# Patient Record
Sex: FEMALE | Race: BLACK OR AFRICAN AMERICAN | Employment: UNEMPLOYED | URBAN - METROPOLITAN AREA
[De-identification: names, ages, dates, MRNs, and addresses within clinical notes are randomized per-mention and may not be internally consistent; named-entity substitution may affect disease eponyms.]

---

## 2017-01-03 ENCOUNTER — TELEPHONE (OUTPATIENT)
Dept: SURGERY | Age: 37
End: 2017-01-03

## 2017-01-03 NOTE — TELEPHONE ENCOUNTER
I called the patient and i left her a voice mail to call me back in regards to her post op phone call.

## 2017-01-03 NOTE — TELEPHONE ENCOUNTER
----- Message from Nacho Robledo LPN sent at [id number]  8:18 AM EST -----  Regardin day post op call  Lap sleeve gastrectomy discharged on 16

## 2017-01-04 ENCOUNTER — OFFICE VISIT (OUTPATIENT)
Dept: SURGERY | Age: 37
End: 2017-01-04

## 2017-01-04 ENCOUNTER — HOSPITAL ENCOUNTER (OUTPATIENT)
Age: 37
Discharge: HOME | End: 2017-01-04
Attending: SURGERY | Admitting: SURGERY
Payer: MEDICAID

## 2017-01-04 VITALS
HEART RATE: 93 BPM | BODY MASS INDEX: 48.82 KG/M2 | DIASTOLIC BLOOD PRESSURE: 90 MMHG | WEIGHT: 293 LBS | SYSTOLIC BLOOD PRESSURE: 142 MMHG | HEIGHT: 65 IN | TEMPERATURE: 98.4 F | OXYGEN SATURATION: 96 % | RESPIRATION RATE: 20 BRPM

## 2017-01-04 VITALS
OXYGEN SATURATION: 95 % | HEART RATE: 76 BPM | RESPIRATION RATE: 17 BRPM | DIASTOLIC BLOOD PRESSURE: 94 MMHG | SYSTOLIC BLOOD PRESSURE: 177 MMHG | TEMPERATURE: 98.7 F

## 2017-01-04 DIAGNOSIS — E86.0 DEHYDRATION: ICD-10-CM

## 2017-01-04 DIAGNOSIS — Z09 SURGICAL FOLLOW-UP CARE: Primary | ICD-10-CM

## 2017-01-04 DIAGNOSIS — Z98.84 S/P LAPAROSCOPIC SLEEVE GASTRECTOMY: ICD-10-CM

## 2017-01-04 PROCEDURE — 96360 HYDRATION IV INFUSION INIT: CPT

## 2017-01-04 PROCEDURE — 74011250637 HC RX REV CODE- 250/637: Performed by: NURSE PRACTITIONER

## 2017-01-04 PROCEDURE — 96361 HYDRATE IV INFUSION ADD-ON: CPT

## 2017-01-04 PROCEDURE — 74011000250 HC RX REV CODE- 250: Performed by: NURSE PRACTITIONER

## 2017-01-04 PROCEDURE — C9113 INJ PANTOPRAZOLE SODIUM, VIA: HCPCS | Performed by: NURSE PRACTITIONER

## 2017-01-04 PROCEDURE — 99211 OFF/OP EST MAY X REQ PHY/QHP: CPT

## 2017-01-04 PROCEDURE — 96374 THER/PROPH/DIAG INJ IV PUSH: CPT

## 2017-01-04 PROCEDURE — 74011250636 HC RX REV CODE- 250/636: Performed by: NURSE PRACTITIONER

## 2017-01-04 PROCEDURE — 96375 TX/PRO/DX INJ NEW DRUG ADDON: CPT

## 2017-01-04 RX ORDER — METOCLOPRAMIDE HYDROCHLORIDE 5 MG/ML
10 INJECTION INTRAMUSCULAR; INTRAVENOUS EVERY 6 HOURS
Status: DISCONTINUED | OUTPATIENT
Start: 2017-01-04 | End: 2017-01-04 | Stop reason: HOSPADM

## 2017-01-04 RX ORDER — SODIUM CHLORIDE, SODIUM LACTATE, POTASSIUM CHLORIDE, CALCIUM CHLORIDE 600; 310; 30; 20 MG/100ML; MG/100ML; MG/100ML; MG/100ML
200 INJECTION, SOLUTION INTRAVENOUS CONTINUOUS
Status: DISCONTINUED | OUTPATIENT
Start: 2017-01-04 | End: 2017-01-04 | Stop reason: HOSPADM

## 2017-01-04 RX ORDER — HYOSCYAMINE SULFATE 0.12 MG/1
0.12 TABLET SUBLINGUAL
Status: DISCONTINUED | OUTPATIENT
Start: 2017-01-04 | End: 2017-01-04 | Stop reason: HOSPADM

## 2017-01-04 RX ADMIN — SODIUM CHLORIDE 40 MG: 9 INJECTION INTRAMUSCULAR; INTRAVENOUS; SUBCUTANEOUS at 15:04

## 2017-01-04 RX ADMIN — METOCLOPRAMIDE 10 MG: 5 INJECTION, SOLUTION INTRAMUSCULAR; INTRAVENOUS at 15:04

## 2017-01-04 RX ADMIN — SODIUM CHLORIDE, SODIUM LACTATE, POTASSIUM CHLORIDE, AND CALCIUM CHLORIDE 200 ML/HR: 600; 310; 30; 20 INJECTION, SOLUTION INTRAVENOUS at 12:30

## 2017-01-04 RX ADMIN — HYOSCYAMINE SULFATE 0.12 MG: 0.12 TABLET ORAL at 15:04

## 2017-01-04 NOTE — PROGRESS NOTES
1225 Arrived to room 433 A in a wheelchair. Very anxious about getting another IV. Stated that she was unable to tolerate diet at home. Sleepy. Tearful.

## 2017-01-04 NOTE — PATIENT INSTRUCTIONS
Start taking multivitamin with iron and Vitamin B12. Take Prilosec daily     Continue Lovenox injection     Dizziness: Care Instructions  Your Care Instructions  Dizziness is the feeling of unsteadiness or fuzziness in your head. It is different than having vertigo, which is a feeling that the room is spinning or that you are moving or falling. It is also different from lightheadedness, which is the feeling that you are about to faint. It can be hard to know what causes dizziness. Some people feel dizzy when they have migraine headaches. Sometimes bouts of flu can make you feel dizzy. Some medical conditions, such as heart problems or high blood pressure, can make you feel dizzy. Many medicines can cause dizziness, including medicines for high blood pressure, pain, or anxiety. If a medicine causes your symptoms, your doctor may recommend that you stop or change the medicine. If it is a problem with your heart, you may need medicine to help your heart work better. If there is no clear reason for your symptoms, your doctor may suggest watching and waiting for a while to see if the dizziness goes away on its own. Follow-up care is a key part of your treatment and safety. Be sure to make and go to all appointments, and call your doctor if you are having problems. It's also a good idea to know your test results and keep a list of the medicines you take. How can you care for yourself at home? · If your doctor recommends or prescribes medicine, take it exactly as directed. Call your doctor if you think you are having a problem with your medicine. · Do not drive while you feel dizzy. · Try to prevent falls. Steps you can take include:  ¨ Using nonskid mats, adding grab bars near the tub, and using night-lights. ¨ Clearing your home so that walkways are free of anything you might trip on. ¨ Letting family and friends know that you have been feeling dizzy. This will help them know how to help you.   When should you call for help? Call 911 anytime you think you may need emergency care. For example, call if:  · You passed out (lost consciousness). · You have dizziness along with symptoms of a heart attack. These may include:  ¨ Chest pain or pressure, or a strange feeling in the chest.  ¨ Sweating. ¨ Shortness of breath. ¨ Nausea or vomiting. ¨ Pain, pressure, or a strange feeling in the back, neck, jaw, or upper belly or in one or both shoulders or arms. ¨ Lightheadedness or sudden weakness. ¨ A fast or irregular heartbeat. · You have symptoms of a stroke. These may include:  ¨ Sudden numbness, tingling, weakness, or loss of movement in your face, arm, or leg, especially on only one side of your body. ¨ Sudden vision changes. ¨ Sudden trouble speaking. ¨ Sudden confusion or trouble understanding simple statements. ¨ Sudden problems with walking or balance. ¨ A sudden, severe headache that is different from past headaches. Call your doctor now or seek immediate medical care if:  · You feel dizzy and have a fever, headache, or ringing in your ears. · You have new or increased nausea and vomiting. · Your dizziness does not go away or comes back. Watch closely for changes in your health, and be sure to contact your doctor if:  · You do not get better as expected. Where can you learn more? Go to http://juan diego-jean pierre.info/. Enter M099 in the search box to learn more about \"Dizziness: Care Instructions. \"  Current as of: May 27, 2016  Content Version: 11.1  © 5205-1383 Healthwise, Incorporated. Care instructions adapted under license by [employer] (which disclaims liability or warranty for this information). If you have questions about a medical condition or this instruction, always ask your healthcare professional. [address] any warranty or liability for your use of this information.

## 2017-01-04 NOTE — H&P
Jane Mendoza is a 31 y.o. female 1 week status post lap sleeve gastrectomy, down 9.5 pounds. Weight today is 370.5 pounds. Patient comes in office with complaint of dizziness and weakness since discharge from surgery. Patient stated that she has felt really bad. Also complaint of abdominal spasms and pain, mostly spasms. Admits to not taking any other medications that she was given pre-op to help with above symptoms. Stated has been taking Dilaudid for pain, yet cause drowsiness. Stated only been able to taking in a few sips of water due to having abdominal spasms. Stated tries to drink [employer], yet stated feels like its getting stuck. Denies nausea, no vomiting, no heartburn/reflux. Denies dysphagia. No fever/no chills, no shortness of breath, no chest pain. Stated walking some. No bowel movement since surgery. + flatus. No issues with urination. Stated only has had one dose of Lovenox injections.         HPI     Review of Systems   Constitutional: Positive for malaise/fatigue. Negative for chills and fever. Respiratory: Negative for cough, sputum production and shortness of breath. Cardiovascular: Negative for chest pain, palpitations and leg swelling. Gastrointestinal: Positive for abdominal pain, constipation and heartburn. Negative for blood in stool, diarrhea, nausea and vomiting. Genitourinary: Negative for dysuria. Neurological: Positive for dizziness and weakness.         Physical Exam   Constitutional: She is oriented to person, place, and time. She appears well-developed. No distress. Cardiovascular: Normal rate, regular rhythm and normal heart sounds. Pulmonary/Chest: Effort normal and breath sounds normal. No respiratory distress. She has no wheezes. She has no rales. Abdominal: Soft. Bowel sounds are normal. She exhibits no distension. There is tenderness. There is no rebound and no guarding. Lap sites dry and intact.  Complaint of tenderness with deep palpation to right abdominal incision. Musculoskeletal: Normal range of motion. Neurological: She is alert and oriented to person, place, and time. Skin: Skin is warm and dry. There is pallor. Psychiatric: She has a normal mood and affect. Her speech is normal. Thought content normal. She is slowed. Blood pressure 142/90, pulse 93, temperature 98.4 °F (36.9 °C), temperature source Oral, resp. rate 20, height 5' 5\" (1.651 m), weight (!) 370 lb 8 oz (168.1 kg), last menstrual period 12/08/2016, SpO2 96 %.          ASSESSMENT and PLAN  Morbid Obesity 1 week status post lap sleeve gastrectomy, down 9.5 pounds. Weight today is 370.5 pounds. Dizziness and dehydration     Patient sent for IV hydration with ADC. Also with given IV Protonix, IV Reglan, and sublingual Levsin. Patient to continue with full liquid diet with emphasis on protein supplementation. Patient to also start bariatric vitamin regiment and Omeprazole. Patient advised may use over the counter Milk of Magnesia for bowels. Resume Lovenox Patient will be evaluated after IV infusion for discharge. Will discuss with Dr. Milady Polanco. Follow-up in office next week. Patient verbalized understanding and questions were answered to the best of my knowledge and ability. Dizziness educational materials were provided. Advised if any questions or concerns to call the office.

## 2017-01-04 NOTE — PROGRESS NOTES
Judy Rudd is a 31 y.o. female 1 week status post lap sleeve gastrectomy, down 9.5 pounds. Weight today is 370.5 pounds. Patient comes in office with complaint of dizziness and weakness since discharge from surgery. Patient stated that she has felt really bad. Also complaint of abdominal spasms and pain, mostly spasms. Admits to not taking any other medications that she was given pre-op to help with above symptoms. Stated has been taking Dilaudid for pain, yet cause drowsiness. Stated only been able to taking in a few sips of water due to having abdominal spasms. Stated tries to drink [employer], yet stated feels like its getting stuck. Denies nausea, no vomiting, no heartburn/reflux. Denies dysphagia. No fever/no chills, no shortness of breath, no chest pain. Stated walking some. No bowel movement since surgery. + flatus. No issues with urination. Stated only has had one dose of Lovenox injections. HPI    Review of Systems   Constitutional: Positive for malaise/fatigue. Negative for chills and fever. Respiratory: Negative for cough, sputum production and shortness of breath. Cardiovascular: Negative for chest pain, palpitations and leg swelling. Gastrointestinal: Positive for abdominal pain, constipation and heartburn. Negative for blood in stool, diarrhea, nausea and vomiting. Genitourinary: Negative for dysuria. Neurological: Positive for dizziness and weakness. Physical Exam   Constitutional: She is oriented to person, place, and time. She appears well-developed. No distress. Cardiovascular: Normal rate, regular rhythm and normal heart sounds. Pulmonary/Chest: Effort normal and breath sounds normal. No respiratory distress. She has no wheezes. She has no rales. Abdominal: Soft. Bowel sounds are normal. She exhibits no distension. There is tenderness. There is no rebound and no guarding. Lap sites dry and intact.  Complaint of tenderness with deep palpation to right abdominal incision. Musculoskeletal: Normal range of motion. Neurological: She is alert and oriented to person, place, and time. Skin: Skin is warm and dry. There is pallor. Psychiatric: She has a normal mood and affect. Her speech is normal. Thought content normal. She is slowed. Blood pressure 142/90, pulse 93, temperature 98.4 °F (36.9 °C), temperature source Oral, resp. rate 20, height 5' 5\" (1.651 m), weight (!) 370 lb 8 oz (168.1 kg), last menstrual period 12/08/2016, SpO2 96 %. ASSESSMENT and PLAN  Morbid Obesity 1 week status post lap sleeve gastrectomy, down 9.5 pounds. Weight today is 370.5 pounds. Dizziness and dehydration    Patient sent for IV hydration with ADC. Also with given IV Protonix, IV Reglan, and sublingual Levsin. Patient to continue with full liquid diet with emphasis on protein supplementation. Patient to also start bariatric vitamin regiment and Omeprazole. Patient advised may use over the counter Milk of Magnesia for bowels. Resume Lovenox Patient will be evaluated after IV infusion for discharge. Will discuss with Dr. Angel Luis Raya. Follow-up in office next week. Patient verbalized understanding and questions were answered to the best of my knowledge and ability. Dizziness educational materials were provided. Advised if any questions or concerns to call the office.

## 2017-01-04 NOTE — PROGRESS NOTES
1. Have you been to the ER, urgent care clinic since your last visit? Hospitalized since your last visit? No    2. Have you seen or consulted any other health care providers outside of the [address] [place] [address] since your last visit? Include any pap smears or colon screening.  No

## 2017-01-04 NOTE — PROGRESS NOTES
Pt sitting comfortably in chair, no c/o except \"can I have something better to drink\"  She has taken 3 ounces of CIB and drinking tea out of 1 ounce cups. No n/v. She has been a bit overwhelmed since surgery and says she forgets to drink or falls asleep, thought she could only drink water or protein shakes at home but we reviewed other options, she has crystal light at home but \"I didn't think to drink it\",  Denies any other c/o.   Receiving IVF, VSS, NAD   She lives with her fiance and her 2 children ages 15 and 6 that have been helpful for her post op  Discussed at length strategies to stay hydrated at home  Questions addressed, pt reassured  OK for DC to home after IVF completed  F/u appointment in place with [facility] for 1/18, sooner if needed  No rx needed  LIUDMILA Mcgee

## 2017-01-04 NOTE — DISCHARGE INSTRUCTIONS
[facility] at [address] [place] [address]  Procedure:  Laparoscopic Sleeve Gastrectomy  You have a  a 2 week follow up appointment with Eloy Storey NP on January 18th        . Please remember that you are on ONLY LIQUIDS for the first 2 weeks after surgery until you are seen in our office. You should be drinking plenty of no calorie, non carbonated liquids through out the day and your meals are going to be a shake, the same type you did for your 2 week pre-op diet. Do not advance your diet until you are seen in our office. [address]  [phone]      Laparoscopic Sleeve Gastrectomy: What to Expect at [address] [place] [address]  A sleeve gastrectomy is a  surgery to make the stomach smaller. It is done to help people lose weight. The surgery limits the amount of food the stomach can hold. This helps you eat less and feel full sooner. The cuts (incisions) the doctor made in your belly will probably be sore for several days after the surgery. You probably will lose weight very quickly in the first few months after surgery. As time goes on, your weight loss will slow down. You can expect most of your weight loss to happen in the first 12 months after your surgery. You will have regular doctor's appointments during this time to check how you are doing. It is important to think of this surgery as a tool to help you lose weight. It is not an instant fix. You will still need to eat a healthy diet and get regular exercise. This will help you reach your weight goal and avoid regaining the weight you lose. It is common to have many different emotions after this surgery. You may feel happy or excited as you begin to lose weight. But you may also feel overwhelmed or frustrated by the changes that you have to make in your diet, activity, and lifestyle. Talk with your doctor if you have concerns or questions.   This care sheet gives you a general idea about how long it will take for you to recover. But each person recovers at a different pace. Follow the steps below to get better as quickly as possible. How can you care for yourself at home? Activity  · Rest when you feel tired. Getting enough sleep will help you recover. · Try to walk each day. Start by walking a little more than you did the day before. Bit by bit, increase the amount you walk. Walking boosts blood flow and helps prevent pneumonia and constipation. · Avoid strenuous activities, such as bicycle riding, jogging, weight lifting, or aerobic exercise, until your doctor says it is okay. · Until your doctor says it is okay, avoid lifting anything that would make you strain. This may include heavy grocery bags and milk containers, a heavy briefcase or backpack, cat litter or dog food bags, a vacuum , or a child. · Hold a pillow over your incisions when you cough or take deep breaths. This will support your belly and decrease your pain. · Do breathing exercises at home as instructed by your doctor. This will help prevent pneumonia. · You can drive when you are no longer taking narcotic pain medications and feel comfortable sitting in a car. · You will probably need to take 2 to 4 weeks off from work. It depends on the type of work you do and how you feel. You will probably return to normal activities within 3 to 5 weeks. · You may shower, if your doctor okays it. Pat the incisions dry. Do not take a bath for the first 2 weeks, or until your doctor tells you it is okay. Diet  · Your doctor will give you specific instructions about what to eat after the surgery. For the first 2 weeks, you will need to follow a liquid diet only. DO NOT ADVANCE TO SOFT FOOD UNTIL CLEARED BY YOUR DOCTOR. · Your doctor may recommend that you work with a dietitian to plan healthy meals that give you enough protein, vitamins, and minerals while you are losing weight.  Even with a healthy diet, you probably will need to take vitamin and mineral supplements for the rest of your life. · At first you may feel full after just a few sips of water or other liquid. It is important to try to sip water throughout the day to avoid becoming dehydrated. · You may notice that your bowel movements are not regular right after your surgery. This is common. Try to avoid constipation and straining with bowel movements. · Sometimes the stomach empties food into the small intestine too quickly. This is called dumping syndrome. It can cause diarrhea and make you feel faint, shaky, and nauseated. It also can make it hard for your body to get enough nutrition. ¨  Avoid high-sugar foods, or use products that have artificial sweeteners. ¨ Do not drink liquids within a half hour before eating and up to an hour after eating. ¨   ¨ Eat slowly. Try to chew each bite about 20 times. Allow 20 to 30 minutes for each meal.   Eat 3-4 4 ounce small meals or snacks a day. Medicines  · Take pain medicines exactly as directed. ¨ If the doctor gave you a prescription medicine for pain, take it as prescribed. ¨ If you are not taking a prescription pain medicine, ask your doctor if you can take an over-the-counter medicine. ¨ Do not take two or more pain medicines at the same time unless the doctor told you to. Many pain medicines have acetaminophen, which is Tylenol. Too much acetaminophen (Tylenol) can be harmful. · If you think your pain medicine is making you sick to your stomach:  ¨ Take your medicine after meals (unless your doctor has told you not to). Incision care  · Your incisions have a waterproof clue dressing on them that will peel off in 2 weeks. · Wash the area daily with warm, soapy water, and pat it dry. Other cleaning products, such as hydrogen peroxide, can make the wound heal more slowly. You may cover the area with a gauze bandage if it weeps or rubs against clothing.  Change the bandage every day. · Keep the area clean and dry. Follow-up care is a key part of your treatment and safety. Be sure to make and go to all appointments, and call your doctor if you are having problems. Itâs also a good idea to know your test results and keep a list of the medicines you take. When should you call for help? Call 911 anytime you think you may need emergency care. For example, call if:  · You pass out (lose consciousness). · You have severe trouble breathing. · You have sudden chest pain and shortness of breath, or you cough up blood. · You have severe belly pain. Call your doctor now or seek immediate medical care if:  · You are sick to your stomach or cannot keep fluids down. · You have pain that does not get better after you take pain medicine. · You have a fever over 101°F.  · You have loose stitches, or any of your incisions come open. · Bright red blood has soaked through the bandages over your incisions. · You have signs of infection, such as:  ¨ Increased pain, swelling, warmth, or redness. ¨ Red streaks leading from the incisions. ¨ Pus draining from the incision. ¨ Swollen lymph nodes in your neck, armpits, or groin. ¨ A fever. · You have signs of needing more fluids. You have sunken eyes and a dry mouth, and you pass only a little dark urine. Watch closely for changes in your health, and be sure to contact your doctor if you have any problems. Where can you learn more? Go to Tymphany.be. Enter Y354 in the search box to learn more about \"Laparoscopic Trent-en-Y Gastric Bypass: What to Expect at Home. \"   © 9082-4146 Healthwise, Incorporated. Care instructions adapted under license by [employer] (which disclaims liability or warranty for this information).  This care instruction is for use with your licensed healthcare professional. If you have questions about a medical condition or this instruction, always ask your healthcare professional. [facility] disclaims any warranty or liability for your use of this information.

## 2017-01-04 NOTE — MR AVS SNAPSHOT
Visit Information Date & Time Provider Department Dept. Phone Encounter #  
 1/4/2017 10:20 AM Vipin Campbell NP [place] [address] [id number] Your Appointments 1/18/2017 11:00 AM  
POST OP 10 MIN with Vipin Campbell NP  
[facility] [address] 0[address] ([address]) Appt Note: EP 2nd P/o 4 weeks sleeve $0CP $0PB  
 [address] [facility] [id number]  
[address] [phone]  
  
    
 2/1/2017 10:20 AM  
POST OP 10 MIN with Vipin Campbell NP  
[facility] [phone] ([address]) Appt Note: EP 3rd P/o 6 weeks sleeve $0CP $0PB  
 [address] [facility] [id number] [phone] Upcoming Health Maintenance Date Due DTaP/Tdap/Td series (1 - Tdap) 7/21/2003 PAP AKA CERVICAL CYTOLOGY 6/1/2014 INFLUENZA AGE 9 TO ADULT 8/1/2016 Allergies as of 1/4/2017  Review Complete On: 1/4/2017 By: Vipin Campbell NP No Known Allergies Current Immunizations  Reviewed on 11/6/2013 Name Date Pneumococcal Vaccine (Unspecified Type) 2/1/2011 Not reviewed this visit You Were Diagnosed With   
  
 Codes Comments Surgical follow-up care    -  Primary ICD-10-CM: U84 ICD-9-CM: V67.00 S/P laparoscopic sleeve gastrectomy     ICD-10-CM: S42.08 ICD-9-CM: V45.86 Dehydration     ICD-10-CM: E86.0 ICD-9-CM: 276.51   
 BMI 60.0-69.9, adult [facility])     ICD-10-CM: Z68.44 
ICD-9-CM: V85.44 Vitals BP Pulse Temp Resp Height(growth percentile) Weight(growth percentile) 142/90 (BP 1 Location: Left arm, BP Patient Position: Sitting) 93 98.4 °F (36.9 °C) (Oral) 20 5' 5\" (1.651 m) (!) 370 lb 8 oz (168.1 kg) LMP SpO2 BMI OB Status Smoking Status 12/08/2016 (Exact Date) 96% 61.65 kg/m2 Having regular periods Never Smoker BMI and BSA Data  Body Mass Index Body Surface Area  
 61.65 kg/m 2 2.78 m 2  
 Preferred Pharmacy Pharmacy Name Phone [address], [address]  [phone] Your Updated Medication List  
  
   
This list is accurate as of: 1/4/17 12:04 PM.  Always use your most recent med list.  
  
  
  
  
 albuterol 90 mcg/actuation inhaler Commonly known as:  PROVENTIL HFA, VENTOLIN HFA, PROAIR HFA Take 1 Puff by inhalation every four (4) hours as needed for Wheezing. enoxaparin 40 mg/0.4 mL Commonly known as:  LOVENOX  
0.4 mL by SubCUTAneous route daily. ergocalciferol 50,000 unit capsule Commonly known as:  ERGOCALCIFEROL Take 1 Cap by mouth every Monday and Friday. Indications: VITAMIN D DEFICIENCY (HIGH DOSE THERAPY)  
  
 fluticasone-salmeterol 500-50 mcg/dose diskus inhaler Commonly known as:  ADVAIR Take 1 Puff by inhalation every twelve (12) hours. HYDROmorphone 2 mg tablet Commonly known as:  DILAUDID Take 1-2 Tabs by mouth every four (4) hours as needed for Pain. Max Daily Amount: 24 mg.  
  
 hyoscyamine SL 0.125 mg SL tablet Commonly known as:  LEVSIN/SL  
1 Tab by SubLINGual route every four (4) hours as needed for Cramping.  
  
 lidocaine 5 % Commonly known as:  LIDODERM  
1 Patch by TransDERmal route as needed. Apply patch to the affected area for 12 hours a day and remove for 12 hours a day. lorcaserin 10 mg Tab Commonly known as:  Molly Kil Take 10 mg by mouth two (2) times a day. Max Daily Amount: 20 mg. Indications: WEIGHT LOSS MANAGEMENT FOR OBESE PATIENT (BMI >= 30) montelukast 10 mg tablet Commonly known as:  SINGULAIR Take 1 Tab by mouth daily. omeprazole 40 mg capsule Commonly known as:  PRILOSEC Take 1 Cap by mouth daily. ondansetron 4 mg disintegrating tablet Commonly known as:  ZOFRAN ODT Take 1 Tab by mouth every six (6) hours as needed for Nausea. SPIRIVA WITH HANDIHALER 18 mcg inhalation capsule Generic drug:  tiotropium Take 1 Cap by inhalation daily. Patient Instructions Start taking multivitamin with iron and Vitamin B12. Take Prilosec daily Continue Lovenox injection Dizziness: Care Instructions Your Care Instructions Dizziness is the feeling of unsteadiness or fuzziness in your head. It is different than having vertigo, which is a feeling that the room is spinning or that you are moving or falling. It is also different from lightheadedness, which is the feeling that you are about to faint. It can be hard to know what causes dizziness. Some people feel dizzy when they have migraine headaches. Sometimes bouts of flu can make you feel dizzy. Some medical conditions, such as heart problems or high blood pressure, can make you feel dizzy. Many medicines can cause dizziness, including medicines for high blood pressure, pain, or anxiety. If a medicine causes your symptoms, your doctor may recommend that you stop or change the medicine. If it is a problem with your heart, you may need medicine to help your heart work better. If there is no clear reason for your symptoms, your doctor may suggest watching and waiting for a while to see if the dizziness goes away on its own. Follow-up care is a key part of your treatment and safety. Be sure to make and go to all appointments, and call your doctor if you are having problems. It's also a good idea to know your test results and keep a list of the medicines you take. How can you care for yourself at home? · If your doctor recommends or prescribes medicine, take it exactly as directed. Call your doctor if you think you are having a problem with your medicine. · Do not drive while you feel dizzy. · Try to prevent falls. Steps you can take include: ¨ Using nonskid mats, adding grab bars near the tub, and using night-lights. ¨ Clearing your home so that walkways are free of anything you might trip on. ¨ Letting family and friends know that you have been feeling dizzy. This will help them know how to help you. When should you call for help? Call 911 anytime you think you may need emergency care. For example, call if: 
· You passed out (lost consciousness). · You have dizziness along with symptoms of a heart attack. These may include: ¨ Chest pain or pressure, or a strange feeling in the chest. 
¨ Sweating. ¨ Shortness of breath. ¨ Nausea or vomiting. ¨ Pain, pressure, or a strange feeling in the back, neck, jaw, or upper belly or in one or both shoulders or arms. ¨ Lightheadedness or sudden weakness. ¨ A fast or irregular heartbeat. · You have symptoms of a stroke. These may include: 
¨ Sudden numbness, tingling, weakness, or loss of movement in your face, arm, or leg, especially on only one side of your body. ¨ Sudden vision changes. ¨ Sudden trouble speaking. ¨ Sudden confusion or trouble understanding simple statements. ¨ Sudden problems with walking or balance. ¨ A sudden, severe headache that is different from past headaches. Call your doctor now or seek immediate medical care if: 
· You feel dizzy and have a fever, headache, or ringing in your ears. · You have new or increased nausea and vomiting. · Your dizziness does not go away or comes back. Watch closely for changes in your health, and be sure to contact your doctor if: 
· You do not get better as expected. Where can you learn more? Go to http://juan diego-jean pierre.info/. Enter T919 in the search box to learn more about \"Dizziness: Care Instructions. \" Current as of: May 27, 2016 Content Version: 11.1 © 3638-3817 [employer]. Care instructions adapted under license by [employer] (which disclaims liability or warranty for this information).  If you have questions about a medical condition or this instruction, always ask your healthcare professional. [place] [address] any warranty or liability for your use of this information. Introducing [facility]! Tigre Longo introduces Tapjoy patient portal. Now you can access parts of your medical record, email your doctor's office, and request medication refills online. 1. In your internet browser, go to [email or web address] 2. Click on the First Time User? Click Here link in the Sign In box. You will see the New Member Sign Up page. 3. Enter your Tapjoy Access Code exactly as it appears below. You will not need to use this code after youve completed the sign-up process. If you do not sign up before the expiration date, you must request a new code. · Tapjoy Access Code: [email or web address] Expires: 3/8/2017 11:10 AM 
 
4. Enter the last four digits of your Social Security Number (xxxx) and Date of Birth (mm/dd/yyyy) as indicated and click Submit. You will be taken to the next sign-up page. 5. Create a Tapjoy ID. This will be your Tapjoy login ID and cannot be changed, so think of one that is secure and easy to remember. 6. Create a Tapjoy password. You can change your password at any time. 7. Enter your Password Reset Question and Answer. This can be used at a later time if you forget your password. 8. Enter your e-mail address. You will receive e-mail notification when new information is available in [address]. 9. Click Sign Up. You can now view and download portions of your medical record. 10. Click the Download Summary menu link to download a portable copy of your medical information. If you have questions, please visit the Frequently Asked Questions section of the Tapjoy website. Remember, Tapjoy is NOT to be used for urgent needs. For medical emergencies, dial 911. Now available from your iPhone and Android! Please provide this summary of care documentation to your next provider. Your primary care clinician is listed as [address]. If you have any questions after today's visit, please call [phone].

## 2017-01-10 ENCOUNTER — OFFICE VISIT (OUTPATIENT)
Dept: SURGERY | Age: 37
End: 2017-01-10

## 2017-01-10 VITALS
BODY MASS INDEX: 48.82 KG/M2 | OXYGEN SATURATION: 98 % | TEMPERATURE: 98.3 F | HEIGHT: 65 IN | RESPIRATION RATE: 20 BRPM | HEART RATE: 108 BPM | WEIGHT: 293 LBS

## 2017-01-10 DIAGNOSIS — Z09 POSTOPERATIVE EXAMINATION: Primary | ICD-10-CM

## 2017-01-10 NOTE — PROGRESS NOTES
1. Have you been to the ER, urgent care clinic since your last visit? Hospitalized since your last visit? No    2. Have you seen or consulted any other health care providers outside of the [address] [place] [address] since your last visit? Include any pap smears or colon screening.  No

## 2017-01-11 NOTE — PROGRESS NOTES
Subjective:      Micheline Dawson is a 31 y.o. female presents for postop care 12 days following LSG. Appetite is fair. Tolerating a liquid diet without difficulty, although she gets nauseated by the smell/taste of protein shakes. Bowel movements are regular. The patient is voiding without difficulty. She notes spasm-like pain at right-sided 15 mm trocar site; no fever, chills, wound drainage, dizziness, or dark urine; taking in 1.2L fluids daily. Objective:     Visit Vitals    Pulse (!) 108    Temp 98.3 °F (36.8 °C)    Resp 20    Ht 5' 5\" (1.651 m)    Wt (!) 351 lb (159.2 kg)    SpO2 98%    BMI 58.41 kg/m2       General:  alert, cooperative, no distress, appears stated age, morbidly obese   Abdomen: soft, no hernias, appropriate incisional pain with palpation   Incision:   healing well, no drainage, no erythema, no hernia, no seroma, no swelling, no dehiscence, incision well approximated     Assessment:     Doing well postoperatively. No signs of recurrent dehydration. Excellent weight loss thus far. Plan:     1. Continue current medications. 2. Advance to pureed diet. 3. Return to full duty in 2 weeks. 4. Re-evaluation in 1 week with Ms. Minh Lopez.

## 2017-01-11 NOTE — PATIENT INSTRUCTIONS
Learning About How to Prepare for Weight-Loss Surgery  How can you prepare for weight-loss surgery? Having weight-loss surgery (also called bariatric surgery) is a big step. You can prepare for surgery by having a plan. Your plan may include your goals for losing weight and how to makes changes in your diet, activity, and lifestyle to help raise your chances of success. One way to prepare for surgery is to think about your goal or reason why you want to reach a healthy weight. Do you want to lower your blood pressure, cholesterol, or blood sugar? Do you want to be able to sleep better, play with your kids, or walk around the block? Having a reason can help you stay with your plan and meet your goals. Your weight-loss surgery team can help you meet your goals and get ready for surgery. Malorie Smith work with a team that's trained to help you lose weight and make healthy changes in your life. This team may include:  · A medical doctor or nurse to help manage your care and schedule tests before surgery. · A surgeon who specializes in weight-loss surgery. · A registered dietitian to help you plan meals and make changes in the way you eat. · An exercise specialist to help you be more active and get stronger. · A therapist or counselor to help you learn why you eat and teach you ways to deal with stress and your emotions. Your team will also be there to help you prepare for life after surgery. They will help you adjust to new ways of eating and changes to your body. How will weight-loss surgery affect your life? You have likely thought a lot about how surgery may affect your life--how you will eat, how your body will look, or how you will feel. Some people feel overwhelmed with these changes. But planning can help you prepare for the changes and meet your weight-loss goals. One important step in your plan is to learn about the ways surgery will affect your life. These may include:  · A slimmer you.  You probably will lose weight very quickly in the first few months after surgery. As time goes on, your weight loss will slow down. How much weight you lose depends on what type of surgery you had and how well your new eating and activity plans are working for you. · A new way of eating. Success in reaching and keeping a healthy weight depends on making lifelong changes in how you eat. After surgery, you raise your chances of success if you:  ¨ Eat just a few ounces of food at a time. ¨ Eat very slowly and chew your food to mush. ¨ Don't drink for 30 minutes before you eat, during your meal, and for 30 minutes after you eat. ¨ Are careful about drinking alcohol. ¨ Avoid foods that are high in fat or sugar. ¨ Take vitamin and mineral supplements. · A healthier you. Weight-loss surgery can have some real health benefits. Problems like diabetes, high blood pressure, and sleep apnea may go away--or at least become easier to manage. · A more active you. After surgery, being active on most days of the week will help you reach your weight goal and avoid gaining back the weight you lose. · A lot of extra skin. When you lose weight quickly, you may have a lot of extra skin. That's normal. You can have surgery to remove the extra skin if it bothers you. There are going to be some ups and downs while you get used to these changes. So another way to adjust is to identify who can help support you. Getting support from friends and family can help. And joining a support group for people who have had the surgery can be a big help too, because they know what you're going through. As you know, it's a big decision to have weight-loss surgery. But when you have a plan, you can focus on losing weight and living a healthier life. So what steps can you take to prepare for weight-loss surgery? Will you set some goals? Will you learn about how surgery can affect your life? How about asking family or friends for help? Write out your plan.  Then get ready. Where can you learn more? Go to http://juan diego-jean pierre.info/. Enter J344 in the search box to learn more about \"Learning About How to Prepare for Weight-Loss Surgery. \"  Current as of: February 16, 2016  Content Version: 11.1  © 9411-5210 [employer]. Care instructions adapted under license by [employer] (which disclaims liability or warranty for this information). If you have questions about a medical condition or this instruction, always ask your healthcare professional. [address] any warranty or liability for your use of this information.

## 2017-01-18 ENCOUNTER — TELEPHONE (OUTPATIENT)
Dept: SURGERY | Age: 37
End: 2017-01-18

## 2017-01-18 ENCOUNTER — OFFICE VISIT (OUTPATIENT)
Dept: SURGERY | Age: 37
End: 2017-01-18

## 2017-01-18 VITALS — WEIGHT: 293 LBS | BODY MASS INDEX: 48.82 KG/M2 | HEIGHT: 65 IN | RESPIRATION RATE: 20 BRPM

## 2017-01-18 DIAGNOSIS — Z98.84 S/P LAPAROSCOPIC SLEEVE GASTRECTOMY: ICD-10-CM

## 2017-01-18 DIAGNOSIS — Z09 SURGICAL FOLLOW-UP CARE: Primary | ICD-10-CM

## 2017-01-18 NOTE — PATIENT INSTRUCTIONS
Continue taking Prilosec (Omeprazole)  Finish Lovenox injection  May try: chicken noodle soup, cream soups, vegetable soups, cooked vegetables, canned tuna/canned chicken, eggs. May have diluted juices and flavored water     Constipation: Care Instructions  Your Care Instructions  Constipation means that you have a hard time passing stools (bowel movements). People pass stools from 3 times a day to once every 3 days. What is normal for you may be different. Constipation may occur with pain in the rectum and cramping. The pain may get worse when you try to pass stools. Sometimes there are small amounts of bright red blood on toilet paper or the surface of stools. This is because of enlarged veins near the rectum (hemorrhoids). A few changes in your diet and lifestyle may help you avoid ongoing constipation. Your doctor may also prescribe medicine to help loosen your stool. Some medicines can cause constipation. These include pain medicines and antidepressants. Tell your doctor about all the medicines you take. Your doctor may want to make a medicine change to ease your symptoms. Follow-up care is a key part of your treatment and safety. Be sure to make and go to all appointments, and call your doctor if you are having problems. It's also a good idea to know your test results and keep a list of the medicines you take. How can you care for yourself at home? · Drink plenty of fluids, enough so that your urine is light yellow or clear like water. If you have kidney, heart, or liver disease and have to limit fluids, talk with your doctor before you increase the amount of fluids you drink. · Include high-fiber foods in your diet each day. These include fruits, vegetables, beans, and whole grains. · Get at least 30 minutes of exercise on most days of the week. Walking is a good choice. You also may want to do other activities, such as running, swimming, cycling, or playing tennis or team sports.   · Take a fiber supplement, such as Citrucel or Metamucil, every day. Read and follow all instructions on the label. · Schedule time each day for a bowel movement. A daily routine may help. Take your time having your bowel movement. · Support your feet with a small step stool when you sit on the toilet. This helps flex your hips and places your pelvis in a squatting position. · Your doctor may recommend an over-the-counter laxative to relieve your constipation. Examples are Milk of Magnesia (over the counter)and MiraLax. Read and follow all instructions on the label. Do not use laxatives on a long-term basis. · May also use suppository or Fleets enema( over the counter)  When should you call for help? Call your doctor now or seek immediate medical care if:  · You have new or worse belly pain. · You have new or worse nausea or vomiting. · You have blood in your stools. Watch closely for changes in your health, and be sure to contact your doctor if:  · Your constipation is getting worse. · You do not get better as expected. Where can you learn more? Go to http://juan diego-jean pierre.info/. Enter 21  in the search box to learn more about \"Constipation: Care Instructions. \"  Current as of: May 27, 2016  Content Version: 11.1  © 8883-0755 [employer]. Care instructions adapted under license by [employer] (which disclaims liability or warranty for this information). If you have questions about a medical condition or this instruction, always ask your healthcare professional. [place] [address] any warranty or liability for your use of this information.

## 2017-01-18 NOTE — PROGRESS NOTES
1. Have you been to the ER, urgent care clinic since your last visit? Hospitalized since your last visit? No    2. Have you seen or consulted any other health care providers outside of the [address] [place] [address] since your last visit? Include any pap smears or colon screening.  No

## 2017-01-18 NOTE — PROGRESS NOTES
Mandeep Ga is a 31 y.o. female 3 week s/p lap sleeve gastrectomy down 37.5 pounds. Weight today is 342.5 pounds. Patient stated she is still struggling with intake, especially with protein supplementation with shakes. Struggling to do anything, hadn't follow any recommendations that were given at previous appointment. Stated she is doing a little better water drinking. Minimal nausea, no vomiting, no heartburn/reflux. Stated taking Omeprazole daily. Denies dysphagia. No fever/no chills, no shortness of breath, no chest pain, and no abdominal pain. Still with some incisional pain to right abdomen. Stated she is tolerating warm liquids/foods. Stated has been eating soups. Stated sipping water all day. Tolerating all vitamins and medications. No exercise. Still taking Lovenox injections. Stated has 3 injections left. No issues with urination. Stated she hasn't had bowel movement in 2 weeks. HPI    Review of Systems   Constitutional: Positive for malaise/fatigue. Negative for chills and fever. Respiratory: Negative for cough, sputum production and shortness of breath. Cardiovascular: Negative for chest pain, palpitations and leg swelling. Gastrointestinal: Positive for constipation and nausea. Negative for abdominal pain, blood in stool, diarrhea, heartburn and vomiting. Genitourinary: Negative for dysuria. Neurological: Negative for dizziness. Physical Exam   Constitutional: She is oriented to person, place, and time. She appears well-developed and well-nourished. Cardiovascular: Normal rate, regular rhythm and normal heart sounds. Pulmonary/Chest: Effort normal and breath sounds normal. No respiratory distress. She has no wheezes. She has no rales. Abdominal: Soft. Bowel sounds are normal. She exhibits no distension. There is tenderness. There is no rebound and no guarding. Lap sites dry and intact. Mild tenderness with deep palpation   Musculoskeletal: Normal range of motion.  She exhibits no edema. Neurological: She is alert and oriented to person, place, and time. Skin: Skin is warm and dry. No rash noted. No erythema. Psychiatric: She has a normal mood and affect. Her behavior is normal. Thought content normal.   Resp. rate 20, height 5' 5\" (1.651 m), weight 342 lb 8 oz (155.4 kg). ASSESSMENT and PLAN  Morbid Obesity 3 week s/p lap sleeve gastrectomy down 37.5 pounds. Weight today is 342.5 pounds. Discussed the need for patient to adhere to recommendations for recovery. Advised patient to stick to soft/mushy diet. Patient to continue solid/mushy foods diet in educational booklet. Avoid high fat foods and foods with added sugar. Continue to strive for 60 grams protein daily. Discussed use of protein powder in use in foods. This may include still using protein supplementation with shakes daily. May have protein shake as a meal or snack. Continue vitamin regiment with multivitamin twice daily, B 12 at least 500 mcg daily. Continue Omeprazole daily. Continue hydration with at least 40 ounces of non-calorie, non- carbonated beverages. Discussed the importance of exercise. Patient to complete Lovenox injections. For constipation, advised patient to use over the counter Milk of Magnesia, use as directed on packaging. Continue walking for physical activity at least 20 minutes a daily. For restrictions, no lifting, pushing, pulling more than 40 pounds. You may drive if not using narcotic pain medication, and you may get into bath tub or swimming pool at this time. Patient to follow up by phone next week. Next follow up appointment is in 2 weeks. Patient verbalized understanding and questions were answered to the best of my knowledge and ability. Nutrition and constipation educational materials were provided. Advised to call office if any questions or concerns.

## 2017-01-18 NOTE — MR AVS SNAPSHOT
Visit Information Date & Time Provider Department Dept. Phone Encounter #  
 1/18/2017 11:00 AM Donna Aguilar NP [facility] [phone] [id number] Follow-up Instructions Return in about 2 weeks (around 2/1/2017). Your Appointments 2/1/2017 10:20 AM  
POST OP 10 MIN with Donna Aguilar NP  
[facility] [address] [phone] ([facility]) Appt Note: EP 3rd P/o 6 weeks sleeve $0CP $0PB  
 [address] [facility] [address]  
[address] [place] [address] Upcoming Health Maintenance Date Due DTaP/Tdap/Td series (1 - Tdap) 7/21/2003 PAP AKA CERVICAL CYTOLOGY 6/1/2014 INFLUENZA AGE 9 TO ADULT 8/1/2016 Allergies as of 1/18/2017  Review Complete On: 1/18/2017 By: Donna Aguilar NP No Known Allergies Current Immunizations  Reviewed on 11/6/2013 Name Date Pneumococcal Vaccine (Unspecified Type) 2/1/2011 Not reviewed this visit You Were Diagnosed With   
  
 Codes Comments Surgical follow-up care    -  Primary ICD-10-CM: Z10 ICD-9-CM: V67.00 S/P laparoscopic sleeve gastrectomy     ICD-10-CM: L56.92 ICD-9-CM: V45.86 BMI 50.0-59.9, adult [facility])     ICD-10-CM: W91.96 
ICD-9-CM: V85.43 Vitals Resp Height(growth percentile) Weight(growth percentile) BMI OB Status Smoking Status 20 5' 5\" (1.651 m) 342 lb 8 oz (155.4 kg) 56.99 kg/m2 Having regular periods Never Smoker Vitals History BMI and BSA Data Body Mass Index Body Surface Area 56.99 kg/m 2 2.67 m 2 Preferred Pharmacy Pharmacy Name Phone [address], [address]  [phone] Your Updated Medication List  
  
   
This list is accurate as of: 1/18/17 12:01 PM.  Always use your most recent med list.  
  
  
  
  
 albuterol 90 mcg/actuation inhaler Commonly known as:  PROVENTIL HFA, VENTOLIN HFA, PROAIR HFA Take 1 Puff by inhalation every four (4) hours as needed for Wheezing. enoxaparin 40 mg/0.4 mL Commonly known as:  LOVENOX  
0.4 mL by SubCUTAneous route daily. ergocalciferol 50,000 unit capsule Commonly known as:  ERGOCALCIFEROL Take 1 Cap by mouth every Monday and Friday. Indications: VITAMIN D DEFICIENCY (HIGH DOSE THERAPY)  
  
 fluticasone-salmeterol 500-50 mcg/dose diskus inhaler Commonly known as:  ADVAIR Take 1 Puff by inhalation every twelve (12) hours. HYDROmorphone 2 mg tablet Commonly known as:  DILAUDID Take 1-2 Tabs by mouth every four (4) hours as needed for Pain. Max Daily Amount: 24 mg.  
  
 hyoscyamine SL 0.125 mg SL tablet Commonly known as:  LEVSIN/SL  
1 Tab by SubLINGual route every four (4) hours as needed for Cramping.  
  
 lidocaine 5 % Commonly known as:  LIDODERM  
1 Patch by TransDERmal route as needed. Apply patch to the affected area for 12 hours a day and remove for 12 hours a day. lorcaserin 10 mg Tab Commonly known as:  Ly Starring Take 10 mg by mouth two (2) times a day. Max Daily Amount: 20 mg. Indications: WEIGHT LOSS MANAGEMENT FOR OBESE PATIENT (BMI >= 30) montelukast 10 mg tablet Commonly known as:  SINGULAIR Take 1 Tab by mouth daily. omeprazole 40 mg capsule Commonly known as:  PRILOSEC Take 1 Cap by mouth daily. ondansetron 4 mg disintegrating tablet Commonly known as:  ZOFRAN ODT Take 1 Tab by mouth every six (6) hours as needed for Nausea. SPIRIVA WITH HANDIHALER 18 mcg inhalation capsule Generic drug:  tiotropium Take 1 Cap by inhalation daily. Follow-up Instructions Return in about 2 weeks (around 2/1/2017). Patient Instructions Continue taking Prilosec (Omeprazole) Finish Lovenox injection May try: chicken noodle soup, cream soups, vegetable soups, cooked vegetables, canned tuna/canned chicken, eggs. May have diluted juices and flavored water Constipation: Care Instructions Your Care Instructions Constipation means that you have a hard time passing stools (bowel movements). People pass stools from 3 times a day to once every 3 days. What is normal for you may be different. Constipation may occur with pain in the rectum and cramping. The pain may get worse when you try to pass stools. Sometimes there are small amounts of bright red blood on toilet paper or the surface of stools. This is because of enlarged veins near the rectum (hemorrhoids). A few changes in your diet and lifestyle may help you avoid ongoing constipation. Your doctor may also prescribe medicine to help loosen your stool. Some medicines can cause constipation. These include pain medicines and antidepressants. Tell your doctor about all the medicines you take. Your doctor may want to make a medicine change to ease your symptoms. Follow-up care is a key part of your treatment and safety. Be sure to make and go to all appointments, and call your doctor if you are having problems. It's also a good idea to know your test results and keep a list of the medicines you take. How can you care for yourself at home? · Drink plenty of fluids, enough so that your urine is light yellow or clear like water. If you have kidney, heart, or liver disease and have to limit fluids, talk with your doctor before you increase the amount of fluids you drink. · Include high-fiber foods in your diet each day. These include fruits, vegetables, beans, and whole grains. · Get at least 30 minutes of exercise on most days of the week. Walking is a good choice. You also may want to do other activities, such as running, swimming, cycling, or playing tennis or team sports. · Take a fiber supplement, such as Citrucel or Metamucil, every day. Read and follow all instructions on the label. · Schedule time each day for a bowel movement. A daily routine may help. Take your time having your bowel movement. · Support your feet with a small step stool when you sit on the toilet. This helps flex your hips and places your pelvis in a squatting position. · Your doctor may recommend an over-the-counter laxative to relieve your constipation. Examples are Milk of Magnesia (over the counter)and MiraLax. Read and follow all instructions on the label. Do not use laxatives on a long-term basis. · May also use suppository or Fleets enema( over the counter) When should you call for help? Call your doctor now or seek immediate medical care if: 
· You have new or worse belly pain. · You have new or worse nausea or vomiting. · You have blood in your stools. Watch closely for changes in your health, and be sure to contact your doctor if: 
· Your constipation is getting worse. · You do not get better as expected. Where can you learn more? Go to http://juan diego-jean pierre.info/. Enter 21 [phone] in the search box to learn more about \"Constipation: Care Instructions. \" Current as of: May 27, 2016 Content Version: 11.1 © 0077-5119 [employer]. Care instructions adapted under license by [employer] (which disclaims liability or warranty for this information). If you have questions about a medical condition or this instruction, always ask your healthcare professional. [place] [address] any warranty or liability for your use of this information. Introducing [facility] & HEALTH SERVICES! Clemencia Reyes introduces [employer] patient portal. Now you can access parts of your medical record, email your doctor's office, and request medication refills online. 1. In your internet browser, go to [email or web address] 2. Click on the First Time User? Click Here link in the Sign In box. You will see the New Member Sign Up page. 3. Enter your [employer] Access Code exactly as it appears below. You will not need to use this code after youve completed the sign-up process. If you do not sign up before the expiration date, you must request a new code. · [employer] Access Code: [email or web address] Expires: 3/8/2017 11:10 AM 
 
4. Enter the last four digits of your Social Security Number (xxxx) and Date of Birth (mm/dd/yyyy) as indicated and click Submit. You will be taken to the next sign-up page. 5. Create a [employer] ID. This will be your [employer] login ID and cannot be changed, so think of one that is secure and easy to remember. 6. Create a [employer] password. You can change your password at any time. 7. Enter your Password Reset Question and Answer. This can be used at a later time if you forget your password. 8. Enter your e-mail address. You will receive e-mail notification when new information is available in [address]. 9. Click Sign Up. You can now view and download portions of your medical record. 10. Click the Download Summary menu link to download a portable copy of your medical information. If you have questions, please visit the Frequently Asked Questions section of the [employer] website. Remember, [employer] is NOT to be used for urgent needs. For medical emergencies, dial 911. Now available from your iPhone and Android! Please provide this summary of care documentation to your next provider. Your primary care clinician is listed as [phone] If you have any questions after today's visit, please call [phone].

## 2017-02-01 ENCOUNTER — OFFICE VISIT (OUTPATIENT)
Dept: SURGERY | Age: 37
End: 2017-02-01

## 2017-02-01 VITALS
BODY MASS INDEX: 48.82 KG/M2 | HEART RATE: 81 BPM | WEIGHT: 293 LBS | TEMPERATURE: 98.5 F | OXYGEN SATURATION: 98 % | DIASTOLIC BLOOD PRESSURE: 96 MMHG | HEIGHT: 65 IN | SYSTOLIC BLOOD PRESSURE: 130 MMHG | RESPIRATION RATE: 20 BRPM

## 2017-02-01 DIAGNOSIS — Z98.84 S/P LAPAROSCOPIC SLEEVE GASTRECTOMY: ICD-10-CM

## 2017-02-01 DIAGNOSIS — E66.01 MORBID OBESITY DUE TO EXCESS CALORIES (HCC): Primary | ICD-10-CM

## 2017-02-01 RX ORDER — MULTIVITAMIN WITH IRON
1 TABLET ORAL DAILY
COMMUNITY
End: 2022-02-25

## 2017-02-01 RX ORDER — LANOLIN ALCOHOL/MO/W.PET/CERES
500 CREAM (GRAM) TOPICAL DAILY
COMMUNITY
End: 2021-10-19

## 2017-02-01 NOTE — PATIENT INSTRUCTIONS
What you need to know:  1 . Advance your diet to solid textured foods. 2. Take the recommended vitamins daily  3. You may return to your normal lifting   4. You can jog, walk, run, use and elliptical.   5 Follow up in 6 weeks. 6. You may go into a pool. 7. If you are not able to tolerate liquids, soft foods, moist meats or solid foods   Please call our office. 8. If you have vomiting and persistent epigastric pain or chest pain. You should call our office  [phone]) , the doctor on-call or go to the emergency room. What to do if you are constipated: You may  take Milk of Magnesia. Take 2 Tablespoons followed by 16 oz of water then 2 hours later take another 2 tablespoons. If  milk of magnesia does not work then take [place] or Miralax over the counter. Keep in mind that the Benefiber or Miralax may take a day or two to work. If all of the above do not work try a Fleets enema and follow the directions on the box. Below is a list of moist meats that you can now introduce into your diet. Moist Meats: Prepare food to the appropriate texture using low-fat cooking   methods       ? Tuna packed in water (strain before eating)  ? White flaky fish (leonel, cod, flounder, tilapia, salmon)   ? White chicken breast packed in water (strain before eating)  ? 96-99% fat free thinly sliced deli meat (ham, turkey, roast beef)  ? Fat free non-stick spray  ? Silken Tofu  ? Low-fat or vegetarian refried beans  ? Well-cooked beans and lentils  ? Skinless turkey or chicken (prepare to a soft texture)  ? 93% lean pureed beef (round or sirloin only)  ? Lean pork (cooked until very tender, cut into small pieces)  ? Eggs (preferable egg whites)  ? Egg substitutes  ? Herbs and spices  ?  Lite butter, margarine, canola oil, olive oil, reduced-fat or fat-free mclaughlin, reduced-fat or fat-free salad dressing, reduced-fat or fat-free cream cheese, reduced-fat or fat-free sour cream, lemon juice, salt, pepper, mustard, abner ray. See patient handbook for more low-fat cooking ideas. ? Be sure to use moist methods of cooking. Avoid microwaving meats because it can dry out the food making it harder to tolerate.

## 2017-02-01 NOTE — PROGRESS NOTES
1. Have you been to the ER, urgent care clinic since your last visit? Hospitalized since your last visit? No    2. Have you seen or consulted any other health care providers outside of the [facility] since your last visit? Include any pap smears or colon screening.  No

## 2017-02-01 NOTE — PROGRESS NOTES
Sekou Ac is a 31 y.o. female 6 weeks s/p lap sleeve gastrectomy down 27.5 pounds. Weight today is 352 pounds. Patient stated doing a lot better. Denies nausea, no vomiting, no heartburn/reflux. Denies dysphagia. No fever/no chills, no shortness of breath, no chest pain, and no abdominal pain. Tolerating soft foods much better, getting 40-50 grams of protein daily. Protein shake in use once daily. Tried soups, cooked vegetables, canned tuna. Drinking at least 40 ounces of water daily. Also drinking diluted juices. Tolerating all vitamins and medications. Exercising with walking. No issues with urination or bowel habits. HPI    Review of Systems   Constitutional: Negative for chills, fever and malaise/fatigue. Respiratory: Negative for cough, sputum production and shortness of breath. Cardiovascular: Negative for chest pain, palpitations and leg swelling. Gastrointestinal: Negative for abdominal pain, blood in stool, constipation, diarrhea, heartburn, nausea and vomiting. Genitourinary: Negative for dysuria. Neurological: Negative for dizziness. Physical Exam   Constitutional: She is oriented to person, place, and time. She appears well-developed and well-nourished. No distress. Cardiovascular: Normal rate, regular rhythm and normal heart sounds. Pulmonary/Chest: Effort normal and breath sounds normal. No respiratory distress. She has no wheezes. She has no rales. Abdominal: Soft. Bowel sounds are normal. She exhibits no distension. There is no tenderness. There is no rebound and no guarding. Lap sites dry and intact   Musculoskeletal: Normal range of motion. She exhibits no edema. Neurological: She is alert and oriented to person, place, and time. Skin: Skin is warm and dry. No rash noted. No erythema. Psychiatric: She has a normal mood and affect. Her behavior is normal. Thought content normal.   Blood pressure (!) 130/96, pulse 81, temperature 98.5 °F (36.9 °C), resp.  rate 20, height 5' 5\" (1.651 m), weight (!) 352 lb 8 oz (159.9 kg), SpO2 98 %. ASSESSMENT and PLAN  Morbid Obesity 6 weeks s/p lap sleeve gastrectomy down 27.5 pounds. Weight today is 352 pounds. Encouraged patient and commended patient for her consistency continue with behaviors. Patient may advance to \"regular diet\", that is outlined in educational booklet. Emphasis on 60 grams of protein daily. Pay attention to eating behaviors of no eating/drinking at the same time, chewing food wells, and portion control. Beware of foods that are too dry, may cause dysphagia. Continue hydration with at least 40 ounces of non-calorie, non-carbonated beverages daily. Continue vitamin regiment daily. For physical activity, patient is now free of restrictions and may incorporate more to exercise, yet increase as tolerated. Recommend attending support group. Follow up in 6 weeks. Patient verbalized understanding and questions were answered to the best of my knowledge and ability. Diet advancement educational materials were provided. Advised to call office with any questions or concerns.

## 2017-02-01 NOTE — MR AVS SNAPSHOT
Visit Information Date & Time Provider Department Dept. Phone Encounter #  
 2/1/2017 10:20 AM Elza Brower NP [place] [phone] [id number] Follow-up Instructions Return in about 6 weeks (around 3/15/2017). Upcoming Health Maintenance Date Due DTaP/Tdap/Td series (1 - Tdap) 7/21/2003 PAP AKA CERVICAL CYTOLOGY 6/1/2014 INFLUENZA AGE 9 TO ADULT 8/1/2016 Allergies as of 2/1/2017  Review Complete On: 2/1/2017 By: Elza Brower NP No Known Allergies Current Immunizations  Reviewed on 11/6/2013 Name Date Pneumococcal Vaccine (Unspecified Type) 2/1/2011 Not reviewed this visit You Were Diagnosed With   
  
 Codes Comments Morbid obesity due to excess calories ([facility] [address]    -  Primary ICD-10-CM: E66.01 
ICD-9-CM: 278.01 S/P laparoscopic sleeve gastrectomy     ICD-10-CM: L15.18 ICD-9-CM: V45.86 BMI 50.0-59.9, adult [facility])     ICD-10-CM: W72.63 
ICD-9-CM: V85.43 Vitals BP Pulse Temp Resp Height(growth percentile) Weight(growth percentile) (!) 130/96 (BP 1 Location: Right arm, BP Patient Position: Sitting) 81 98.5 °F (36.9 °C) 20 5' 5\" (1.651 m) (!) 352 lb 8 oz (159.9 kg) SpO2 BMI OB Status Smoking Status 98% 58.66 kg/m2 Having regular periods Never Smoker Vitals History BMI and BSA Data Body Mass Index Body Surface Area  
 58.66 kg/m 2 2.71 m 2 Preferred Pharmacy Pharmacy Name Phone [address] [phone] Your Updated Medication List  
  
   
This list is accurate as of: 2/1/17 11:25 AM.  Always use your most recent med list.  
  
  
  
  
 albuterol 90 mcg/actuation inhaler Commonly known as:  PROVENTIL HFA, VENTOLIN HFA, PROAIR HFA Take 1 Puff by inhalation every four (4) hours as needed for Wheezing. cyanocobalamin 500 mcg tablet Commonly known as:  VITAMIN B12 Take 500 mcg by mouth daily. enoxaparin 40 mg/0.4 mL Commonly known as:  LOVENOX  
0.4 mL by SubCUTAneous route daily. ergocalciferol 50,000 unit capsule Commonly known as:  ERGOCALCIFEROL Take 1 Cap by mouth every Monday and Friday. Indications: VITAMIN D DEFICIENCY (HIGH DOSE THERAPY)  
  
 fluticasone-salmeterol 500-50 mcg/dose diskus inhaler Commonly known as:  ADVAIR Take 1 Puff by inhalation every twelve (12) hours. hyoscyamine SL 0.125 mg SL tablet Commonly known as:  LEVSIN/SL  
1 Tab by SubLINGual route every four (4) hours as needed for Cramping.  
  
 lidocaine 5 % Commonly known as:  LIDODERM  
1 Patch by TransDERmal route as needed. Apply patch to the affected area for 12 hours a day and remove for 12 hours a day. montelukast 10 mg tablet Commonly known as:  SINGULAIR Take 1 Tab by mouth daily. multivitamin with iron tablet Take 1 Tab by mouth daily. omeprazole 40 mg capsule Commonly known as:  PRILOSEC Take 1 Cap by mouth daily. SPIRIVA WITH HANDIHALER 18 mcg inhalation capsule Generic drug:  tiotropium Take 1 Cap by inhalation daily. Follow-up Instructions Return in about 6 weeks (around 3/15/2017). Patient Instructions What you need to know: 
1 . Advance your diet to solid textured foods. 2. Take the recommended vitamins daily 3. You may return to your normal lifting 4. You can jog, walk, run, use and elliptical.  
5 Follow up in 6 weeks. 6. You may go into a pool. 7. If you are not able to tolerate liquids, soft foods, moist meats or solid foods   Please call our office. 8. If you have vomiting and persistent epigastric pain or chest pain. You should call our office  [phone]) , the doctor on-call or go to the emergency room. What to do if you are constipated: You may  take Milk of Magnesia.  Take 2 Tablespoons followed by 16 oz of water then 2 hours later take another 2 tablespoons. If  milk of magnesia does not work then take [place] [employer] or Miralax over the counter. Keep in mind that the Benefiber or Miralax may take a day or two to work. If all of the above do not work try a Fleets enema and follow the directions on the box. Below is a list of moist meats that you can now introduce into your diet. Moist Meats: Prepare food to the appropriate texture using low-fat cooking  
methods ? Tuna packed in water (strain before eating) ? White flaky fish (leonel, cod, flounder, tilapia, salmon) ? White chicken breast packed in water (strain before eating) ? 96-99% fat free thinly sliced deli meat (ham, turkey, roast beef) ? Fat free non-stick spray ? Silken Tofu ? Low-fat or vegetarian refried beans ? Well-cooked beans and lentils ? Skinless turkey or chicken (prepare to a soft texture) ? 93% lean pureed beef (round or sirloin only) ? Lean pork (cooked until very tender, cut into small pieces) ? Eggs (preferable egg whites) ? Egg substitutes ? Herbs and spices ? Lite butter, margarine, canola oil, olive oil, reduced-fat or fat-free mclaughlin, reduced-fat or fat-free salad dressing, reduced-fat or fat-free cream cheese, reduced-fat or fat-free sour cream, lemon juice, salt, pepper, mustard, ketchup, salsa. See patient handbook for more low-fat cooking ideas. ? Be sure to use moist methods of cooking. Avoid microwaving meats because it can dry out the food making it harder to tolerate. Introducing [facility] & HEALTH SERVICES! [employer] introduces [employer] patient portal. Now you can access parts of your medical record, email your doctor's office, and request medication refills online. 1. In your internet browser, go to [email or web address] 2. Click on the First Time User? Click Here link in the Sign In box. You will see the New Member Sign Up page. 3. Enter your GetBack Access Code exactly as it appears below. You will not need to use this code after youve completed the sign-up process. If you do not sign up before the expiration date, you must request a new code. · [employer] Access Code: [email or web address] Expires: 3/8/2017 11:10 AM 
 
4. Enter the last four digits of your Social Security Number (xxxx) and Date of Birth (mm/dd/yyyy) as indicated and click Submit. You will be taken to the next sign-up page. 5. Create a GetBack ID. This will be your [employer]Back login ID and cannot be changed, so think of one that is secure and easy to remember. 6. Create a GetBack password. You can change your password at any time. 7. Enter your Password Reset Question and Answer. This can be used at a later time if you forget your password. 8. Enter your e-mail address. You will receive e-mail notification when new information is available in [address] 9. Click Sign Up. You can now view and download portions of your medical record. 10. Click the Download Summary menu link to download a portable copy of your medical information. If you have questions, please visit the Frequently Asked Questions section of the [employer] website. Remember, [employer] is NOT to be used for urgent needs. For medical emergencies, dial 911. Now available from your iPhone and Android! Please provide this summary of care documentation to your next provider. Your primary care clinician is listed as [phone] If you have any questions after today's visit, please call [phone].

## 2017-03-01 ENCOUNTER — OFFICE VISIT (OUTPATIENT)
Dept: SURGERY | Age: 37
End: 2017-03-01

## 2017-03-01 VITALS
WEIGHT: 293 LBS | BODY MASS INDEX: 48.82 KG/M2 | HEART RATE: 76 BPM | TEMPERATURE: 98.4 F | RESPIRATION RATE: 20 BRPM | SYSTOLIC BLOOD PRESSURE: 126 MMHG | DIASTOLIC BLOOD PRESSURE: 86 MMHG | HEIGHT: 65 IN | OXYGEN SATURATION: 96 %

## 2017-03-01 DIAGNOSIS — E55.9 VITAMIN D DEFICIENCY: ICD-10-CM

## 2017-03-01 DIAGNOSIS — D50.9 IRON DEFICIENCY ANEMIA, UNSPECIFIED IRON DEFICIENCY ANEMIA TYPE: ICD-10-CM

## 2017-03-01 DIAGNOSIS — E53.8 VITAMIN B12 DEFICIENCY: ICD-10-CM

## 2017-03-01 DIAGNOSIS — Z09 SURGICAL FOLLOW-UP CARE: Primary | ICD-10-CM

## 2017-03-01 DIAGNOSIS — Z98.84 S/P LAPAROSCOPIC SLEEVE GASTRECTOMY: ICD-10-CM

## 2017-03-01 NOTE — PROGRESS NOTES
Marylene Shaker is a 31 y.o. female 2 months s/p lap sleeve gastrectomy down 37 pounds, lost 9.5 pounds. Weight today is 342.5 pounds. Patient stated doing well and a lot better with nutrition. Denies nausea, no vomiting, no heartburn/reflux. Denies dysphagia. No fever/no chills, no shortness of breath, no chest pain, and no abdominal pain. Tolerating all foods, getting 40-50 grams of protein daily. Protein shake in use every 2 days. Occasional snacking with fruit. Drinking at least 40 ounces of water daily. Tolerating all vitamins and medications. Exercising with walking daily, plans to start gym workout. Bowel movements once daily that are formed. HPI    Review of Systems   Constitutional: Negative for chills, fever and malaise/fatigue. Respiratory: Negative for cough, sputum production and shortness of breath. Cardiovascular: Negative for chest pain, palpitations and leg swelling. Gastrointestinal: Negative for abdominal pain, blood in stool, constipation, diarrhea, heartburn, nausea and vomiting. Genitourinary: Negative for dysuria. Neurological: Negative for dizziness. Physical Exam   Constitutional: She is oriented to person, place, and time. She appears well-developed and well-nourished. No distress. Cardiovascular: Normal rate, regular rhythm and normal heart sounds. Pulmonary/Chest: Effort normal and breath sounds normal. No respiratory distress. She has no wheezes. She has no rales. Abdominal: Soft. Bowel sounds are normal. She exhibits no distension. There is no tenderness. There is no rebound and no guarding. Lap sites healed. No hernia/masses palpated   Musculoskeletal: Normal range of motion. Neurological: She is alert and oriented to person, place, and time. Skin: Skin is warm and dry. No rash noted. No erythema. Psychiatric: She has a normal mood and affect.  Her behavior is normal. Thought content normal.   Blood pressure 126/86, pulse 76, temperature 98.4 °F (36.9 °C), resp. rate 20, height 5' 5\" (1.651 m), weight 342 lb 8 oz (155.4 kg), SpO2 96 %. ASSESSMENT and PLAN  Morbid Obesity  2 months s/p lap sleeve gastrectomy down 37 pounds, lost 9.5 pounds. Weight today is 342.5 pounds. Advised patient regard to diet that is high-protein, low-fat, low-sugar, limited carbohydrates. Strive for 60 grams of protein daily. If having a snack, foods that are protein or fiber rich. Still pay attention to behavioral factor and habits. No eating/drinking together, chew foods well, and portion control. Drink at least 40 ounces of non-carbonated, non-calorie beverages daily. Continue vitamin regiment daily. Exercise at least 3 days a week with cardiovascular and strength training. Provided patient with routine lab work slip. Advised anything concerning with labs, will contact patient prior to next visit. Patient to follow up in 3 months. Patient verbalized understanding and questions were answered to the best of my knowledge and ability. Exercise educational materials were provided. Advised to call office if any questions/concerns.

## 2017-03-01 NOTE — MR AVS SNAPSHOT
Visit Information Date & Time Provider Department Dept. Phone Encounter #  
 3/1/2017 11:40 AM Donna Aguilar NP [place] [address] [phone] [id number] Follow-up Instructions Return in about 3 months (around 6/1/2017). Upcoming Health Maintenance Date Due DTaP/Tdap/Td series (1 - Tdap) 7/21/2003 PAP AKA CERVICAL CYTOLOGY 6/1/2014 INFLUENZA AGE 9 TO ADULT 8/1/2016 Allergies as of 3/1/2017  Review Complete On: 3/1/2017 By: Donna Aguilar NP No Known Allergies Current Immunizations  Reviewed on 11/6/2013 Name Date Pneumococcal Vaccine (Unspecified Type) 2/1/2011 Not reviewed this visit You Were Diagnosed With   
  
 Codes Comments Surgical follow-up care    -  Primary ICD-10-CM: Q77 ICD-9-CM: V67.00 S/P laparoscopic sleeve gastrectomy     ICD-10-CM: O57.03 ICD-9-CM: V45.86 Vitamin B12 deficiency     ICD-10-CM: E53.8 ICD-9-CM: 266.2 Vitamin D deficiency     ICD-10-CM: E55.9 ICD-9-CM: 268.9 Iron deficiency anemia, unspecified iron deficiency anemia type     ICD-10-CM: D50.9 ICD-9-CM: 280.9 BMI 50.0-59.9, adult [facility])     ICD-10-CM: G56.75 
ICD-9-CM: V85.43 Vitals BP  
  
  
  
  
  
 126/86 (BP 1 Location: Left arm, BP Patient Position: Sitting) Vitals History BMI and BSA Data Body Mass Index Body Surface Area 56.99 kg/m 2 2.67 m 2 Preferred Pharmacy Pharmacy Name Phone [phone], [phone] [phone] Your Updated Medication List  
  
   
This list is accurate as of: 3/1/17 12:13 PM.  Always use your most recent med list.  
  
  
  
  
 albuterol 90 mcg/actuation inhaler Commonly known as:  PROVENTIL HFA, VENTOLIN HFA, PROAIR HFA Take 1 Puff by inhalation every four (4) hours as needed for Wheezing. cyanocobalamin 500 mcg tablet Commonly known as:  VITAMIN B12 Take 500 mcg by mouth daily. ergocalciferol 50,000 unit capsule Commonly known as:  ERGOCALCIFEROL Take 1 Cap by mouth every Monday and Friday. Indications: VITAMIN D DEFICIENCY (HIGH DOSE THERAPY)  
  
 fluticasone-salmeterol 500-50 mcg/dose diskus inhaler Commonly known as:  ADVAIR Take 1 Puff by inhalation every twelve (12) hours. hyoscyamine SL 0.125 mg SL tablet Commonly known as:  LEVSIN/SL  
1 Tab by SubLINGual route every four (4) hours as needed for Cramping.  
  
 lidocaine 5 % Commonly known as:  LIDODERM  
1 Patch by TransDERmal route as needed. Apply patch to the affected area for 12 hours a day and remove for 12 hours a day. montelukast 10 mg tablet Commonly known as:  SINGULAIR Take 1 Tab by mouth daily. multivitamin with iron tablet Take 1 Tab by mouth daily. omeprazole 40 mg capsule Commonly known as:  PRILOSEC Take 1 Cap by mouth daily. SPIRIVA WITH HANDIHALER 18 mcg inhalation capsule Generic drug:  tiotropium Take 1 Cap by inhalation daily. We Performed the Following CBC W/O DIFF [10069 CPT(R)] IRON PROFILE [id number] CPT(R)] METABOLIC PANEL, COMPREHENSIVE [12247 CPT(R)] VITAMIN B12 & FOLATE [57665 CPT(R)] VITAMIN D, 25 HYDROXY [id number] CPT(R)] Follow-up Instructions Return in about 3 months (around 6/1/2017). Patient Instructions Learning About Physical Activity What is physical activity? Physical activity is any kind of activity that gets your body moving. The types of physical activity that can help you get fit and stay healthy include: · Aerobic or \"cardio\" activities that make your heart beat faster and make you breathe harder, such as brisk walking, riding a bike, or running. Aerobic activities strengthen your heart and lungs and build up your endurance.  
· Strength training activities that make your muscles work against, or \"resist,\" something, such as lifting weights or doing push-ups. These activities help tone and strengthen your muscles. · Stretches that allow you to move your joints and muscles through their full range of motion. Stretching helps you be more flexible and avoid injury. What are the benefits of physical activity? Being active is one of the best things you can do to get fit and stay healthy. It helps you to: · Feel stronger and have more energy to do all the things you like to do. · Focus better at school or work and perform better in sports. · Feel, think, and sleep better. · Reach and stay at a healthy weight. · Lose fat and build lean muscle. · Lower your risk for serious health problems. · Keep your bones, muscles, and joints strong. Being fit lets you do more physical activity. And it lets you work out harder without as much effort. How can you make physical activity part of your life? Get at least 30 minutes of exercise on most days of the week. Walking is a good choice. You also may want to do other activities, such as running, swimming, cycling, or playing tennis or team sports. Pick activities that you likeones that make your heart beat faster, your muscles stronger, and your muscles and joints more flexible. If you find more than one thing you like doing, do them all. You don't have to do the same thing every day. Get your heart pumping every day. Any activity that makes your heart beat faster and keeps it at that rate for a while counts. Here are some great ways to get your heart beating faster: · Go for a brisk walk, run, or bike ride. · Go for a hike or swim. · Go in-line skating. · Play a game of touch football, basketball, or soccer. · Ride a bike. · Play tennis or racquetball. · Climb stairs. Even some household chores can be aerobicjust do them at a faster pace.  Vacuuming, raking or mowing the lawn, sweeping the garage, and washing and waxing the car all can help get your heart rate up. Strengthen your muscles during the week. You don't have to lift heavy weights or grow big, bulky muscles to get stronger. Doing a few simple activities that make your muscles work against, or \"resist,\" something can help you get stronger. For example, you can: · Do push-ups or sit-ups, which use your own body weight as resistance. · Lift weights or dumbbells or use stretch bands at home or in a gym or community center. Stretch your muscles often. Stretching will help you as you become more active. It can help you stay flexible, loosen tight muscles, and avoid injury. It can also help improve your balance and posture and can be a great way to relax. Be sure to stretch the muscles you'll be using when you work out. It's best to warm your muscles slightly before you stretch them. Walk or do some other light aerobic activity for a few minutes, and then start stretching. When you stretch your muscles: · Do it slowly. Stretching is not about going fast or making sudden movements. · Don't push or bounce during a stretch. · Hold each stretch for at least 15 to 30 seconds, if you can. You should feel a stretch in the muscle, but not pain. · Breathe out as you do the stretch. Then breathe in as you hold the stretch. Don't hold your breath. If you're worried about how more activity might affect your health, have a checkup before you start. Follow any special advice your doctor gives you for getting a smart start. Where can you learn more? Go to http://juan diego-jean pierre.info/. Enter C459 in the search box to learn more about \"Learning About Physical Activity. \" Current as of: May 27, 2016 Content Version: 11.1 © 1653-8205 [employer]. Care instructions adapted under license by [employer] (which disclaims liability or warranty for this information).  If you have questions about a medical condition or this instruction, always ask your healthcare professional. [address]  any warranty or liability for your use of this information. Introducing [facility]! [facility] introduces DebtFolio patient portal. Now you can access parts of your medical record, email your doctor's office, and request medication refills online. 1. In your internet browser, go to [email or web address] 2. Click on the First Time User? Click Here link in the Sign In box. You will see the New Member Sign Up page. 3. Enter your DebtFolio Access Code exactly as it appears below. You will not need to use this code after youve completed the sign-up process. If you do not sign up before the expiration date, you must request a new code. · DebtFolio Access Code: [email or web address] Expires: 3/8/2017 11:10 AM 
 
4. Enter the last four digits of your Social Security Number (xxxx) and Date of Birth (mm/dd/yyyy) as indicated and click Submit. You will be taken to the next sign-up page. 5. Create a DebtFolio ID. This will be your DebtFolio login ID and cannot be changed, so think of one that is secure and easy to remember. 6. Create a DebtFolio password. You can change your password at any time. 7. Enter your Password Reset Question and Answer. This can be used at a later time if you forget your password. 8. Enter your e-mail address. You will receive e-mail notification when new information is available in [address]. 9. Click Sign Up. You can now view and download portions of your medical record. 10. Click the Download Summary menu link to download a portable copy of your medical information. If you have questions, please visit the Frequently Asked Questions section of the DebtFolio website. Remember, DebtFolio is NOT to be used for urgent needs. For medical emergencies, dial 911. Now available from your iPhone and Android! Please provide this summary of care documentation to your next provider. Your primary care clinician is listed as [phone] If you have any questions after today's visit, please call [phone].

## 2017-03-01 NOTE — PATIENT INSTRUCTIONS

## 2017-03-01 NOTE — PROGRESS NOTES
1. Have you been to the ER, urgent care clinic since your last visit? Hospitalized since your last visit? No    2. Have you seen or consulted any other health care providers outside of the [address] [place] [address] since your last visit? Include any pap smears or colon screening.  No

## 2017-03-03 LAB
25(OH)D3+25(OH)D2 SERPL-MCNC: 18.4 NG/ML (ref 30–100)
ALBUMIN SERPL-MCNC: 4.4 G/DL (ref 3.5–5.5)
ALBUMIN/GLOB SERPL: 1.3 {RATIO} (ref 1.1–2.5)
ALP SERPL-CCNC: 90 IU/L (ref 39–117)
ALT SERPL-CCNC: 14 IU/L (ref 0–32)
AST SERPL-CCNC: 13 IU/L (ref 0–40)
BILIRUB SERPL-MCNC: 0.3 MG/DL (ref 0–1.2)
BUN SERPL-MCNC: 8 MG/DL (ref 6–20)
BUN/CREAT SERPL: 12 (ref 8–20)
CALCIUM SERPL-MCNC: 9.6 MG/DL (ref 8.7–10.2)
CHLORIDE SERPL-SCNC: 103 MMOL/L (ref 96–106)
CO2 SERPL-SCNC: 21 MMOL/L (ref 18–29)
CREAT SERPL-MCNC: 0.69 MG/DL (ref 0.57–1)
ERYTHROCYTE [DISTWIDTH] IN BLOOD BY AUTOMATED COUNT: 16.1 % (ref 12.3–15.4)
FOLATE SERPL-MCNC: 8.7 NG/ML
GLOBULIN SER CALC-MCNC: 3.4 G/DL (ref 1.5–4.5)
GLUCOSE SERPL-MCNC: 75 MG/DL (ref 65–99)
HCT VFR BLD AUTO: 38.4 % (ref 34–46.6)
HGB BLD-MCNC: 11.6 G/DL (ref 11.1–15.9)
IRON SATN MFR SERPL: 22 % (ref 15–55)
IRON SERPL-MCNC: 81 UG/DL (ref 27–159)
MCH RBC QN AUTO: 26.7 PG (ref 26.6–33)
MCHC RBC AUTO-ENTMCNC: 30.2 G/DL (ref 31.5–35.7)
MCV RBC AUTO: 88 FL (ref 79–97)
PLATELET # BLD AUTO: 269 X10E3/UL (ref 150–379)
POTASSIUM SERPL-SCNC: 4.3 MMOL/L (ref 3.5–5.2)
PROT SERPL-MCNC: 7.8 G/DL (ref 6–8.5)
RBC # BLD AUTO: 4.35 X10E6/UL (ref 3.77–5.28)
SODIUM SERPL-SCNC: 144 MMOL/L (ref 134–144)
TIBC SERPL-MCNC: 363 UG/DL (ref 250–450)
UIBC SERPL-MCNC: 282 UG/DL (ref 131–425)
VIT B12 SERPL-MCNC: 914 PG/ML (ref 211–946)
WBC # BLD AUTO: 5.1 X10E3/UL (ref 3.4–10.8)

## 2017-03-06 ENCOUNTER — TELEPHONE (OUTPATIENT)
Dept: SURGERY | Age: 37
End: 2017-03-06

## 2017-03-06 ENCOUNTER — HOSPITAL ENCOUNTER (EMERGENCY)
Age: 37
Discharge: HOME | End: 2017-03-06
Attending: EMERGENCY MEDICINE
Payer: MEDICAID

## 2017-03-06 ENCOUNTER — APPOINTMENT (OUTPATIENT)
Dept: GENERAL RADIOLOGY | Age: 37
End: 2017-03-06
Attending: NURSE PRACTITIONER
Payer: MEDICAID

## 2017-03-06 VITALS
WEIGHT: 293 LBS | TEMPERATURE: 101 F | BODY MASS INDEX: 50.02 KG/M2 | RESPIRATION RATE: 18 BRPM | DIASTOLIC BLOOD PRESSURE: 78 MMHG | HEIGHT: 64 IN | SYSTOLIC BLOOD PRESSURE: 143 MMHG | HEART RATE: 97 BPM | OXYGEN SATURATION: 96 %

## 2017-03-06 DIAGNOSIS — J06.9 ACUTE URI: Primary | ICD-10-CM

## 2017-03-06 LAB
APPEARANCE UR: ABNORMAL
BACTERIA URNS QL MICRO: ABNORMAL /HPF
BILIRUB UR QL: NEGATIVE
COLOR UR: ABNORMAL
EPITH CASTS URNS QL MICRO: ABNORMAL /LPF
FLUAV AG NPH QL IA: NEGATIVE
FLUBV AG NOSE QL IA: NEGATIVE
GLUCOSE UR STRIP.AUTO-MCNC: NEGATIVE MG/DL
HCG UR QL: NEGATIVE
HGB UR QL STRIP: ABNORMAL
KETONES UR QL STRIP.AUTO: 15 MG/DL
LEUKOCYTE ESTERASE UR QL STRIP.AUTO: ABNORMAL
NITRITE UR QL STRIP.AUTO: NEGATIVE
PH UR STRIP: 6.5 [PH] (ref 5–8)
PROT UR STRIP-MCNC: NEGATIVE MG/DL
RBC #/AREA URNS HPF: ABNORMAL /HPF (ref 0–5)
S PYO AG THROAT QL: NEGATIVE
SP GR UR REFRACTOMETRY: 1.02 (ref 1–1.03)
UROBILINOGEN UR QL STRIP.AUTO: 2 EU/DL (ref 0.2–1)
WBC URNS QL MICRO: ABNORMAL /HPF (ref 0–4)

## 2017-03-06 PROCEDURE — 81025 URINE PREGNANCY TEST: CPT

## 2017-03-06 PROCEDURE — 87086 URINE CULTURE/COLONY COUNT: CPT | Performed by: NURSE PRACTITIONER

## 2017-03-06 PROCEDURE — 71020 XR CHEST PA LAT: CPT

## 2017-03-06 PROCEDURE — 87880 STREP A ASSAY W/OPTIC: CPT

## 2017-03-06 PROCEDURE — 74011250637 HC RX REV CODE- 250/637: Performed by: NURSE PRACTITIONER

## 2017-03-06 PROCEDURE — 87070 CULTURE OTHR SPECIMN AEROBIC: CPT | Performed by: NURSE PRACTITIONER

## 2017-03-06 PROCEDURE — 81001 URINALYSIS AUTO W/SCOPE: CPT | Performed by: NURSE PRACTITIONER

## 2017-03-06 PROCEDURE — 99284 EMERGENCY DEPT VISIT MOD MDM: CPT

## 2017-03-06 PROCEDURE — 87804 INFLUENZA ASSAY W/OPTIC: CPT | Performed by: EMERGENCY MEDICINE

## 2017-03-06 RX ORDER — BENZONATATE 200 MG/1
200 CAPSULE ORAL
Qty: 15 CAP | Refills: 0 | Status: SHIPPED | OUTPATIENT
Start: 2017-03-06 | End: 2017-03-13

## 2017-03-06 RX ORDER — ALBUTEROL SULFATE 90 UG/1
2 AEROSOL, METERED RESPIRATORY (INHALATION)
Qty: 1 INHALER | Refills: 0 | Status: SHIPPED | OUTPATIENT
Start: 2017-03-06 | End: 2017-03-06

## 2017-03-06 RX ORDER — BENZONATATE 100 MG/1
100 CAPSULE ORAL
Qty: 15 CAP | Refills: 0 | Status: SHIPPED | OUTPATIENT
Start: 2017-03-06 | End: 2017-03-06 | Stop reason: ALTCHOICE

## 2017-03-06 RX ORDER — ACETAMINOPHEN 325 MG/1
650 TABLET ORAL
Status: COMPLETED | OUTPATIENT
Start: 2017-03-06 | End: 2017-03-06

## 2017-03-06 RX ORDER — ALBUTEROL SULFATE 90 UG/1
2 AEROSOL, METERED RESPIRATORY (INHALATION)
Qty: 1 INHALER | Refills: 0 | Status: SHIPPED | OUTPATIENT
Start: 2017-03-06

## 2017-03-06 RX ADMIN — ACETAMINOPHEN 650 MG: 325 TABLET, FILM COATED ORAL at 15:52

## 2017-03-06 NOTE — ED PROVIDER NOTES
HPI Comments: 31 y.o. female with past medical history significant for asthma, COPD, morbid obesity, HTN, ankle fx, anxiety, and sleep apnea who presents with chief complaint of generalized body aches. Pt complains of generalized body aches, fever (101), HA, sore throat, wheezing, cough, congestion, and diarrhea (x3) which began last night. Pt mentions that she was recently exposed to her friend who was dx with pneumonia. Pt has h/o COPD and asthma; she has not been using an inhaler at home. Pt reports that she had a Gastric sleeve procedure performed ~3 months ago. She denies vomiting or dysuria. She denies abdominal pain. There are no other acute medical concerns at this time. PCP: Luli Aguero MD    Note written by Dmitriy Munoz, as dictated by Jeffrey Carballo NP 3:26 PM      The history is provided by the patient. No  was used. Past Medical History:   Diagnosis Date    Advance directive discussed with patient 04/07/2016    Arthritis     Asthma     [facility]     Chronic obstructive pulmonary disease ([address]     3/16/15 notes from [facility]    Headache(784.0)     Hypertension     NO MEDS    Knee pain, acute 11/2013    [facility]    Morbid obesity (HCC)     Nausea & vomiting     Other ill-defined conditions(799.89)     recent ankle fx 11/21/12(not wearing air cast)    Psychiatric disorder     ANXIETY    Sleep apnea        Past Surgical History:   Procedure Laterality Date    HX GASTRECTOMY  12/29/2016    lap sleeve gastrectomy by Dr Francisca Godwin  2008    c section    HX GYN  t-14    d and c post miscarriage    HX GYN  12/5/12    HYSTEROSCOPIC REMOVAL OF IUD    HX KNEE ARTHROSCOPY      L KNEE    HX ORTHOPAEDIC      right hand    HX ORTHOPAEDIC      right tendon repair at age 11-16         Family History:   Problem Relation Age of Onset    Diabetes Mother     Hypertension Mother    Marcial Pedro Luis Elevated Lipids Mother     Asthma Sister    Marcial Pedro Luis Lung Disease Maternal Grandmother     Anesth Problems Neg Hx        Social History     Social History    Marital status: SINGLE     Spouse name: N/A    Number of children: N/A    Years of education: N/A     Occupational History    Not on file. Social History Main Topics    Smoking status: Never Smoker    Smokeless tobacco: Never Used    Alcohol use No    Drug use: No    Sexual activity: Not on file     Other Topics Concern    Not on file     Social History Narrative    ** Merged History Encounter **              ALLERGIES: Review of patient's allergies indicates no known allergies. Review of Systems   Constitutional: Positive for fever. HENT: Positive for congestion and sore throat. Respiratory: Positive for cough and wheezing. Gastrointestinal: Positive for diarrhea. Negative for vomiting. Genitourinary: Negative for dysuria. Musculoskeletal: Positive for myalgias (generalized aches). All other systems reviewed and are negative. Vitals:    03/06/17 1443   BP: 160/75   Pulse: (!) 103   Resp: 20   Temp: 100.4 °F (38 °C)   SpO2: 99%   Weight: (!) 158.8 kg (350 lb 1 oz)   Height: 5' 4\" (1.626 m)            Physical Exam   Constitutional: She is oriented to person, place, and time. She appears well-developed and well-nourished. No distress. Obese, non toxic     HENT:   Head: Normocephalic and atraumatic. Right Ear: Tympanic membrane, external ear and ear canal normal.   Left Ear: Tympanic membrane, external ear and ear canal normal.   Nose: Nose normal.   Mouth/Throat: Oropharynx is clear and moist. No oral lesions. No trismus in the jaw. Normal dentition. No uvula swelling. No oropharyngeal exudate or posterior oropharyngeal edema. Eyes: Conjunctivae are normal. Right eye exhibits no discharge. Left eye exhibits no discharge. Neck: Normal range of motion. Neck supple.    FROM without difficulty  Supple  No meningismus     Cardiovascular: Normal rate, regular rhythm and normal heart sounds. Exam reveals no gallop and no friction rub. No murmur heard. Pulmonary/Chest: Effort normal and breath sounds normal. No respiratory distress. She has no wheezes. She has no rales. Abdominal: Soft. She exhibits no distension. There is no tenderness. There is no rebound and no guarding. abd soft and completely non tender   Musculoskeletal: Normal range of motion. Neurological: She is alert and oriented to person, place, and time. Skin: Skin is warm and dry. She is not diaphoretic. Psychiatric: She has a normal mood and affect. Her behavior is normal. Judgment and thought content normal.   Nursing note and vitals reviewed. MDM  Number of Diagnoses or Management Options  Acute URI:      Amount and/or Complexity of Data Reviewed  Clinical lab tests: reviewed and ordered  Tests in the radiology section of CPT®: ordered and reviewed  Discuss the patient with other providers: yes (Dr. Jesus Knight )      ED Course     30 y/o female with congestion, cough, myalgia. Lerry Juliette Her neck is supple, no meningismus. Lungs cta-b. Urine with epithelial cells. She denies urinary sx. Will cx and defer treatment at this time given lack of urinary sx. Pt resting comfortably in NAD on initial and reevaluation. she is nontoxic. cxr no acute process. Suspect viral illness. Supportive care measures discussed. Close f/u with PCP discussed. Pt d/c'ed home in stable condition. XR Results (most recent):    Results from Hospital Encounter encounter on 03/06/17   XR CHEST PA LAT   Narrative Exam:  2 view chest    Indication: Cough, fever    Comparison to 12/29/2016. PA and lateral views demonstrate normal heart size. There is no acute process in  the lung fields. The osseous structures are unremarkable. Impression Impression: No acute process.             Procedures

## 2017-03-06 NOTE — ED NOTES
PA aware of temp 101 and ok with discharge. Discharge instructions given to patient by MD and nurse. Pt has been given counseling on medication use and verbalizes understanding. Pt ambulated off of unit in no signs of distress.

## 2017-03-06 NOTE — ED TRIAGE NOTES
Pt stated she has a fever of 101 since last night, +body aches, +headache, denies n/v, denies urinary symptoms, +diarrhea, +sore throat, +cough- yellow sputum

## 2017-03-06 NOTE — DISCHARGE INSTRUCTIONS

## 2017-03-08 LAB
BACTERIA SPEC CULT: NORMAL
BACTERIA SPEC CULT: NORMAL
CC UR VC: NORMAL
SERVICE CMNT-IMP: NORMAL
SERVICE CMNT-IMP: NORMAL

## 2017-03-14 DIAGNOSIS — E55.9 VITAMIN D DEFICIENCY: Primary | ICD-10-CM

## 2017-03-14 RX ORDER — ERGOCALCIFEROL 1.25 MG/1
50000 CAPSULE ORAL
Qty: 12 CAP | Refills: 2 | Status: SHIPPED | OUTPATIENT
Start: 2017-03-14 | End: 2018-04-06 | Stop reason: SDUPTHER

## 2017-04-26 ENCOUNTER — OFFICE VISIT (OUTPATIENT)
Dept: SURGERY | Age: 37
End: 2017-04-26

## 2017-04-26 VITALS
DIASTOLIC BLOOD PRESSURE: 70 MMHG | TEMPERATURE: 97.9 F | OXYGEN SATURATION: 98 % | BODY MASS INDEX: 50.02 KG/M2 | HEIGHT: 64 IN | SYSTOLIC BLOOD PRESSURE: 120 MMHG | WEIGHT: 293 LBS | HEART RATE: 66 BPM | RESPIRATION RATE: 20 BRPM

## 2017-04-26 DIAGNOSIS — Z98.84 S/P LAPAROSCOPIC SLEEVE GASTRECTOMY: ICD-10-CM

## 2017-04-26 DIAGNOSIS — E66.01 MORBID OBESITY DUE TO EXCESS CALORIES (HCC): Primary | ICD-10-CM

## 2017-04-26 NOTE — PATIENT INSTRUCTIONS
Learning About Getting More Protein  How does your body use protein? Protein is an essential part of our diets. It is made up of chemicals called amino acids. Your body needs protein to help build and repair muscle, skin, and other body tissues. Protein also helps fight infection, balance body fluids, and carry oxygen through the body. How much protein do you need? How much protein you need each day depends on your age, sex, and how active you are. It's recommended that most adults eat 5 to 7 ounces of protein foods a day. Sometimes you may need to eat more protein. Your doctor may advise you on the right amount of protein you need. What foods contain protein? Protein is found in a variety of foods. High-protein foods include lean meat, poultry, and fish. A serving of these foods is 2 to 3 ounces. (3 ounces is about the size and thickness of a deck of cards or the palm of your hand.)  Protein isn't just found in meat. If you are looking for other types of protein, the following foods are equal to about 1 ounce of meat:  · ¼ cup of cooked beans, peas, or lentils  · ¼ cup of tofu (about 2 ounces)  · 2 Tbsp of hummus  · ½ ounce of nuts or seeds (for example, 12 almonds or 7 walnut halves)  · 1 egg  · 1 Tbsp of peanut butter or other nut or seed butter  Other sources of protein include cheese, milk, and other milk products. You can also buy protein bars, drinks, and powders. Check the nutrition label for the amount of protein in each serving. What are some tips for getting more protein? You can get more protein in your food by adding high-protein ingredients. For example, you can:  · Add powdered milk to other foods, such as pudding or soups. · Add powdered protein to fruit smoothies and cooked cereal.  · Add beans to soup and chili. · Add nuts, seeds, or wheat germ to yogurt. You can also:  · Spread peanut butter onto a banana. · Mix cottage cheese into noodle dishes or casseroles.   · Sprinkle hard-boiled eggs on a salad. · Grate cheese over vegetables and soups. Where can you learn more? Go to http://juan diego-jean pierre.info/. Ruth Kong in the search box to learn more about \"Learning About Getting More Protein. \"  Current as of: July 26, 2016  Content Version: 11.2  © 2364-3239 [employer]. Care instructions adapted under license by [employer] (which disclaims liability or warranty for this information). If you have questions about a medical condition or this instruction, always ask your healthcare professional. [place] [address] any warranty or liability for your use of this information.

## 2017-04-26 NOTE — PROGRESS NOTES
1. Have you been to the ER, urgent care clinic since your last visit? Hospitalized since your last visit? yes ED for fever ? virus    2. Have you seen or consulted any other health care providers outside of the [address] [place] [address] since your last visit? Include any pap smears or colon screening.  No

## 2017-04-26 NOTE — PROGRESS NOTES
Alexx Yu is a 31 y.o. female 4 months s/p lap sleeve gastrectomy down down 56.5 pounds. Weight today is 324 pounds. Patient stated doing well. Denies nausea, no vomiting, no heartburn/reflux. Denies dysphagia. No fever/no chills, no shortness of breath, no chest pain, and no abdominal pain. Tolerating all foods except bread, getting 50-60 grams of protein daily. Protein shake in use daily. Stated focusing on plant based proteins. Snacking with fruit. Drinking at least 48 ounces of water daily. Tolerating all vitamins and medications. Exercising with weight training and walking at least 3 times a week. Bowel movements once daily that are formed. HPI    Review of Systems   Constitutional: Negative for chills, fever and malaise/fatigue. Respiratory: Negative for cough, sputum production and shortness of breath. Cardiovascular: Negative for chest pain, palpitations and leg swelling. Gastrointestinal: Negative for abdominal pain, constipation, diarrhea, heartburn, nausea and vomiting. Genitourinary: Negative for dysuria. Neurological: Negative for dizziness. Physical Exam   Constitutional: She is oriented to person, place, and time. She appears well-developed and well-nourished. No distress. Cardiovascular: Normal rate, regular rhythm and normal heart sounds. Pulmonary/Chest: Effort normal and breath sounds normal. No respiratory distress. She has no wheezes. She has no rales. Abdominal: Soft. Bowel sounds are normal. She exhibits no distension. There is no tenderness. There is no rebound and no guarding. Lap sites healed. No hernia/masses palpated   Musculoskeletal: Normal range of motion. She exhibits no edema. Neurological: She is alert and oriented to person, place, and time. Skin: Skin is warm and dry. No rash noted. No erythema. Psychiatric: She has a normal mood and affect.  Her behavior is normal. Thought content normal.   Blood pressure 120/70, pulse 66, temperature 97.9 °F (36.6 °C), resp. rate 20, height 5' 4\" (1.626 m), weight 324 lb (147 kg), SpO2 98 %. ASSESSMENT and PLAN  Morbid Obesity 4 months s/p lap sleeve gastrectomy down down 56.5 pounds. Weight today is 324 pounds. Advised patient regard to diet that is high-protein, low-fat, low-sugar, limited carbohydrates. Strive for 60 grams of protein daily. If having a snack, foods that are protein or fiber rich. Still pay attention to behavioral factor and habits. No eating/drinking together, chew foods well, and portion control. Drink at least 40 ounces of non-carbonated, non-calorie beverages daily. Continue vitamin regiment daily. Exercise at least 3 days a week with cardiovascular and strength training. Will forego labs at this time. Patient to follow up in 4 months. Patient verbalized understanding and questions were answered to the best of my knowledge and ability. Protein intake. educational materials were provided. Advised to call office if any questions/concerns.

## 2017-04-26 NOTE — MR AVS SNAPSHOT
Visit Information Date & Time Provider Department Dept. Phone Encounter #  
 4/26/2017 10:40 AM Ayala Soni NP [place] [id number] [phone] [id number] Follow-up Instructions Return in about 4 months (around 8/26/2017). Follow-up and Disposition History Upcoming Health Maintenance Date Due DTaP/Tdap/Td series (1 - Tdap) 7/21/2003 PAP AKA CERVICAL CYTOLOGY 6/1/2014 INFLUENZA AGE 9 TO ADULT 8/1/2016 Allergies as of 4/26/2017  Review Complete On: 4/26/2017 By: Ayala Soni NP No Known Allergies Current Immunizations  Reviewed on 11/6/2013 Name Date Pneumococcal Vaccine (Unspecified Type) 2/1/2011 Not reviewed this visit You Were Diagnosed With   
  
 Codes Comments Morbid obesity due to excess calories ([facility] [address]    -  Primary ICD-10-CM: E66.01 
ICD-9-CM: 278.01 S/P laparoscopic sleeve gastrectomy     ICD-10-CM: N71.61 ICD-9-CM: V45.86 Vitals BP Pulse Temp Resp Height(growth percentile) Weight(growth percentile) 120/70 (BP 1 Location: Left arm, BP Patient Position: Sitting) 66 97.9 °F (36.6 °C) 20 5' 4\" (1.626 m) 324 lb (147 kg) SpO2 BMI OB Status Smoking Status 98% 55.61 kg/m2 Having regular periods Never Smoker Vitals History BMI and BSA Data Body Mass Index Body Surface Area  
 55.61 kg/m 2 2.58 m 2 Preferred Pharmacy Pharmacy Name Phone [address]  [phone] Your Updated Medication List  
  
   
This list is accurate as of: 4/26/17 11:06 AM.  Always use your most recent med list.  
  
  
  
  
 albuterol 90 mcg/actuation inhaler Commonly known as:  VENTOLIN HFA Take 2 Puffs by inhalation every four (4) hours as needed for Wheezing. cyanocobalamin 500 mcg tablet Commonly known as:  VITAMIN B12 Take 500 mcg by mouth daily. ergocalciferol 50,000 unit capsule Commonly known as:  ERGOCALCIFEROL Take 1 Cap by mouth every seven (7) days. Indications: VITAMIN D DEFICIENCY (HIGH DOSE THERAPY)  
  
 fluticasone-salmeterol 500-50 mcg/dose diskus inhaler Commonly known as:  ADVAIR Take 1 Puff by inhalation every twelve (12) hours. hyoscyamine SL 0.125 mg SL tablet Commonly known as:  LEVSIN/SL  
1 Tab by SubLINGual route every four (4) hours as needed for Cramping.  
  
 lidocaine 5 % Commonly known as:  LIDODERM  
1 Patch by TransDERmal route as needed. Apply patch to the affected area for 12 hours a day and remove for 12 hours a day. montelukast 10 mg tablet Commonly known as:  SINGULAIR Take 1 Tab by mouth daily. multivitamin with iron tablet Take 1 Tab by mouth daily. omeprazole 40 mg capsule Commonly known as:  PRILOSEC Take 1 Cap by mouth daily. SPIRIVA WITH HANDIHALER 18 mcg inhalation capsule Generic drug:  tiotropium Take 1 Cap by inhalation daily. Follow-up Instructions Return in about 4 months (around 8/26/2017). Patient Instructions Learning About Getting More Protein How does your body use protein? Protein is an essential part of our diets. It is made up of chemicals called amino acids. Your body needs protein to help build and repair muscle, skin, and other body tissues. Protein also helps fight infection, balance body fluids, and carry oxygen through the body. How much protein do you need? How much protein you need each day depends on your age, sex, and how active you are. It's recommended that most adults eat 5 to 7 ounces of protein foods a day. Sometimes you may need to eat more protein. Your doctor may advise you on the right amount of protein you need. What foods contain protein? Protein is found in a variety of foods. High-protein foods include lean meat, poultry, and fish. A serving of these foods is 2 to 3 ounces.  (3 ounces is about the size and thickness of a deck of cards or the palm of your hand.) Protein isn't just found in meat. If you are looking for other types of protein, the following foods are equal to about 1 ounce of meat: · ¼ cup of cooked beans, peas, or lentils · ¼ cup of tofu (about 2 ounces) · 2 Tbsp of hummus · ½ ounce of nuts or seeds (for example, 12 almonds or 7 walnut halves) · 1 egg · 1 Tbsp of peanut butter or other nut or seed butter Other sources of protein include cheese, milk, and other milk products. You can also buy protein bars, drinks, and powders. Check the nutrition label for the amount of protein in each serving. What are some tips for getting more protein? You can get more protein in your food by adding high-protein ingredients. For example, you can: · Add powdered milk to other foods, such as pudding or soups. · Add powdered protein to fruit smoothies and cooked cereal. 
· Add beans to soup and chili. · Add nuts, seeds, or wheat germ to yogurt. You can also: 
· Spread peanut butter onto a banana. · Mix cottage cheese into noodle dishes or casseroles. · Sprinkle hard-boiled eggs on a salad. · Grate cheese over vegetables and soups. Where can you learn more? Go to http://juan diego-jean pierre.info/. Lea Alexis in the search box to learn more about \"Learning About Getting More Protein. \" Current as of: July 26, 2016 Content Version: 11.2 © 3140-4996 Healthwise, Incorporated. Care instructions adapted under license by Ambassador (which disclaims liability or warranty for this information). If you have questions about a medical condition or this instruction, always ask your healthcare professional. [address] any warranty or liability for your use of this information. Introducing [facility] & HEALTH SERVICES!    
 Leatha Villalta introduces [employer] patient portal. Now you can access parts of your medical record, email your doctor's office, and request medication refills online. 1. In your internet browser, go to [email or web address] 2. Click on the First Time User? Click Here link in the Sign In box. You will see the New Member Sign Up page. 3. Enter your Econotherm Access Code exactly as it appears below. You will not need to use this code after youve completed the sign-up process. If you do not sign up before the expiration date, you must request a new code. · Econotherm Access Code: [email or web address] Expires: 7/25/2017 11:06 AM 
 
4. Enter the last four digits of your Social Security Number (xxxx) and Date of Birth (mm/dd/yyyy) as indicated and click Submit. You will be taken to the next sign-up page. 5. Create a Econotherm ID. This will be your Econotherm login ID and cannot be changed, so think of one that is secure and easy to remember. 6. Create a Econotherm password. You can change your password at any time. 7. Enter your Password Reset Question and Answer. This can be used at a later time if you forget your password. 8. Enter your e-mail address. You will receive e-mail notification when new information is available in [address] 9. Click Sign Up. You can now view and download portions of your medical record. 10. Click the Download Summary menu link to download a portable copy of your medical information. If you have questions, please visit the Frequently Asked Questions section of the Econotherm website. Remember, Econotherm is NOT to be used for urgent needs. For medical emergencies, dial 911. Now available from your iPhone and Android! Please provide this summary of care documentation to your next provider. Your primary care clinician is listed as [phone] If you have any questions after today's visit, please call [phone].

## 2017-05-08 ENCOUNTER — TELEPHONE (OUTPATIENT)
Dept: SURGERY | Age: 37
End: 2017-05-08

## 2017-07-26 ENCOUNTER — DOCUMENTATION ONLY (OUTPATIENT)
Dept: FAMILY MEDICINE CLINIC | Age: 37
End: 2017-07-26

## 2017-07-26 NOTE — PROGRESS NOTES
Note sent to patient that we rec'd info from insurance company that she is due for annual Pap-this is also due in our HM

## 2017-08-23 ENCOUNTER — OFFICE VISIT (OUTPATIENT)
Dept: SURGERY | Age: 37
End: 2017-08-23

## 2017-08-23 VITALS
TEMPERATURE: 98.4 F | RESPIRATION RATE: 20 BRPM | DIASTOLIC BLOOD PRESSURE: 76 MMHG | SYSTOLIC BLOOD PRESSURE: 120 MMHG | OXYGEN SATURATION: 98 % | HEART RATE: 66 BPM | WEIGHT: 291.5 LBS | BODY MASS INDEX: 49.76 KG/M2 | HEIGHT: 64 IN

## 2017-08-23 DIAGNOSIS — E66.01 MORBID OBESITY DUE TO EXCESS CALORIES (HCC): Primary | ICD-10-CM

## 2017-08-23 DIAGNOSIS — Z98.84 S/P LAPAROSCOPIC SLEEVE GASTRECTOMY: ICD-10-CM

## 2017-08-23 NOTE — PROGRESS NOTES
1. Have you been to the ER, urgent care clinic since your last visit? Hospitalized since your last visit? No    2. Have you seen or consulted any other health care providers outside of the [address] [place] [address] since your last visit? Include any pap smears or colon screening.  No

## 2017-08-23 NOTE — PATIENT INSTRUCTIONS
Learning About Getting More Protein  How does your body use protein? Protein is an essential part of our diets. It is made up of chemicals called amino acids. Your body needs protein to help build and repair muscle, skin, and other body tissues. Protein also helps fight infection, balance body fluids, and carry oxygen through the body. How much protein do you need? How much protein you need each day depends on your age, sex, and how active you are. It's recommended that most adults eat 5 to 7 ounces of protein foods a day. Sometimes you may need to eat more protein. Your doctor may advise you on the right amount of protein you need. What foods contain protein? Protein is found in a variety of foods. High-protein foods include lean meat, poultry, and fish. A serving of these foods is 2 to 3 ounces. (3 ounces is about the size and thickness of a deck of cards.)  Protein isn't just found in meat. If you are looking for other types of protein, the following foods are equal to about 1 ounce of meat:  · ¼ cup of cooked beans, peas, or lentils  · ¼ cup of tofu (about 2 ounces)  · 2 Tbsp of hummus  · ½ ounce of nuts or seeds (for example, 12 almonds or 7 walnut halves)  · 1 egg  · 1 Tbsp of peanut butter or other nut or seed butter  Other sources of protein include cheese, milk, and other milk products. You can also buy protein bars, drinks, and powders. Check the nutrition label for the amount of protein in each serving. What are some tips for getting more protein? You can get more protein in your food by adding high-protein ingredients. For example, you can:  · Add powdered milk to other foods, such as pudding or soups. · Add powdered protein to fruit smoothies and cooked cereal.  · Add beans to soup and chili. · Add nuts, seeds, or wheat germ to yogurt. You can also:  · Spread peanut butter onto a banana. · Mix cottage cheese into noodle dishes or casseroles.   · Sprinkle hard-boiled eggs on a salad.  · Grate cheese over vegetables and soups. Where can you learn more? Go to http://juan diego-jean pierre.info/. Chicho Dobson in the search box to learn more about \"Learning About Getting More Protein. \"  Current as of: May 4, 2017  Content Version: 11.3  © 0382-4490 SafetyCertified, [employer]. Care instructions adapted under license by SchoolFeed (which disclaims liability or warranty for this information). If you have questions about a medical condition or this instruction, always ask your healthcare professional. [address] any warranty or liability for your use of this information.

## 2017-08-23 NOTE — PROGRESS NOTES
Sawyer Sosa is a 30 y.o. female 8 months status post lap sleeve gastrectomy, down 89 pounds. Weight today is 291.5 pounds. Denies nausea, no vomiting, no heartburn/reflux. Denies dysphagia. No fever/no chills, no shortness of breath, no chest pain, and no abdominal pain. Tolerating all foods, getting 40-50 grams of  protein daily. Occasional use of protein supplementation. Snacking with nuts Drinking at least 50 ounces of water or flavored water daily. Tolerating all vitamins and medications. Exercising with walking and weight training at least 3 times a week. Bowel movements once michael 2 days that are formed. HPI    Review of Systems   Constitutional: Negative for chills, fever and malaise/fatigue. Respiratory: Negative for cough, sputum production and shortness of breath. Cardiovascular: Negative for chest pain, palpitations and leg swelling. Gastrointestinal: Negative for abdominal pain, blood in stool, constipation, diarrhea, heartburn, nausea and vomiting. Neurological: Negative for dizziness. Physical Exam   Constitutional: She is oriented to person, place, and time. She appears well-developed and well-nourished. No distress. Cardiovascular: Normal rate, regular rhythm and normal heart sounds. Pulmonary/Chest: Effort normal and breath sounds normal. No respiratory distress. She has no wheezes. She has no rales. Abdominal: Soft. Bowel sounds are normal. She exhibits no distension. There is no tenderness. There is no rebound and no guarding. Lap sites healed. No hernia/masses palpated   Musculoskeletal: Normal range of motion. Neurological: She is alert and oriented to person, place, and time. Skin: Skin is warm and dry. No rash noted. No erythema. Psychiatric: She has a normal mood and affect. Her behavior is normal. Thought content normal.   Blood pressure 120/76, pulse 66, temperature 98.4 °F (36.9 °C), resp.  rate 20, height 5' 4\" (1.626 m), weight 291 lb 8 oz (132.2 kg), SpO2 98 %. ASSESSMENT and PLAN  Morbid Obesity 8 months status post lap sleeve gastrectomy, down 89 pounds. Weight today is 291.5 pounds. Advised patient regard to diet that is high-protein, low-fat, low-sugar, limited carbohydrates. Strive for 60 grams of protein daily. Discussed with patient different option for protein intake. If having a snack, foods that are protein or fiber rich. Still pay attention to behavioral factor and habits. No eating/drinking together, chew foods well, and portion control. Drink at least 40 ounces of non-carbonated, non-calorie beverages daily. Continue vitamin regiment daily. Exercise at least 3 days a week with cardiovascular and strength training. Will check bariatric labs at next appointment. Patient to follow up in 4 months at one year anniversary. Patient verbalized understanding and questions were answered to the best of my knowledge and ability. Protein intake educational materials were provided. Advised to call office if any questions/concerns. 18 minutes was spent with patient, greater than 505 of time spent counseling.

## 2017-08-23 NOTE — MR AVS SNAPSHOT
Visit Information Date & Time Provider Department Dept. Phone Encounter #  
 8/23/2017 10:20 AM Pieter Kocher, NP [place] [phone] [id number] Follow-up Instructions Return in about 4 months (around 12/23/2017). Upcoming Health Maintenance Date Due DTaP/Tdap/Td series (1 - Tdap) 7/21/2003 PAP AKA CERVICAL CYTOLOGY 6/1/2014 INFLUENZA AGE 9 TO ADULT 8/1/2017 Allergies as of 8/23/2017  Review Complete On: 8/23/2017 By: Miranda Epstein LPN No Known Allergies Current Immunizations  Reviewed on 11/6/2013 Name Date Pneumococcal Vaccine (Unspecified Type) 2/1/2011 Not reviewed this visit You Were Diagnosed With   
  
 Codes Comments Morbid obesity due to excess calories ([facility] [address]    -  Primary ICD-10-CM: E66.01 
ICD-9-CM: 278.01 S/P laparoscopic sleeve gastrectomy     ICD-10-CM: U58.28 ICD-9-CM: V45.86 BMI 50.0-59.9, adult [facility])     ICD-10-CM: I60.74 
ICD-9-CM: V85.43 Vitals BP Pulse Temp Resp Height(growth percentile) Weight(growth percentile) 120/76 (BP 1 Location: Left arm, BP Patient Position: At rest) 66 98.4 °F (36.9 °C) 20 5' 4\" (1.626 m) 291 lb 8 oz (132.2 kg) SpO2 BMI OB Status Smoking Status 98% 50.04 kg/m2 Having regular periods Never Smoker Vitals History BMI and BSA Data Body Mass Index Body Surface Area 50.04 kg/m 2 2.44 m 2 Preferred Pharmacy Pharmacy Name Phone [address], [address] [phone] Your Updated Medication List  
  
   
This list is accurate as of: 8/23/17 11:20 AM.  Always use your most recent med list.  
  
  
  
  
 albuterol 90 mcg/actuation inhaler Commonly known as:  VENTOLIN HFA Take 2 Puffs by inhalation every four (4) hours as needed for Wheezing. BIOTIN PO Take  by mouth.  
  
 cyanocobalamin 500 mcg tablet Commonly known as:  VITAMIN B12 Take 500 mcg by mouth daily. ergocalciferol 50,000 unit capsule Commonly known as:  ERGOCALCIFEROL Take 1 Cap by mouth every seven (7) days. Indications: VITAMIN D DEFICIENCY (HIGH DOSE THERAPY)  
  
 fluticasone-salmeterol 500-50 mcg/dose diskus inhaler Commonly known as:  ADVAIR Take 1 Puff by inhalation every twelve (12) hours. hyoscyamine SL 0.125 mg SL tablet Commonly known as:  LEVSIN/SL  
1 Tab by SubLINGual route every four (4) hours as needed for Cramping.  
  
 lidocaine 5 % Commonly known as:  LIDODERM  
1 Patch by TransDERmal route as needed. Apply patch to the affected area for 12 hours a day and remove for 12 hours a day. montelukast 10 mg tablet Commonly known as:  SINGULAIR Take 1 Tab by mouth daily. multivitamin with iron tablet Take 1 Tab by mouth daily. omeprazole 40 mg capsule Commonly known as:  PRILOSEC Take 1 Cap by mouth daily. SPIRIVA WITH HANDIHALER 18 mcg inhalation capsule Generic drug:  tiotropium Take 1 Cap by inhalation daily. Follow-up Instructions Return in about 4 months (around 12/23/2017). Patient Instructions Learning About Getting More Protein How does your body use protein? Protein is an essential part of our diets. It is made up of chemicals called amino acids. Your body needs protein to help build and repair muscle, skin, and other body tissues. Protein also helps fight infection, balance body fluids, and carry oxygen through the body. How much protein do you need? How much protein you need each day depends on your age, sex, and how active you are. It's recommended that most adults eat 5 to 7 ounces of protein foods a day. Sometimes you may need to eat more protein. Your doctor may advise you on the right amount of protein you need. What foods contain protein? Protein is found in a variety of foods.  High-protein foods include lean meat, poultry, and fish. A serving of these foods is 2 to 3 ounces. (3 ounces is about the size and thickness of a deck of cards.) Protein isn't just found in meat. If you are looking for other types of protein, the following foods are equal to about 1 ounce of meat: · ¼ cup of cooked beans, peas, or lentils · ¼ cup of tofu (about 2 ounces) · 2 Tbsp of hummus · ½ ounce of nuts or seeds (for example, 12 almonds or 7 walnut halves) · 1 egg · 1 Tbsp of peanut butter or other nut or seed butter Other sources of protein include cheese, milk, and other milk products. You can also buy protein bars, drinks, and powders. Check the nutrition label for the amount of protein in each serving. What are some tips for getting more protein? You can get more protein in your food by adding high-protein ingredients. For example, you can: · Add powdered milk to other foods, such as pudding or soups. · Add powdered protein to fruit smoothies and cooked cereal. 
· Add beans to soup and chili. · Add nuts, seeds, or wheat germ to yogurt. You can also: 
· Spread peanut butter onto a banana. · Mix cottage cheese into noodle dishes or casseroles. · Sprinkle hard-boiled eggs on a salad. · Grate cheese over vegetables and soups. Where can you learn more? Go to http://juan diego-jean pierre.info/. Deja Pugh in the search box to learn more about \"Learning About Getting More Protein. \" Current as of: May 4, 2017 Content Version: 11.3 © 2753-8135 [employer]. Care instructions adapted under license by Tuneenergy (which disclaims liability or warranty for this information). If you have questions about a medical condition or this instruction, always ask your healthcare professional. [address] any warranty or liability for your use of this information. Introducing [facility] & HEALTH SERVICES!    
 Teresita Hamilton introduces [employer] patient portal. Now you can access parts of your medical record, email your doctor's office, and request medication refills online. 1. In your internet browser, go to [email or web address] 2. Click on the First Time User? Click Here link in the Sign In box. You will see the New Member Sign Up page. 3. Enter your Molecule Synth Access Code exactly as it appears below. You will not need to use this code after youve completed the sign-up process. If you do not sign up before the expiration date, you must request a new code. · Molecule Synth Access Code: [email or web address] Expires: 11/21/2017 11:20 AM 
 
4. Enter the last four digits of your Social Security Number (xxxx) and Date of Birth (mm/dd/yyyy) as indicated and click Submit. You will be taken to the next sign-up page. 5. Create a Molecule Synth ID. This will be your Molecule Synth login ID and cannot be changed, so think of one that is secure and easy to remember. 6. Create a Molecule Synth password. You can change your password at any time. 7. Enter your Password Reset Question and Answer. This can be used at a later time if you forget your password. 8. Enter your e-mail address. You will receive e-mail notification when new information is available in [address] 9. Click Sign Up. You can now view and download portions of your medical record. 10. Click the Download Summary menu link to download a portable copy of your medical information. If you have questions, please visit the Frequently Asked Questions section of the Molecule Synth website. Remember, Molecule Synth is NOT to be used for urgent needs. For medical emergencies, dial 911. Now available from your iPhone and Android! Please provide this summary of care documentation to your next provider. Your primary care clinician is listed as [id number] If you have any questions after today's visit, please call [phone].

## 2017-10-30 ENCOUNTER — TELEPHONE (OUTPATIENT)
Dept: SURGERY | Age: 37
End: 2017-10-30

## 2017-12-26 ENCOUNTER — APPOINTMENT (OUTPATIENT)
Dept: GENERAL RADIOLOGY | Age: 37
End: 2017-12-26
Attending: EMERGENCY MEDICINE
Payer: MEDICAID

## 2017-12-26 ENCOUNTER — APPOINTMENT (OUTPATIENT)
Dept: CT IMAGING | Age: 37
End: 2017-12-26
Attending: EMERGENCY MEDICINE
Payer: MEDICAID

## 2017-12-26 ENCOUNTER — HOSPITAL ENCOUNTER (OUTPATIENT)
Age: 37
Setting detail: OBSERVATION
Discharge: HOME | End: 2017-12-27
Attending: EMERGENCY MEDICINE | Admitting: INTERNAL MEDICINE
Payer: MEDICAID

## 2017-12-26 DIAGNOSIS — I63.9 CEREBROVASCULAR ACCIDENT (CVA), UNSPECIFIED MECHANISM (HCC): Primary | ICD-10-CM

## 2017-12-26 LAB
ANION GAP SERPL CALC-SCNC: 7 MMOL/L (ref 5–15)
BASOPHILS # BLD: 0 K/UL (ref 0–0.1)
BASOPHILS NFR BLD: 1 % (ref 0–1)
BUN SERPL-MCNC: 12 MG/DL (ref 6–20)
BUN/CREAT SERPL: 17 (ref 12–20)
CALCIUM SERPL-MCNC: 8.3 MG/DL (ref 8.5–10.1)
CHLORIDE SERPL-SCNC: 109 MMOL/L (ref 97–108)
CO2 SERPL-SCNC: 24 MMOL/L (ref 21–32)
CREAT SERPL-MCNC: 0.7 MG/DL (ref 0.55–1.02)
EOSINOPHIL # BLD: 0.1 K/UL (ref 0–0.4)
EOSINOPHIL NFR BLD: 1 % (ref 0–7)
ERYTHROCYTE [DISTWIDTH] IN BLOOD BY AUTOMATED COUNT: 14.9 % (ref 11.5–14.5)
GLUCOSE BLD STRIP.AUTO-MCNC: 81 MG/DL (ref 65–100)
GLUCOSE SERPL-MCNC: 89 MG/DL (ref 65–100)
HCT VFR BLD AUTO: 33.3 % (ref 35–47)
HGB BLD-MCNC: 10.1 G/DL (ref 11.5–16)
INR BLD: 1.2 (ref 0.9–1.2)
LYMPHOCYTES # BLD: 3.2 K/UL (ref 0.8–3.5)
LYMPHOCYTES NFR BLD: 39 % (ref 12–49)
MCH RBC QN AUTO: 23.8 PG (ref 26–34)
MCHC RBC AUTO-ENTMCNC: 30.3 G/DL (ref 30–36.5)
MCV RBC AUTO: 78.5 FL (ref 80–99)
MONOCYTES # BLD: 0.6 K/UL (ref 0–1)
MONOCYTES NFR BLD: 7 % (ref 5–13)
NEUTS SEG # BLD: 4.3 K/UL (ref 1.8–8)
NEUTS SEG NFR BLD: 52 % (ref 32–75)
PLATELET # BLD AUTO: 328 K/UL (ref 150–400)
POTASSIUM SERPL-SCNC: 4.3 MMOL/L (ref 3.5–5.1)
RBC # BLD AUTO: 4.24 M/UL (ref 3.8–5.2)
SERVICE CMNT-IMP: NORMAL
SODIUM SERPL-SCNC: 140 MMOL/L (ref 136–145)
WBC # BLD AUTO: 8.2 K/UL (ref 3.6–11)

## 2017-12-26 PROCEDURE — 70450 CT HEAD/BRAIN W/O DYE: CPT

## 2017-12-26 PROCEDURE — 80048 BASIC METABOLIC PNL TOTAL CA: CPT | Performed by: EMERGENCY MEDICINE

## 2017-12-26 PROCEDURE — 80076 HEPATIC FUNCTION PANEL: CPT | Performed by: EMERGENCY MEDICINE

## 2017-12-26 PROCEDURE — 36415 COLL VENOUS BLD VENIPUNCTURE: CPT | Performed by: EMERGENCY MEDICINE

## 2017-12-26 PROCEDURE — 71010 XR CHEST PORT: CPT

## 2017-12-26 PROCEDURE — 83880 ASSAY OF NATRIURETIC PEPTIDE: CPT | Performed by: EMERGENCY MEDICINE

## 2017-12-26 PROCEDURE — 82962 GLUCOSE BLOOD TEST: CPT

## 2017-12-26 PROCEDURE — 93005 ELECTROCARDIOGRAM TRACING: CPT

## 2017-12-26 PROCEDURE — 84484 ASSAY OF TROPONIN QUANT: CPT | Performed by: EMERGENCY MEDICINE

## 2017-12-26 PROCEDURE — 99285 EMERGENCY DEPT VISIT HI MDM: CPT

## 2017-12-26 PROCEDURE — 85610 PROTHROMBIN TIME: CPT

## 2017-12-26 PROCEDURE — 85025 COMPLETE CBC W/AUTO DIFF WBC: CPT | Performed by: EMERGENCY MEDICINE

## 2017-12-26 RX ORDER — GUAIFENESIN 100 MG/5ML
324 LIQUID (ML) ORAL
Status: COMPLETED | OUTPATIENT
Start: 2017-12-26 | End: 2017-12-27

## 2017-12-26 NOTE — IP AVS SNAPSHOT
[address] [facility] [address] [place] [address] 
[phone] Patient: Zelda Serrano MRN: [id number] AUQ:[id number] About your hospitalization You were admitted on:  December 27, 2017 You last received care in the:  [facility] [place] 5 OBSERVATION You were discharged on:  December 27, 2017 Why you were hospitalized Your primary diagnosis was:  Tia (Transient Ischemic Attack) Things You Need To Do (next 8 weeks) Follow up with Toña Martin MD in 1 week(s)  
review blood work Phone:  [phone] Where:  [address] [facility], [address], [address] [place] [address] [address], [place] [facility] [address] Follow up with Soraya Duong DO in 4 week(s) Phone:  [phone] Where:  [address] [facility], [place] [address] [address], [facility] at [facility], [address] [place] [address] Tuesday Jan 02, 2018 ESTABLISHED PATIENT with Allison Schneider MD at 10:40 AM  
Where:  [phone] ([facility]) Discharge Orders None A check junaid indicates which time of day the medication should be taken. My Medications TAKE these medications as instructed Instructions Each Dose to Equal  
 Morning Noon Evening Bedtime  
 albuterol 90 mcg/actuation inhaler Commonly known as:  VENTOLIN HFA Your last dose was: Your next dose is: Take 2 Puffs by inhalation every four (4) hours as needed for Wheezing. 2 Puff  
    
   
   
   
  
 aspirin 81 mg chewable tablet Start taking on:  12/28/2017 Your last dose was: Your next dose is: Take 1 Tab by mouth daily for 30 days. 81 mg  
    
   
   
   
  
 atorvastatin 20 mg tablet Commonly known as:  LIPITOR Your last dose was: Your next dose is: Take 1 Tab by mouth daily for 30 days. 20 mg  
    
   
   
   
  
 BIOTIN PO Your last dose was: Your next dose is: Take  by mouth.  
     
   
   
   
  
 cyanocobalamin 500 mcg tablet Commonly known as:  VITAMIN B12 Your last dose was: Your next dose is: Take 500 mcg by mouth daily. 500 mcg  
    
   
   
   
  
 ergocalciferol 50,000 unit capsule Commonly known as:  ERGOCALCIFEROL Your last dose was: Your next dose is: Take 1 Cap by mouth every seven (7) days. Indications: VITAMIN D DEFICIENCY (HIGH DOSE THERAPY) [address] Units  
    
   
   
   
  
 fluticasone-salmeterol 500-50 mcg/dose diskus inhaler Commonly known as:  ADVAIR Your last dose was: Your next dose is: Take 1 Puff by inhalation every twelve (12) hours. 1 Puff  
    
   
   
   
  
 hyoscyamine SL 0.125 mg SL tablet Commonly known as:  LEVSIN/SL Your last dose was: Your next dose is:    
   
   
 1 Tab by SubLINGual route every four (4) hours as needed for Cramping.  
 0.125 mg  
    
   
   
   
  
 lidocaine 5 % Commonly known as:  Jorge Bonderek Your last dose was: Your next dose is:    
   
   
 1 Patch by TransDERmal route as needed. Apply patch to the affected area for 12 hours a day and remove for 12 hours a day. 1 Patch  
    
   
   
   
  
 montelukast 10 mg tablet Commonly known as:  SINGULAIR Your last dose was: Your next dose is: Take 1 Tab by mouth daily. 10 mg  
    
   
   
   
  
 multivitamin with iron tablet Your last dose was: Your next dose is: Take 1 Tab by mouth daily. 1 Tab  
    
   
   
   
  
 omeprazole 40 mg capsule Commonly known as:  PRILOSEC Your last dose was: Your next dose is: Take 1 Cap by mouth daily. 40 mg  
    
   
   
   
  
 SPIRIVA WITH HANDIHALER 18 mcg inhalation capsule Generic drug:  tiotropium Your last dose was: Your next dose is: Take 1 Cap by inhalation daily. 1 Cap Where to Get Your Medications Information on where to get these meds will be given to you by the nurse or doctor. ! Ask your nurse or doctor about these medications  
  aspirin 81 mg chewable tablet  
 atorvastatin 20 mg tablet Discharge Instructions Discharge Instructions PATIENT ID: Abi Ray MRN: [id number] YOB: 1980 DATE OF ADMISSION: 12/26/2017 10:32 PM   
DATE OF DISCHARGE: 12/27/2017 PRIMARY CARE PROVIDER: Chris Tobias MD  
 
ATTENDING PHYSICIAN: Bekah Ortiz MD 
DISCHARGING PROVIDER: Bekah Ortiz MD   
To contact this individual call [phone] and ask the  to page. If unavailable ask to be transferred the Adult Hospitalist Department. DISCHARGE DIAGNOSES TIA 
 
CONSULTATIONS: IP CONSULT TO NEUROLOGY PROCEDURES/SURGERIES: * No surgery found * PENDING TEST RESULTS:  
At the time of discharge the following test results are still pending: FOLLOW UP APPOINTMENTS:  
Follow-up Information Follow up With Details Comments Contact Info Chris Tobias MD In 1 week review blood work [address] 
[address] [facility] [facility] [employer] [place] [address] 
[phone] Stefano Kelley DO In 4 weeks  [address] Neurology Clinic at \[facility]\"" [address] 
[phone] ADDITIONAL CARE RECOMMENDATIONS:  
 
DIET: Regular Diet and Cardiac Diet ACTIVITY: Activity as tolerated WOUND CARE:  
 
EQUIPMENT needed:  
 
 
  
 SNF/Inpatient Rehab/LTAC Independent/assisted living Hospice Other: CDMP Checked:  
Yes x PROBLEM LIST Updated: 
Yes x DISCHARGE SUMMARY from Nurse The discharge information has been reviewed with the patient. The patient verbalized understanding. Discharge medications reviewed with the patient and appropriate educational materials and side effects teaching were provided. ___________________________________________________________________________________________________________________________________ Signed:  
Rica Givens MD 
12/27/2017 
2:41 PM 
 
  
  
  
[employer] Announcement We are excited to announce that we are making your provider's discharge notes available to you in [employer]. You will see these notes when they are completed and signed by the physician that discharged you from your recent hospital stay. If you have any questions or concerns about any information you see in [employer], please call the Health Information Department where you were seen or reach out to your Primary Care Provider for more information about your plan of care. Introducing \[facility] [place] [facility]\"" & HEALTH SERVICES! [address] introduces [employer] patient portal. Now you can access parts of your medical record, email your doctor's office, and request medication refills online. 1. In your internet browser, go to [email or web address] 2. Click on the First Time User? Click Here link in the Sign In box. You will see the New Member Sign Up page. 3. Enter your [employer] Access Code exactly as it appears below.  You will not need to use this code after youve completed the sign-up process. If you do not sign up before the expiration date, you must request a new code. · [employer] Access Code: [email or web address] Expires: 3/26/2018 11:25 PM 
 
4. Enter the last four digits of your Social Security Number (xxxx) and Date of Birth (mm/dd/yyyy) as indicated and click Submit. You will be taken to the next sign-up page. 5. Create a [employer] ID. This will be your [employer] login ID and cannot be changed, so think of one that is secure and easy to remember. 6. Create a [employer] password. You can change your password at any time. 7. Enter your Password Reset Question and Answer. This can be used at a later time if you forget your password. 8. Enter your e-mail address. You will receive e-mail notification when new information is available in [address] 9. Click Sign Up. You can now view and download portions of your medical record. 10. Click the Download Summary menu link to download a portable copy of your medical information. If you have questions, please visit the Frequently Asked Questions section of the [employer] website. Remember, [employer] is NOT to be used for urgent needs. For medical emergencies, dial 911. Now available from your iPhone and Android! Unresulted Labs-Please follow up with your PCP about these lab tests Order Current Status ANTITHROMBIN III ACTIVITY In process B-2 GLYCOPROTEIN AB, IGG/IGM In process CARDIOLIPIN AB PANEL In process FACTOR II ACTIVITY In process FACTOR V LEIDEN In process FACTOR VIII In process LUPUS ANTICOAGULANT PANEL W/ REFLEX In process PROTEIN C ACTIVITY In process PROTEIN S, FUNCTIONAL In process DUPLEX CAROTID BILATERAL Preliminary result Providers Seen During Your Hospitalization Provider Specialty Primary office phone Montana Mederos MD Emergency Medicine [phone] Roberto Lombardi MD Internal Medicine [phone] Corwin Mccabe MD Internal Medicine [phone] Your Primary Care Physician (PCP) Primary Care Physician Office Phone Office Fax Blaise Fagan [phone] You are allergic to the following No active allergies Recent Documentation Weight BMI OB Status Smoking Status  
  
  
 130.9 kg 49.52 kg/m2 Having regular periods Never Smoker Emergency Contacts Name Discharge Info Relation Home Work Mobile [facility] DISCHARGE CAREGIVER [3] Sister [23] [phone] Inessa Merlos  Other Relative [6] [phone] Patient Belongings The following personal items are in your possession at time of discharge: 
     Visual Aid: None Please provide this summary of care documentation to your next provider. Signatures-by signing, you are acknowledging that this After Visit Summary has been reviewed with you and you have received a copy. Patient Signature:  ____________________________________________________________ Date:  ____________________________________________________________  
  
Breann Armijo Provider Signature:  ____________________________________________________________ Date:  ____________________________________________________________

## 2017-12-27 ENCOUNTER — APPOINTMENT (OUTPATIENT)
Dept: MRI IMAGING | Age: 37
End: 2017-12-27
Attending: INTERNAL MEDICINE
Payer: MEDICAID

## 2017-12-27 VITALS
BODY MASS INDEX: 49.52 KG/M2 | RESPIRATION RATE: 19 BRPM | HEART RATE: 81 BPM | DIASTOLIC BLOOD PRESSURE: 62 MMHG | OXYGEN SATURATION: 99 % | TEMPERATURE: 98.6 F | WEIGHT: 288.5 LBS | SYSTOLIC BLOOD PRESSURE: 98 MMHG

## 2017-12-27 PROBLEM — G45.9 TIA (TRANSIENT ISCHEMIC ATTACK): Status: RESOLVED | Noted: 2017-12-27 | Resolved: 2017-12-27

## 2017-12-27 PROBLEM — G45.9 TIA (TRANSIENT ISCHEMIC ATTACK): Status: ACTIVE | Noted: 2017-12-27

## 2017-12-27 LAB
ALBUMIN SERPL-MCNC: 2.8 G/DL (ref 3.5–5)
ALBUMIN SERPL-MCNC: 3.3 G/DL (ref 3.5–5)
ALBUMIN/GLOB SERPL: 0.8 {RATIO} (ref 1.1–2.2)
ALBUMIN/GLOB SERPL: 0.8 {RATIO} (ref 1.1–2.2)
ALP SERPL-CCNC: 103 U/L (ref 45–117)
ALP SERPL-CCNC: 87 U/L (ref 45–117)
ALT SERPL-CCNC: 14 U/L (ref 12–78)
ALT SERPL-CCNC: 18 U/L (ref 12–78)
ANION GAP SERPL CALC-SCNC: 7 MMOL/L (ref 5–15)
AST SERPL-CCNC: 11 U/L (ref 15–37)
AST SERPL-CCNC: 19 U/L (ref 15–37)
ATRIAL RATE: 58 BPM
BASOPHILS # BLD: 0 K/UL (ref 0–0.1)
BASOPHILS NFR BLD: 0 % (ref 0–1)
BILIRUB DIRECT SERPL-MCNC: <0.1 MG/DL (ref 0–0.2)
BILIRUB SERPL-MCNC: 0.2 MG/DL (ref 0.2–1)
BILIRUB SERPL-MCNC: 0.2 MG/DL (ref 0.2–1)
BNP SERPL-MCNC: 227 PG/ML (ref 0–125)
BUN SERPL-MCNC: 12 MG/DL (ref 6–20)
BUN/CREAT SERPL: 21 (ref 12–20)
CALCIUM SERPL-MCNC: 8.2 MG/DL (ref 8.5–10.1)
CALCULATED P AXIS, ECG09: -11 DEGREES
CALCULATED R AXIS, ECG10: 40 DEGREES
CALCULATED T AXIS, ECG11: 9 DEGREES
CHLORIDE SERPL-SCNC: 111 MMOL/L (ref 97–108)
CHOLEST SERPL-MCNC: 144 MG/DL
CK MB CFR SERPL CALC: NORMAL % (ref 0–2.5)
CK MB SERPL-MCNC: <1 NG/ML (ref 5–25)
CK SERPL-CCNC: 85 U/L (ref 26–192)
CO2 SERPL-SCNC: 25 MMOL/L (ref 21–32)
CREAT SERPL-MCNC: 0.58 MG/DL (ref 0.55–1.02)
DIAGNOSIS, 93000: NORMAL
EOSINOPHIL # BLD: 0 K/UL (ref 0–0.4)
EOSINOPHIL NFR BLD: 1 % (ref 0–7)
ERYTHROCYTE [DISTWIDTH] IN BLOOD BY AUTOMATED COUNT: 14.9 % (ref 11.5–14.5)
EST. AVERAGE GLUCOSE BLD GHB EST-MCNC: NORMAL MG/DL
FACT VIII ACT/NOR PPP: 152 % (ref 80–200)
GLOBULIN SER CALC-MCNC: 3.6 G/DL (ref 2–4)
GLOBULIN SER CALC-MCNC: 4.4 G/DL (ref 2–4)
GLUCOSE SERPL-MCNC: 92 MG/DL (ref 65–100)
HBA1C MFR BLD: 4.7 % (ref 4.2–6.3)
HCT VFR BLD AUTO: 28.6 % (ref 35–47)
HDLC SERPL-MCNC: 46 MG/DL
HDLC SERPL: 3.1 {RATIO} (ref 0–5)
HGB BLD-MCNC: 8.8 G/DL (ref 11.5–16)
LDLC SERPL CALC-MCNC: 85.8 MG/DL (ref 0–100)
LIPID PROFILE,FLP: NORMAL
LYMPHOCYTES # BLD: 2.3 K/UL (ref 0.8–3.5)
LYMPHOCYTES NFR BLD: 36 % (ref 12–49)
MAGNESIUM SERPL-MCNC: 1.9 MG/DL (ref 1.6–2.4)
MCH RBC QN AUTO: 24.1 PG (ref 26–34)
MCHC RBC AUTO-ENTMCNC: 30.8 G/DL (ref 30–36.5)
MCV RBC AUTO: 78.4 FL (ref 80–99)
MONOCYTES # BLD: 0.6 K/UL (ref 0–1)
MONOCYTES NFR BLD: 9 % (ref 5–13)
NEUTS SEG # BLD: 3.6 K/UL (ref 1.8–8)
NEUTS SEG NFR BLD: 54 % (ref 32–75)
P-R INTERVAL, ECG05: 142 MS
PHOSPHATE SERPL-MCNC: 3.6 MG/DL (ref 2.6–4.7)
PLATELET # BLD AUTO: 283 K/UL (ref 150–400)
POTASSIUM SERPL-SCNC: 3.9 MMOL/L (ref 3.5–5.1)
PROT SERPL-MCNC: 6.4 G/DL (ref 6.4–8.2)
PROT SERPL-MCNC: 7.7 G/DL (ref 6.4–8.2)
Q-T INTERVAL, ECG07: 392 MS
QRS DURATION, ECG06: 80 MS
QTC CALCULATION (BEZET), ECG08: 384 MS
RBC # BLD AUTO: 3.65 M/UL (ref 3.8–5.2)
SODIUM SERPL-SCNC: 143 MMOL/L (ref 136–145)
TRIGL SERPL-MCNC: 61 MG/DL (ref ?–150)
TROPONIN I SERPL-MCNC: <0.04 NG/ML
TROPONIN I SERPL-MCNC: <0.04 NG/ML
TSH SERPL DL<=0.05 MIU/L-ACNC: 2 UIU/ML (ref 0.36–3.74)
VENTRICULAR RATE, ECG03: 58 BPM
VLDLC SERPL CALC-MCNC: 12.2 MG/DL
WBC # BLD AUTO: 6.6 K/UL (ref 3.6–11)

## 2017-12-27 PROCEDURE — 84100 ASSAY OF PHOSPHORUS: CPT | Performed by: INTERNAL MEDICINE

## 2017-12-27 PROCEDURE — 80053 COMPREHEN METABOLIC PANEL: CPT | Performed by: INTERNAL MEDICINE

## 2017-12-27 PROCEDURE — 80061 LIPID PANEL: CPT | Performed by: INTERNAL MEDICINE

## 2017-12-27 PROCEDURE — 96372 THER/PROPH/DIAG INJ SC/IM: CPT

## 2017-12-27 PROCEDURE — 83735 ASSAY OF MAGNESIUM: CPT | Performed by: INTERNAL MEDICINE

## 2017-12-27 PROCEDURE — 86146 BETA-2 GLYCOPROTEIN ANTIBODY: CPT | Performed by: PSYCHIATRY & NEUROLOGY

## 2017-12-27 PROCEDURE — 74011250637 HC RX REV CODE- 250/637: Performed by: PSYCHIATRY & NEUROLOGY

## 2017-12-27 PROCEDURE — 85306 CLOT INHIBIT PROT S FREE: CPT | Performed by: PSYCHIATRY & NEUROLOGY

## 2017-12-27 PROCEDURE — 74011250636 HC RX REV CODE- 250/636: Performed by: INTERNAL MEDICINE

## 2017-12-27 PROCEDURE — 74011250637 HC RX REV CODE- 250/637: Performed by: INTERNAL MEDICINE

## 2017-12-27 PROCEDURE — 82550 ASSAY OF CK (CPK): CPT | Performed by: INTERNAL MEDICINE

## 2017-12-27 PROCEDURE — 93880 EXTRACRANIAL BILAT STUDY: CPT

## 2017-12-27 PROCEDURE — 74011250637 HC RX REV CODE- 250/637: Performed by: EMERGENCY MEDICINE

## 2017-12-27 PROCEDURE — 85025 COMPLETE CBC W/AUTO DIFF WBC: CPT | Performed by: INTERNAL MEDICINE

## 2017-12-27 PROCEDURE — 83036 HEMOGLOBIN GLYCOSYLATED A1C: CPT | Performed by: INTERNAL MEDICINE

## 2017-12-27 PROCEDURE — 85300 ANTITHROMBIN III ACTIVITY: CPT | Performed by: PSYCHIATRY & NEUROLOGY

## 2017-12-27 PROCEDURE — 85240 CLOT FACTOR VIII AHG 1 STAGE: CPT | Performed by: PSYCHIATRY & NEUROLOGY

## 2017-12-27 PROCEDURE — 70544 MR ANGIOGRAPHY HEAD W/O DYE: CPT

## 2017-12-27 PROCEDURE — 74011000250 HC RX REV CODE- 250: Performed by: INTERNAL MEDICINE

## 2017-12-27 PROCEDURE — 99218 HC RM OBSERVATION: CPT

## 2017-12-27 PROCEDURE — 70551 MRI BRAIN STEM W/O DYE: CPT

## 2017-12-27 PROCEDURE — 36415 COLL VENOUS BLD VENIPUNCTURE: CPT | Performed by: INTERNAL MEDICINE

## 2017-12-27 PROCEDURE — 86147 CARDIOLIPIN ANTIBODY EA IG: CPT | Performed by: PSYCHIATRY & NEUROLOGY

## 2017-12-27 PROCEDURE — 85303 CLOT INHIBIT PROT C ACTIVITY: CPT | Performed by: PSYCHIATRY & NEUROLOGY

## 2017-12-27 PROCEDURE — 93306 TTE W/DOPPLER COMPLETE: CPT

## 2017-12-27 PROCEDURE — 85210 CLOT FACTOR II PROTHROM SPEC: CPT | Performed by: PSYCHIATRY & NEUROLOGY

## 2017-12-27 PROCEDURE — 85613 RUSSELL VIPER VENOM DILUTED: CPT | Performed by: PSYCHIATRY & NEUROLOGY

## 2017-12-27 PROCEDURE — 81241 F5 GENE: CPT | Performed by: PSYCHIATRY & NEUROLOGY

## 2017-12-27 PROCEDURE — 94640 AIRWAY INHALATION TREATMENT: CPT

## 2017-12-27 PROCEDURE — 84443 ASSAY THYROID STIM HORMONE: CPT | Performed by: INTERNAL MEDICINE

## 2017-12-27 RX ORDER — ACETAMINOPHEN 325 MG/1
650 TABLET ORAL
Status: DISCONTINUED | OUTPATIENT
Start: 2017-12-27 | End: 2017-12-27 | Stop reason: HOSPADM

## 2017-12-27 RX ORDER — GUAIFENESIN 100 MG/5ML
81 LIQUID (ML) ORAL DAILY
Qty: 30 TAB | Refills: 0 | Status: SHIPPED | OUTPATIENT
Start: 2017-12-28 | End: 2018-01-27

## 2017-12-27 RX ORDER — ATORVASTATIN CALCIUM 20 MG/1
20 TABLET, FILM COATED ORAL DAILY
Status: DISCONTINUED | OUTPATIENT
Start: 2017-12-27 | End: 2017-12-27 | Stop reason: HOSPADM

## 2017-12-27 RX ORDER — PANTOPRAZOLE SODIUM 40 MG/1
40 TABLET, DELAYED RELEASE ORAL DAILY
Status: DISCONTINUED | OUTPATIENT
Start: 2017-12-27 | End: 2017-12-27 | Stop reason: HOSPADM

## 2017-12-27 RX ORDER — DOCUSATE SODIUM 100 MG/1
100 CAPSULE, LIQUID FILLED ORAL 2 TIMES DAILY
Status: DISCONTINUED | OUTPATIENT
Start: 2017-12-27 | End: 2017-12-27 | Stop reason: HOSPADM

## 2017-12-27 RX ORDER — MONTELUKAST SODIUM 5 MG/1
10 TABLET, CHEWABLE ORAL DAILY
Status: DISCONTINUED | OUTPATIENT
Start: 2017-12-27 | End: 2017-12-27 | Stop reason: HOSPADM

## 2017-12-27 RX ORDER — SODIUM CHLORIDE 0.9 % (FLUSH) 0.9 %
5-10 SYRINGE (ML) INJECTION AS NEEDED
Status: DISCONTINUED | OUTPATIENT
Start: 2017-12-27 | End: 2017-12-27 | Stop reason: HOSPADM

## 2017-12-27 RX ORDER — IPRATROPIUM BROMIDE AND ALBUTEROL SULFATE 2.5; .5 MG/3ML; MG/3ML
3 SOLUTION RESPIRATORY (INHALATION)
Status: DISCONTINUED | OUTPATIENT
Start: 2017-12-27 | End: 2017-12-27 | Stop reason: HOSPADM

## 2017-12-27 RX ORDER — ERGOCALCIFEROL 1.25 MG/1
50000 CAPSULE ORAL
Status: DISCONTINUED | OUTPATIENT
Start: 2017-12-31 | End: 2017-12-27 | Stop reason: HOSPADM

## 2017-12-27 RX ORDER — LIDOCAINE 50 MG/G
1 PATCH TOPICAL DAILY PRN
Status: DISCONTINUED | OUTPATIENT
Start: 2017-12-27 | End: 2017-12-27 | Stop reason: HOSPADM

## 2017-12-27 RX ORDER — HYDROCODONE BITARTRATE AND ACETAMINOPHEN 5; 325 MG/1; MG/1
1 TABLET ORAL
Status: DISCONTINUED | OUTPATIENT
Start: 2017-12-27 | End: 2017-12-27 | Stop reason: HOSPADM

## 2017-12-27 RX ORDER — ARFORMOTEROL TARTRATE 15 UG/2ML
15 SOLUTION RESPIRATORY (INHALATION)
Status: DISCONTINUED | OUTPATIENT
Start: 2017-12-27 | End: 2017-12-27 | Stop reason: HOSPADM

## 2017-12-27 RX ORDER — HYOSCYAMINE SULFATE 0.12 MG/1
0.12 TABLET SUBLINGUAL
Status: DISCONTINUED | OUTPATIENT
Start: 2017-12-27 | End: 2017-12-27 | Stop reason: HOSPADM

## 2017-12-27 RX ORDER — SODIUM CHLORIDE 0.9 % (FLUSH) 0.9 %
5-10 SYRINGE (ML) INJECTION EVERY 8 HOURS
Status: DISCONTINUED | OUTPATIENT
Start: 2017-12-27 | End: 2017-12-27 | Stop reason: HOSPADM

## 2017-12-27 RX ORDER — FLUTICASONE PROPIONATE AND SALMETEROL 500; 50 UG/1; UG/1
1 POWDER RESPIRATORY (INHALATION) EVERY 12 HOURS
Status: DISCONTINUED | OUTPATIENT
Start: 2017-12-27 | End: 2017-12-27 | Stop reason: ALTCHOICE

## 2017-12-27 RX ORDER — ONDANSETRON 2 MG/ML
4 INJECTION INTRAMUSCULAR; INTRAVENOUS
Status: DISCONTINUED | OUTPATIENT
Start: 2017-12-27 | End: 2017-12-27 | Stop reason: HOSPADM

## 2017-12-27 RX ORDER — GUAIFENESIN 100 MG/5ML
81 LIQUID (ML) ORAL DAILY
Status: DISCONTINUED | OUTPATIENT
Start: 2017-12-27 | End: 2017-12-27 | Stop reason: HOSPADM

## 2017-12-27 RX ORDER — ATORVASTATIN CALCIUM 20 MG/1
20 TABLET, FILM COATED ORAL DAILY
Qty: 30 TAB | Refills: 0 | Status: SHIPPED | OUTPATIENT
Start: 2017-12-27 | End: 2018-01-26

## 2017-12-27 RX ORDER — BUDESONIDE 0.5 MG/2ML
500 INHALANT ORAL
Status: DISCONTINUED | OUTPATIENT
Start: 2017-12-27 | End: 2017-12-27 | Stop reason: HOSPADM

## 2017-12-27 RX ORDER — LANOLIN ALCOHOL/MO/W.PET/CERES
500 CREAM (GRAM) TOPICAL DAILY
Status: DISCONTINUED | OUTPATIENT
Start: 2017-12-27 | End: 2017-12-27 | Stop reason: HOSPADM

## 2017-12-27 RX ORDER — ENOXAPARIN SODIUM 100 MG/ML
40 INJECTION SUBCUTANEOUS EVERY 24 HOURS
Status: DISCONTINUED | OUTPATIENT
Start: 2017-12-27 | End: 2017-12-27 | Stop reason: HOSPADM

## 2017-12-27 RX ADMIN — ATORVASTATIN CALCIUM 20 MG: 20 TABLET, FILM COATED ORAL at 15:02

## 2017-12-27 RX ADMIN — IPRATROPIUM BROMIDE AND ALBUTEROL SULFATE 3 ML: .5; 3 SOLUTION RESPIRATORY (INHALATION) at 09:39

## 2017-12-27 RX ADMIN — Medication 10 ML: at 06:38

## 2017-12-27 RX ADMIN — BUDESONIDE 500 MCG: 0.5 INHALANT RESPIRATORY (INHALATION) at 09:38

## 2017-12-27 RX ADMIN — CYANOCOBALAMIN TAB 500 MCG 500 MCG: 500 TAB at 08:57

## 2017-12-27 RX ADMIN — DOCUSATE SODIUM 100 MG: 100 CAPSULE, LIQUID FILLED ORAL at 08:57

## 2017-12-27 RX ADMIN — PANTOPRAZOLE SODIUM 40 MG: 40 TABLET, DELAYED RELEASE ORAL at 08:57

## 2017-12-27 RX ADMIN — MONTELUKAST SODIUM 10 MG: 5 TABLET, CHEWABLE ORAL at 08:57

## 2017-12-27 RX ADMIN — MULTIPLE VITAMINS W/ MINERALS TAB 1 TABLET: TAB at 08:57

## 2017-12-27 RX ADMIN — ASPIRIN 81 MG 81 MG: 81 TABLET ORAL at 08:57

## 2017-12-27 RX ADMIN — ENOXAPARIN SODIUM 40 MG: 40 INJECTION SUBCUTANEOUS at 08:57

## 2017-12-27 RX ADMIN — ASPIRIN 81 MG 324 MG: 81 TABLET ORAL at 00:22

## 2017-12-27 NOTE — PROGRESS NOTES
Problem: Falls - Risk of  Goal: *Absence of Falls  Document Arnoldo Fall Risk and appropriate interventions in the flowsheet.    Outcome: Progressing Towards Goal  Fall Risk Interventions:            Medication Interventions: Patient to call before getting OOB

## 2017-12-27 NOTE — PROGRESS NOTES
TSBAR, Recent Results and Cardiac Rhythm NSRRANSFER - IN REPORT:    Verbal report received from Lisa(name) on Sara Brock  being received from ER(unit) for routine progression of care      Report consisted of patients Situation, Background, Assessment and   Recommendations(SBAR). Information from the following report(NSRSBAR, Recent Results and Cardiac Rhythm NSR was reviewed with the receiving nurse. Opportunity for questions and clarification was provided. Assessment completed upon patients arrival to unit and care assumed.

## 2017-12-27 NOTE — ED NOTES
Dysphagia screening performed on patient, no coughing, sputtering or difficulty noted, O2 sats remained above 97% on RA

## 2017-12-27 NOTE — ED PROVIDER NOTES
HPI Comments: 30 y.o. female with past medical history significant for HTN, Gastric sleeve, COPD, obesity who presents from home via EMS with chief complaint of right sided weakness. Pt report, while standing braiding her mothers hair approximately 40 minutes ago, she c/o of numbness to her right hand at which time she stopped braiding. She sat down and became \"hot all over\" with slurred speech, thirsty, diaphoretic, nauseous and vomited twice. Pt recalls telling her mother to call 911 but then \"slumped over the right\". Pt states \"It felt out of body, I felt something going on but couldn't control it and couldn't sit up\". Per EMS, on arrival, pt was leaning to the right, with right facial droop and right sided weakness (RUE and RLE). \"She was pointing to her head saying it hurt\". En route, symptoms gradually improved, \"she was able to start speaking with yes and no answers although had trouble remembering certain things\", per EMS. While in ED, pt c/o pins and needles to right arm. She denies current headache. No hx of similar symptoms. No leg pain, leg swelling acute SOB/MCPHERSON, LOC, vision change, fever, chills,cough, congestion, urinary symptoms, constipation. Pt states that her bowel movements have been \"different\" over the past 2 days. No blood. Pt further notes recent hx of awaking gasping for air. This occurred two nights ago of unknown cause. Pt denies symptoms tonight. She takes Advair and Marion regularly. No recent surgeries. No known sick contact. There are no other acute medical concerns at this time. Social hx: Non-smoker. No significant FMHX for stroke. PCP: Tylor Koroma MD    Note written by Nusrat Arreaga, as dictated by Daniella Ascencio MD 10:52 PM    The history is provided by the patient. No  was used. Past Medical History:   Diagnosis Date    Advance directive discussed with patient 04/07/2016    Arthritis     Asthma     [facility]     Chronic obstructive pulmonary disease ([facility] [address]     3/16/15 notes from [facility] ER    Headache(784.0)     Hypertension     NO MEDS    Knee pain, acute 11/2013    [facility]    Morbid obesity (HCC)     Nausea & vomiting     Other ill-defined conditions(799.89)     recent ankle fx 11/21/12(not wearing air cast)    Psychiatric disorder     ANXIETY    Sleep apnea        Past Surgical History:   Procedure Laterality Date    HX GASTRECTOMY  12/29/2016    lap sleeve gastrectomy by Dr Yadira Rolle  2008    c section    HX GYN  t-14    d and c post miscarriage    HX GYN  12/5/12    HYSTEROSCOPIC REMOVAL OF IUD    HX KNEE ARTHROSCOPY      L KNEE    HX ORTHOPAEDIC      right hand    HX ORTHOPAEDIC      right tendon repair at age 11-16         Family History:   Problem Relation Age of Onset    Diabetes Mother     Hypertension Mother    [facility] Elevated Lipids Mother     Asthma Sister     Lung Disease Maternal Grandmother     Anesth Problems Neg Hx        Social History     Social History    Marital status: SINGLE     Spouse name: N/A    Number of children: N/A    Years of education: N/A     Occupational History    Not on file. Social History Main Topics    Smoking status: Never Smoker    Smokeless tobacco: Never Used    Alcohol use No    Drug use: No    Sexual activity: Not on file     Other Topics Concern    Not on file     Social History Narrative    ** Merged History Encounter **              ALLERGIES: Review of patient's allergies indicates no known allergies. Review of Systems   Constitutional: Positive for diaphoresis. Negative for chills and fever. HENT: Negative for congestion. Respiratory: Negative for cough and shortness of breath. Cardiovascular: Negative for chest pain. Gastrointestinal: Positive for nausea and vomiting. Negative for abdominal pain, blood in stool and constipation. Endocrine: Positive for polydipsia.    Genitourinary: Negative for difficulty urinating, dysuria and hematuria. Musculoskeletal: Negative for back pain and neck pain. Neurological: Positive for facial asymmetry (right sided), speech difficulty, weakness (RUE/RLE), numbness (RUE) and headaches. Negative for syncope. Vitals:    12/26/17 2247   BP: 122/66   Pulse: 60   Resp: 19   Temp: 98.1 °F (36.7 °C)   Weight: 130.9 kg (288 lb 8 oz)            Physical Exam   Constitutional: She is oriented to person, place, and time. She appears well-developed and well-nourished. HENT:   Head: Normocephalic and atraumatic. Nose: Nose normal.   Eyes: Conjunctivae and EOM are normal. Pupils are equal, round, and reactive to light. Neck: Normal range of motion. Neck supple. Cardiovascular: Normal rate, regular rhythm, normal heart sounds and intact distal pulses. Pulmonary/Chest: Effort normal and breath sounds normal.   Abdominal: Soft. Bowel sounds are normal.   Musculoskeletal: Normal range of motion. Neurological: She is oriented to person, place, and time. She has normal reflexes. 4/5  strength on right compared to left   Skin: Skin is warm and dry. Psychiatric: She has a normal mood and affect. Her behavior is normal.   Nursing note and vitals reviewed. Note written by Nusrat Mosher, as dictated by Montana Mederos MD 10:53 PM    [facility]  ED Course       Procedures    CONSULT NOTE:  10:38 PM Montana Mederos MD spoke with Dr. Layla Chatman , Consult for Teleneurology. Discussed available diagnostic tests and clinical findings. Abhi Ayala will see patient. CONSULT NOTE:  11:00 PM Montana Mederos MD spoke with Merlin Zimmer for Teleneurology.   returned call. Offered TPA but patient declined. Does not need CTA. Admit for stroke work up. ED EKG interpretation:  Rhythm: sinus bradycardia; and regular . Rate (approx.): 58; Axis: normal; P wave: normal; QRS interval: normal ; ST/T wave: non-specific changes This EKG was interpreted by Montana Mederos MD,ED Provider.     CONSULT NOTE:  12:11 AM Teo Ayoub Jonelle Galo MD spoke with Dr. Quinn Hi for Hospitalist.  Discussed available diagnostic tests and clinical findings. Dr. Radha Steel will see and admit patient for further evaluation.

## 2017-12-27 NOTE — CONSULTS
NEUROLOGY CONSULT NOTE    Name Hugo Pavon Age 30 y.o. MRN [id number]  1980     Consulting Physician: Deshawn Lozano MD      Chief Complaint:  R_HP     Assessment:     · Principal Problem:  ·   TIA (transient ischemic attack) (2017)  ·   ·   30year old AAF with a h/o HTN, LAKE, COPD, anxiety, gastric sleeve, miscarriage presenting with pre-syncopal episode followed by acute right hemiparesis. There appears to be some residual mild right sided weakness and hemisensory deficit today on examination. She does admit to headache following the event which persist today but no significant headache/migraine history. Presentation is concerning for L subcortical stroke vs TIA. CT head on admission did not reveal any acute process. CUS with no significant carotid stenosis and patent VAs b/l. Recommendations:   MRI Brain pending  Start ASA 81mg and statin therapy for stroke prevention (ordered)  Hypercoagulable w/u to exclude clotting disorder given young age of onset  Telemetry, TTE  PT/OT assessment  Stroke education  Goal SBP<140, LDL<70  Will f/u studies once completed  Please arrange for Neuro F/U in 4 weeks    Thank you very much for this referral. I appreciate the opportunity to participate in this patient's care. History of Present Illness: This is a 30 y.o.  right handed  female, we were asked to see for R-HP. PMH notable for asthma, HTN, LAKE, COPD, anxiety, gastric sleeve procedure. 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 She went to sit down and felt as if she may pass out. She vomited x 2 without full LOC. She then noted onset of R-sided weakness. EMS also reported R facial droop, R-HP. She was mumbling during the episode. No noted convulsions/seizure activity/AMS or associated urinary incontinence. Episode lasted approximately 30 minutes per family at bedside.   She did report headache afterwards and continued headache today, although denies a h/o significant headaches or migraines associated with focal neurologic deficits. On ED arrival head CT was completed and without acute process. She declined tPA for her sx. Of note, patient reported tingling/numbness of the RUE lasting several minutes upon awakening from sleep the evening prior without focal weakness, speech deficits or vision loss. She was not on antiplatelet therapy prior to presentation. She denies a h/o clotting disorder or FHX stroke in the young. She admits to having a miscarriage in the past.      No Known Allergies     Prior to Admission medications    Medication Sig Start Date End Date Taking? Authorizing Provider   BIOTIN PO Take  by mouth. Historical Provider   ergocalciferol (ERGOCALCIFEROL) 50,000 unit capsule Take 1 Cap by mouth every seven (7) days. Indications: VITAMIN D DEFICIENCY (HIGH DOSE THERAPY) 3/14/17   Gentry Uriarte NP   albuterol (VENTOLIN HFA) 90 mcg/actuation inhaler Take 2 Puffs by inhalation every four (4) hours as needed for Wheezing. 3/6/17   Lalitha England NP   multivitamin with iron tablet Take 1 Tab by mouth daily. Historical Provider   cyanocobalamin (VITAMIN B12) 500 mcg tablet Take 500 mcg by mouth daily. Historical Provider   omeprazole (PRILOSEC) 40 mg capsule Take 1 Cap by mouth daily. 12/8/16   Gentry Uriarte NP   hyoscyamine SL (LEVSIN/SL) 0.125 mg SL tablet 1 Tab by SubLINGual route every four (4) hours as needed for Cramping. 12/8/16   Gentry Uriarte NP   montelukast (SINGULAIR) 10 mg tablet Take 1 Tab by mouth daily. 5/11/16   Tylor Koroma MD   fluticasone-salmeterol (ADVAIR) 500-50 mcg/dose diskus inhaler Take 1 Puff by inhalation every twelve (12) hours. 5/11/16   Tylor Koroma MD   lidocaine (LIDODERM) 5 % 1 Patch by TransDERmal route as needed. Apply patch to the affected area for 12 hours a day and remove for 12 hours a day.  5/11/16   Tylor Koroma MD   tiotropium (SPIRIVA WITH HANDIHALER) 18 mcg inhalation capsule Take 1 Cap by inhalation daily. Ronda Delgado MD       Past Medical History:   Diagnosis Date    Advance directive discussed with patient 04/07/2016    Arthritis     Asthma     [facility]  Chronic obstructive pulmonary disease ([address]     3/16/15 notes from [facility]    Headache(784.0)     Hypertension     NO MEDS    Knee pain, acute 11/2013    [facility]    Morbid obesity (HCC)     Nausea & vomiting     Other ill-defined conditions(799.89)     recent ankle fx 11/21/12(not wearing air cast)    Psychiatric disorder     ANXIETY    Sleep apnea         Past Surgical History:   Procedure Laterality Date    HX GASTRECTOMY  12/29/2016    lap sleeve gastrectomy by Dr Jayda Andrews  2008    c section    HX GYN  t-14    d and c post miscarriage    HX GYN  12/5/12    HYSTEROSCOPIC REMOVAL OF IUD    HX KNEE ARTHROSCOPY      L KNEE    HX ORTHOPAEDIC      right hand    HX ORTHOPAEDIC      right tendon repair at age 11-16        Social History   Substance Use Topics    Smoking status: Never Smoker    Smokeless tobacco: Never Used    Alcohol use No        Family History   Problem Relation Age of Onset    Diabetes Mother     Hypertension Mother     Elevated Lipids Mother     Asthma Sister     Lung Disease Maternal Grandmother     Anesth Problems Neg Hx         Review of Systems:   Comprehensive review of systems performed and negative except for as listed above. Exam:     Visit Vitals    BP 98/62 (BP 1 Location: Left arm, BP Patient Position: At rest)    Pulse 81    Temp 98.6 °F (37 °C)    Resp 19    Wt 130.9 kg (288 lb 8 oz)    LMP 11/15/2017    SpO2 99%    BMI 49.52 kg/m2        General: Well developed, well nourished. Patient in no apparent distress   Head: Normocephalic, atraumatic, anicteric sclera   Lungs:  Clear to auscultation bilaterally, No wheezes or rubs   Cardiac: Regular rate and rhythm with no murmurs.    Abd: Bowel sounds were audible. No tenderness on palpation   Ext: No pedal edema   Skin: No overt signs of rash     Neurological Exam:  Mental Status: Alert and oriented to person place and time   Speech: Fluent no aphasia or dysarthria. Cranial Nerves:   Intact visual fields. Facial sensation is normal. Facial movement is symmetric. Palate is midline. Normal sternocleidomastoid strength. Tongue is midline. Hearing is intact bilaterally. Eyes: PERRL, EOM's full, no nystagmus, no ptosis. Motor:  5-/5 RUE/RLE with slight drift, LUE/LLE 5/5    Reflexes:   Deep tendon reflexes 2+/4 and symmetrical.  Plantar response is downgoing b/l. Sensory:   Decreased LT RLE, otherwise intact throughout   Gait:  Gait is deferred    Tremor:   No tremor noted. Cerebellar:  Finger to nose and heel over shin to knee was demonstrated competently. Neurovascular: No carotid bruits. No JVD       Imaging  CT Results (maximum last 3): Results from [place] encounter on 12/26/17   CT CODE NEURO HEAD WO CONTRAST   Narrative EXAM:  CT CODE NEURO HEAD WO CONTRAST  History: TIA, headache, right-sided weakness, aphasia  INDICATION:   tia, stroke    COMPARISON: None. CONTRAST:  None. TECHNIQUE: Unenhanced CT of the head was performed using 5 mm images. Brain and  bone windows were generated. CT dose reduction was achieved through use of a  standardized protocol tailored for this examination and automatic exposure  control for dose modulation. FINDINGS:  The ventricles and sulci are normal in size, shape and configuration and  midline. There is no significant white matter disease. There is no intracranial  hemorrhage, extra-axial collection, mass, mass effect or midline shift. The  basilar cisterns are open. No acute infarct is identified. The bone windows  demonstrate no abnormalities. The visualized portions of the paranasal sinuses  and mastoid air cells are clear.          Impression IMPRESSION:     No acute intracranial process            Lab Review  Lab Results   Component Value Date/Time    WBC 6.6 12/27/2017 06:36 AM    HCT 28.6 12/27/2017 06:36 AM    HGB 8.8 12/27/2017 06:36 AM    PLATELET 335 00/73/7031 06:36 AM     Lab Results   Component Value Date/Time    Sodium 143 12/27/2017 06:36 AM    Potassium 3.9 12/27/2017 06:36 AM    Chloride 111 12/27/2017 06:36 AM    CO2 25 12/27/2017 06:36 AM    Glucose 92 12/27/2017 06:36 AM    BUN 12 12/27/2017 06:36 AM    Creatinine 0.58 12/27/2017 06:36 AM    Calcium 8.2 12/27/2017 06:36 AM     No components found for: TROPQUANT  No results found for: NIKKI    Signed:  Libby Damian.  Alona Frey DO  12/27/2017  12:23 PM

## 2017-12-27 NOTE — PROGRESS NOTES
The following herbal, alternative, and/or nutritional/dietary supplement product(s) has been discontinued  per [facility] approved policy:    biotin     Please reorder upon discharge if appropriate.

## 2017-12-27 NOTE — DISCHARGE INSTRUCTIONS
Discharge Instructions       PATIENT ID: Crystal Thayer  MRN: [id number]   YOB: 1980    DATE OF ADMISSION: 12/26/2017 10:32 PM    DATE OF DISCHARGE: 12/27/2017    PRIMARY CARE PROVIDER: Nicolasa Solares MD     ATTENDING PHYSICIAN: Casimiro Phipps MD  DISCHARGING PROVIDER: Casimiro Phipps MD    To contact this individual call [phone] and ask the  to page. If unavailable ask to be transferred the Adult Hospitalist Department. DISCHARGE DIAGNOSES TIA    CONSULTATIONS: IP CONSULT TO NEUROLOGY    PROCEDURES/SURGERIES: * No surgery found *    PENDING TEST RESULTS:   At the time of discharge the following test results are still pending:     FOLLOW UP APPOINTMENTS:   Follow-up Information     Follow up With Details Comments Contact Info    Nicolasa Solares MD In 1 week review blood work [address] [address] [address]      Nahid Cat DO In 4 weeks  [address] [facility] at [address] [facility].  [phone]             ADDITIONAL CARE RECOMMENDATIONS:     DIET: Regular Diet and Cardiac Diet    ACTIVITY: Activity as tolerated    WOUND CARE:     EQUIPMENT needed:       DISCHARGE MEDICATIONS:   See Medication Reconciliation Form    · It is important that you take the medication exactly as they are prescribed. · Keep your medication in the bottles provided by the pharmacist and keep a list of the medication names, dosages, and times to be taken in your wallet. · Do not take other medications without consulting your doctor. NOTIFY YOUR PHYSICIAN FOR ANY OF THE FOLLOWING:   Fever over 101 degrees for 24 hours. Chest pain, shortness of breath, fever, chills, nausea, vomiting, diarrhea, change in mentation, falling, weakness, bleeding. Severe pain or pain not relieved by medications.   Or, any other signs or symptoms that you may have questions about.      DISPOSITION:  x  Home With:   OT  PT  HH  RN       SNF/Inpatient Rehab/LTAC    Independent/assisted living    Hospice    Other:     CDMP Checked:   Yes x     PROBLEM LIST Updated:  Yes x       DISCHARGE SUMMARY from Nurse    The discharge information has been reviewed with the patient. The patient verbalized understanding. Discharge medications reviewed with the patient and appropriate educational materials and side effects teaching were provided.   ___________________________________________________________________________________________________________________________________  Signed:   Favio Novak MD  12/27/2017  2:41 PM

## 2017-12-27 NOTE — H&P
[facility]  ACUTE CARE HISTORY AND PHYSICAL    Gary Walden  MR#: [id number]  : 1980  ACCOUNT #: [de-identified]   DATE OF SERVICE: 2017    PRIMARY CARE PHYSICIAN:  Sania Bryant MD    SOURCE OF INFORMATION:  Patient and patient's relatives, who were present at the bedside. CHIEF COMPLAINT:  Right-sided weakness. HISTORY OF PRESENT ILLNESS:  This is a 45-year-old woman with a past medical history significant for hypertension, morbid obesity, status post gastric sleeve, and asthma, who was in her usual state of health until the day of her presentation to the emergency room, when patient developed right-sided weakness. 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 Patient stated that she felt hot all over and became diaphoretic with nausea and vomiting. EMS was called, and when EMS arrived at the scene, it was reported that the patient had right facial droop in addition to the right-sided weakness; the right-sided weakness involved both right upper and right lower extremity. Patient has no prior history of stroke or TIA. The patient's symptoms slowly improved as she was being brought to the emergency room. When the patient arrived to the emergency room, Code Stroke was called. CT scan of the head was obtained, according to the stroke protocol. Patient was seen by Teleneurology at the request of the emergency room physician; tPA was considered and offered to the patient; however, the patient refused tPA. At the time of evaluation for admission, the patient's symptoms have largely resolved. Patient was referred to the hospitalist service for evaluation for admission. No associated fever, rigors or chills. PAST MEDICAL HISTORY:  Asthma, hypertension, morbid obesity, status post gastric sleeve.     PAST SURGICAL HISTORY:  This is significant for gastric sleeve and right hand surgery. ALLERGIES:  NO KNOWN DRUG ALLERGIES. MEDICATIONS:  Fam 90 mcg 2 puffs inhalation every 4 hours as needed for wheezing, Advair 500/50 inhalation every 12 hours, Lidoderm patch 5% applied to skin daily, Singulair 10 mg daily, multivitamin one tablet daily, Prilosec 40 mg daily and Spiriva 18 mcg inhalation daily. FAMILY HISTORY:  This was reviewed. Her mother has hypertension, diabetes and dyslipidemia. SOCIAL HISTORY:  No history of alcohol or tobacco abuse. REVIEW OF SYSTEMS  HEAD, EARS, EYES, NOSE AND THROAT:  This is positive for headache. No blurring of vision and no photophobia. RESPIRATORY:  No cough, no shortness of breath and no hemoptysis. CARDIOVASCULAR:  No chest pain, no orthopnea and no palpitations. GASTROINTESTINAL:  This is positive for nausea and vomiting, no diarrhea, no constipation. GENITOURINARY:  No dysuria, no urgency and no frequency. All other systems are reviewed, and they are negative. PHYSICAL EXAMINATION  GENERAL APPEARANCE:  Patient appeared ill, in moderate distress. VITAL SIGNS:  On arrival to the emergency room, temperature was 98.1, pulse rate 60, respiratory rate was 19, blood pressure 122/66, oxygen saturation 100%. HEENT:  Head normocephalic, atraumatic. Eyes:  Normal eye movements. No redness, no drainage and no discharge. Ears:  Normal external ears with no obvious drainage. Nose:  No deformity, no drainage. Mouth and throat:  No visible oral lesion. NECK:  Supple; no JVD, no thyromegaly. CHEST:  Clear breath sounds: No wheezing and no crackles. HEART:  Normal S1, S2, regular. No clinically appreciable murmur. ABDOMEN:  Soft, obese and nontender; normal bowel sounds. CENTRAL NERVOUS SYSTEM:  Alert and oriented x3. No gross focal neurological deficit. EXTREMITIES:  No edema. Pulses 2+ bilaterally. MUSCULOSKELETAL:  No obvious joint deformity or swelling.   SKIN:  No active skin lesion seen on the exposed part of the body.  PSYCHIATRIC:  Normal mood and affect. LYMPHATIC:  No cervical lymphadenopathy. DIAGNOSTIC DATA:  Chest x-ray:  No acute pathology. LABORATORY DATA:  CT scan of the head without contrast:  No acute pathology. EKG shows sinus bradycardia; no ST or T wave abnormalities. LABORATORY DATA:  ProBNP of 227; cardiac profile with troponin of less than 0.04. Chemistries:  Sodium 140, potassium 4.3, chloride 109, CO2 of 24, glucose 89, BUN 12, creatinine 0.70. Calcium 8.3. Total protein 7.7, albumin of 3.3, globulin 4.4, total bilirubin 0.2, alkaline phosphatase 103, AST 19, ALT 18. Hematology:  WBC 8.2, hemoglobin 10.1, hematocrit 33.3, platelets 241. ASSESSMENT  1. Transient ischemic attack. 2.  Asthma. 3.  Morbid obesity, status post gastric sleeve. 4.  Hypertension. 5.  Elevated brain natriuretic peptide level. 6.  Anemia. PLAN  1. TIA. We will place the patient on observation. We will obtain MRI/MRA of the brain and carotid Doppler of the neck, as well as echocardiogram to evaluate the patient for suspected stroke. We will check lipid panel. We will check A1c level. We will start the patient on aspirin. Inpatient Neurology consult will be requested to assist in evaluation and treatment. 2.  Asthma. We will place the patient on DuoNeb as needed. 3.  Morbid obesity, status post gastric sleeve. We will continue with supportive therapy. 4.  Hypertension. Patient no longer required medication for hypertension after her gastric sleeve. We will continue to monitor. 5.  Elevated BNP level. This is mild. We will obtain echocardiogram to determine whether the BNP level is cardiac in origin. 6.  Anemia. The anemia is most likely due to nutritional deficiency from the patient's gastric sleeve. Patient is on a vitamin supplement. We will continue with the vitamin supplement. 7.  Other issues. a.   Code status:  Patient is FULL CODE.   b.  We will place the patient on Lovenox for DVT prophylaxis.       MD KARON Bolanos / PAGE  D: 12/27/2017 02:58     T: 12/27/2017 03:48  JOB #: [id number]  CC: Daniel Villegas MD

## 2017-12-27 NOTE — DISCHARGE SUMMARY
Discharge Summary       PATIENT ID: Orly Mckeon  MRN: [id number]   YOB: 1980    DATE OF ADMISSION: 12/26/2017 10:32 PM    DATE OF DISCHARGE: 12/27/17   PRIMARY CARE PROVIDER: Miki Lewis MD     ATTENDING PHYSICIAN: Jase Cordero  DISCHARGING PROVIDER: Juan Manuel Ayoub MD    To contact this individual call [phone] and ask the  to page. If unavailable ask to be transferred the Adult Hospitalist Department. CONSULTATIONS: IP CONSULT TO NEUROLOGY    PROCEDURES/SURGERIES: * No surgery found *    ADMITTING DIAGNOSES & HOSPITAL COURSE:     DISCHARGE DIAGNOSES / PLAN:      1. TIA. MRI/ MRA/ CT/ Duplex carotid negative ECHO report pending can f/u with PCP  W/U for hyper coag state done and pt will follow up with PCP verbalizes understanding  2. Asthma. We will place the patient on DuoNeb as needed. 3.  Morbid obesity, status post gastric sleeve. We will continue with supportive therapy. 4.  Hypertension. Patient no longer required medication for hypertension after her gastric sleeve. We will continue to monitor. 5.  Elevated BNP level. This is mild. ECHO repeort pending can followup with PCP  6. Anemia. The anemia is most likely due to nutritional deficiency from the patient's gastric sleeve. Patient is on a vitamin supplement.         PENDING TEST RESULTS:   At the time of discharge the following test results are still pending: ECHO herb    FOLLOW UP APPOINTMENTS:    Follow-up Information     Follow up With Details Comments [address], MD In 1 week review blood work [address]  [address] [address]  [phone]      Antonieta Pennington DO In 4 weeks  [address] Neurology Clinic at [address] [facility].  [phone]             ADDITIONAL CARE RECOMMENDATIONS:     DIET: Cardiac Diet    ACTIVITY: Activity as tolerated    WOUND CARE:     EQUIPMENT needed: DISCHARGE MEDICATIONS:  Current Discharge Medication List      START taking these medications    Details   aspirin 81 mg chewable tablet Take 1 Tab by mouth daily for 30 days. Qty: 30 Tab, Refills: 0      atorvastatin (LIPITOR) 20 mg tablet Take 1 Tab by mouth daily for 30 days. Qty: 30 Tab, Refills: 0         CONTINUE these medications which have NOT CHANGED    Details   BIOTIN PO Take  by mouth.      ergocalciferol (ERGOCALCIFEROL) 50,000 unit capsule Take 1 Cap by mouth every seven (7) days. Indications: VITAMIN D DEFICIENCY (HIGH DOSE THERAPY)  Qty: 12 Cap, Refills: 2      albuterol (VENTOLIN HFA) 90 mcg/actuation inhaler Take 2 Puffs by inhalation every four (4) hours as needed for Wheezing. Qty: 1 Inhaler, Refills: 0      multivitamin with iron tablet Take 1 Tab by mouth daily. cyanocobalamin (VITAMIN B12) 500 mcg tablet Take 500 mcg by mouth daily. omeprazole (PRILOSEC) 40 mg capsule Take 1 Cap by mouth daily. Qty: 30 Cap, Refills: 2      hyoscyamine SL (LEVSIN/SL) 0.125 mg SL tablet 1 Tab by SubLINGual route every four (4) hours as needed for Cramping. Qty: 30 Tab, Refills: 0      montelukast (SINGULAIR) 10 mg tablet Take 1 Tab by mouth daily. Qty: 90 Tab, Refills: 3    Associated Diagnoses: Uncomplicated asthma, unspecified asthma severity      fluticasone-salmeterol (ADVAIR) 500-50 mcg/dose diskus inhaler Take 1 Puff by inhalation every twelve (12) hours. Qty: 1 Inhaler, Refills: 0    Associated Diagnoses: Chronic obstructive pulmonary disease, unspecified COPD type ([facility] Mild persistent asthma without complication      lidocaine (LIDODERM) 5 % 1 Patch by TransDERmal route as needed. Apply patch to the affected area for 12 hours a day and remove for 12 hours a day. Qty: 30 Each, Refills: 5    Associated Diagnoses: Chronic pain of left knee      tiotropium (SPIRIVA WITH HANDIHALER) 18 mcg inhalation capsule Take 1 Cap by inhalation daily.                NOTIFY [address] OF THE FOLLOWING:   Fever over 101 degrees for 24 hours. Chest pain, shortness of breath, fever, chills, nausea, vomiting, diarrhea, change in mentation, falling, weakness, bleeding. Severe pain or pain not relieved by medications. Or, any other signs or symptoms that you may have questions about.     DISPOSITION:  x  Home With:   OT  PT  HH  RN       Long term SNF/Inpatient Rehab    Independent/assisted living    Hospice    Other:       PATIENT CONDITION AT DISCHARGE:     Functional status    Poor     Deconditioned    x Independent      Cognition   x  Lucid     Forgetful     Dementia      Catheters/lines (plus indication)    Delatorre     PICC     PEG    x None      Code status    x Full code     DNR      PHYSICAL EXAMINATION AT DISCHARGE:   Refer to Progress Note      CHRONIC MEDICAL DIAGNOSES:  Problem List as of 12/27/2017  Date Reviewed: 12/27/2017          Codes Class Noted - Resolved    Morbid obesity ([facility] ICD-10-CM: E66.01  ICD-9-CM: 278.01  12/29/2016 - Present        Sleep apnea ICD-10-CM: G47.30  ICD-9-CM: 780.57  Unknown - Present        Chronic pain of left knee ICD-10-CM: M25.562, G89.29  ICD-9-CM: 719.46, 338.29  5/11/2016 - Present        Morbid obesity with BMI of 60.0-69.9, adult ([facility] ICD-10-CM: E66.01, Z68.44  ICD-9-CM: 278.01, V85.44  4/7/2016 - Present        Obstructive sleep apnea ICD-10-CM: G47.33  ICD-9-CM: 327.23  2/15/2012 - Present        Arthritis ICD-10-CM: M19.90  ICD-9-CM: 716.90  2/15/2012 - Present        Pain, joint, multiple sites ICD-10-CM: M25.50  ICD-9-CM: 719.49  2/15/2012 - Present        COPD (chronic obstructive pulmonary disease) ([facility] ICD-10-CM: J44.9  ICD-9-CM: 496  9/8/2011 - Present        Asthma ICD-10-CM: J45.909  ICD-9-CM: 493.90  9/8/2011 - Present        * (Principal)RESOLVED: TIA (transient ischemic attack) ICD-10-CM: G45.9  ICD-9-CM: 435.9  12/27/2017 - 12/27/2017        RESOLVED: Malpositioned IUD ICD-10-CM: U70.51IC  ICD-9-CM: 996.76  12/3/2012 - 4/7/2016 RESOLVED: Morbid obesity ([facility] ICD-10-CM: E66.01  ICD-9-CM: 278.01  9/8/2011 - 4/7/2016              Greater than 30  minutes were spent with the patient on counseling and coordination of care    Signed:   Justine Anderson MD  12/27/2017  2:42 PM

## 2017-12-27 NOTE — ED NOTES
Patient resting comfortably in stretcher, VSS, respirations unlabored and in no acute distress upon transfer to the floor.

## 2017-12-27 NOTE — PROCEDURES
[facility] [place]  *** FINAL REPORT ***    Name: Agustin Cintron  MRN: [id number]    Outpatient  : 1980  HIS Order #: [id number]  [address] [facility] Visit #: [id number]  Date: 27 Dec 2017    TYPE OF TEST: Cerebrovascular Duplex    REASON FOR TEST  Transient ischemic attacks    Right Carotid:-             Proximal               Mid                 Distal  cm/s  Systolic  Diastolic  Systolic  Diastolic  Systolic  Diastolic  CCA:    496.5      22.0                           150.0      20.0  Bulb:  ECA:    100.0  ICA:    112.0      25.0                            79.0      22.0  ICA/CCA:  0.7       1.3    ICA Stenosis:    Right Vertebral:-  Finding: Antegrade  Sys:       56.0  Pauline:    Right Subclavian:    Left Carotid:-            Proximal                Mid                 Distal  cm/s  Systolic  Diastolic  Systolic  Diastolic  Systolic  Diastolic  CCA:    386.7      27.0                           139.0      21.0  Bulb:  ECA:     83.0  ICA:     91.0      21.0                            95.0      35.0  ICA/CCA:  0.7       1.0    ICA Stenosis:    Left Vertebral:-  Finding: Antegrade  Sys:       67.0  Pauline:    Left Subclavian:    INTERPRETATION/FINDINGS  PROCEDURE:  Color duplex ultrasound imaging of extracranial  cerebrovascular arteries. FINDINGS:       Right:  Internal carotid velocity is within normal limits, there  is no observed narrowing of the flow channel on color Doppler imaging,   and no plaque is visualized on B-mode imaging. The common and  external carotid arteries are patent and without evidence of  hemodynamically significant stenosis. Left:  Internal carotid velocity is within normal limits, there  is no observed narrowing of the flow channel on color Doppler imaging,   and no plaque is visualized on B-mode imaging. The common and  external carotid arteries are patent and without evidence of  hemodynamically significant stenosis.     IMPRESSION:  Consistent with no stenosis of the right internal carotid   and no stenosis of the left internal carotid. Vertebrals are patent  with antegrade flow. ADDITIONAL COMMENTS    I have personally reviewed the data relevant to the interpretation of  this  study.     TECHNOLOGIST: Iqra Pal RVT  Signed: 12/27/2017 08:52 AM    PHYSICIAN: Cliff Simons MD  Signed: 12/28/2017 07:05 AM

## 2017-12-27 NOTE — ROUTINE PROCESS
TRANSFER - OUT REPORT:    Verbal report given to DEMARCUS Zhang(name) on Madhuri Fuel  being transferred to OBS(unit) for routine progression of care       Report consisted of patients Situation, Background, Assessment and   Recommendations(SBAR). Information from the following report(s) SBAR, ED Summary, Intake/Output, MAR and Recent Results was reviewed with the receiving nurse. Lines:   Peripheral IV 12/26/17 Left Antecubital (Active)   Site Assessment Clean, dry, & intact 12/26/2017 10:56 PM   Phlebitis Assessment 0 12/26/2017 10:56 PM   Infiltration Assessment 0 12/26/2017 10:56 PM   Dressing Status Clean, dry, & intact 12/26/2017 10:56 PM   Dressing Type Transparent 12/26/2017 10:56 PM   Hub Color/Line Status Pink;Capped;Flushed;Patent 12/26/2017 10:56 PM   Action Taken Blood drawn 12/26/2017 10:56 PM        Opportunity for questions and clarification was provided.       Patient transported with:  Transport

## 2017-12-27 NOTE — ED TRIAGE NOTES
Patient arrives via EMS from home. EMS reports patient had sudden onset of headache and R sided weakness and aphasia. Patient became more alert en route, BS 99.    Last known well 2130.   R sided weakness noted upon arrival.

## 2017-12-29 LAB
CARDIOLIPIN IGA SER IA-ACNC: <9 APL U/ML (ref 0–11)
CARDIOLIPIN IGG SER IA-ACNC: <9 GPL U/ML (ref 0–14)
CARDIOLIPIN IGM SER IA-ACNC: <9 MPL U/ML (ref 0–12)

## 2017-12-30 LAB
AT III PPP CHRO-ACNC: 106 % (ref 75–135)
B2 GLYCOPROT1 IGG SER-ACNC: <9 GPI IGG UNITS (ref 0–20)
B2 GLYCOPROT1 IGM SER-ACNC: <9 GPI IGM UNITS (ref 0–32)
LA PPP-IMP: NORMAL
PROT C PPP-ACNC: 116 % (ref 73–180)
PROT S ACT/NOR PPP: 72 % (ref 63–140)
PROTHROM ACT/NOR PPP: 101 % (ref 50–154)
SCREEN APTT: 34.2 SEC (ref 0–51.9)
SCREEN DRVVT: 34.9 SEC (ref 0–47)

## 2018-01-02 ENCOUNTER — OFFICE VISIT (OUTPATIENT)
Dept: SURGERY | Age: 38
End: 2018-01-02

## 2018-01-02 VITALS
DIASTOLIC BLOOD PRESSURE: 84 MMHG | HEIGHT: 64 IN | WEIGHT: 283 LBS | RESPIRATION RATE: 20 BRPM | SYSTOLIC BLOOD PRESSURE: 122 MMHG | TEMPERATURE: 97.7 F | OXYGEN SATURATION: 97 % | HEART RATE: 65 BPM | BODY MASS INDEX: 48.32 KG/M2

## 2018-01-02 DIAGNOSIS — G47.33 OBSTRUCTIVE SLEEP APNEA: Primary | ICD-10-CM

## 2018-01-02 NOTE — PROGRESS NOTES
1. Have you been to the ER, urgent care clinic since your last visit? Hospitalized since your last visit? Yes [facility] for TIA    2. Have you seen or consulted any other health care providers outside of the [address] [place] [address] since your last visit? Include any pap smears or colon screening.  No

## 2018-01-03 LAB
COMMENT, 511217: NORMAL
F5 GENE MUT ANL BLD/T: NORMAL

## 2018-01-04 NOTE — PROGRESS NOTES
Subjective:      Kendall Aldana is a 30 y.o. female presents for postop care 12 months following sleeve gastrectomy. Appetite is good. Eating a regular diet without difficulty. Bowel movements are regular. The patient is voiding without difficulty. The patient is not having any pain. She was recently hospitalized for TIA; work-up in progress. No further neurologic symptoms (no on baby ASA); no regurgitation or GERD. Objective:     Visit Vitals    /84 (BP 1 Location: Left arm, BP Patient Position: Sitting)    Pulse 65    Temp 97.7 °F (36.5 °C) (Oral)    Resp 20    Ht 5' 4\" (1.626 m)    Wt 283 lb (128.4 kg)    SpO2 97%    BMI 48.58 kg/m2       General:  alert, cooperative, no distress, appears stated age, morbidly obese   Abdomen: deferred   Incision:   deferred     Assessment:     1 year following sleeve gastrectomy; down 97 lbs. Plan:     1. Continue current medications. 2. Complete Neurology work-up/  3. Bariatric diet. 4. Increase frequency, intensity of exercise regimen.

## 2018-01-04 NOTE — PATIENT INSTRUCTIONS
Learning About CPAP for Sleep Apnea  What is CPAP? CPAP is a small machine that you use at home every night while you sleep. It increases air pressure in your throat to keep your airway open. When you have sleep apnea, this can help you sleep better so you feel much better. CPAP stands for \"continuous positive airway pressure. \"  The CPAP machine will have one of the following:  · A mask that covers your nose and mouth  · Prongs that fit into your nose  · A mask that covers your nose only, the most common type. This type is called NCPAP. The N stands for \"nasal.\"  Why is it done? CPAP is usually the best treatment for obstructive sleep apnea. It is the first treatment choice and the most widely used. Your doctor may suggest CPAP if you have:  · Moderate to severe sleep apnea. · Sleep apnea and coronary artery disease (CAD) or heart failure. How does it help? · CPAP can help you have more normal sleep, so you feel less sleepy and more alert during the daytime. · CPAP may help keep heart failure or other heart problems from getting worse. · CPAP may help lower your blood pressure. · If you use CPAP, your bed partner may also sleep better because you are not snoring or restless. What are the side effects? Some people who use CPAP have:  · A dry or stuffy nose and a sore throat. · Irritated skin on the face. · Sore eyes. · Bloating. If you have any of these problems, work with your doctor to fix them. Here are some things you can try:  · Be sure the mask or nasal prongs fit well. · See if your doctor can adjust the pressure of your CPAP. · If your nose is dry, try a humidifier. · If your nose is runny or stuffy, try decongestant medicine or a steroid nasal spray. Be safe with medicines. Read and follow all instructions on the label. Do not use the medicine longer than the label says. If these things do not help, you might try a different type of machine.  Some machines have air pressure that adjusts on its own. Others have air pressures that are different when you breathe in than when you breathe out. This may reduce discomfort caused by too much pressure in your nose. Where can you learn more? Go to http://juan diego-jean pierre.info/. Enter D154 in the search box to learn more about \"Learning About CPAP for Sleep Apnea. \"  Current as of: May 12, 2017  Content Version: 11.4  © 0656-2484 [employer], [employer]orporated. Care instructions adapted under license by [employer] (which disclaims liability or warranty for this information). If you have questions about a medical condition or this instruction, always ask your healthcare professional. [address] any warranty or liability for your use of this information.

## 2018-02-27 ENCOUNTER — OFFICE VISIT (OUTPATIENT)
Dept: NEUROLOGY | Age: 38
End: 2018-02-27

## 2018-02-27 VITALS
DIASTOLIC BLOOD PRESSURE: 88 MMHG | SYSTOLIC BLOOD PRESSURE: 138 MMHG | OXYGEN SATURATION: 98 % | HEART RATE: 63 BPM | RESPIRATION RATE: 18 BRPM | BODY MASS INDEX: 49.44 KG/M2 | WEIGHT: 288 LBS

## 2018-02-27 DIAGNOSIS — F41.9 ANXIETY DISORDER, UNSPECIFIED TYPE: ICD-10-CM

## 2018-02-27 DIAGNOSIS — R41.3 MEMORY DISTURBANCE: ICD-10-CM

## 2018-02-27 DIAGNOSIS — G45.9 TRANSIENT CEREBRAL ISCHEMIA, UNSPECIFIED TYPE: Primary | ICD-10-CM

## 2018-02-27 RX ORDER — GUAIFENESIN 100 MG/5ML
81 LIQUID (ML) ORAL DAILY
COMMUNITY
End: 2021-10-19

## 2018-02-27 RX ORDER — ATORVASTATIN CALCIUM 20 MG/1
TABLET, FILM COATED ORAL DAILY
COMMUNITY
End: 2021-10-19

## 2018-02-27 RX ORDER — DULOXETIN HYDROCHLORIDE 30 MG/1
30 CAPSULE, DELAYED RELEASE ORAL DAILY
Qty: 30 CAP | Refills: 2 | Status: SHIPPED | OUTPATIENT
Start: 2018-02-27 | End: 2021-10-19

## 2018-02-27 NOTE — PROGRESS NOTES
Neurology Clinic Follow up Note    Patient ID:  Eric Zarate  [id number]  41 y.o.  1980      Ms. Cristian Bass is here for follow up today of TIA       Last Appointment With Me:  12/27/17      Interval History:   She states she has been doing well. No recurrence of stroke sx.  R-HP has resolved. She is compliant with ASA and Lipitor daily. She states she is having some difficulty with forgetfulness recently. She also endorses some anxiety- she is tearful today. Not followed by psychiatry. Thyroid levels recently checked and normal.        PMHx/ PSHx/ FHx/ SHx:  Reviewed and unchanged previous visit. Past Medical History:   Diagnosis Date    Advance directive discussed with patient 04/07/2016    Arthritis     Asthma     [facility]  Chronic obstructive pulmonary disease ([address]     3/16/15 notes from [facility] ER    Headache(784.0)     Hypertension     NO MEDS    Knee pain, acute 11/2013    [facility]    Morbid obesity (HCC)     Nausea & vomiting     Other ill-defined conditions(799.89)     recent ankle fx 11/21/12(not wearing air cast)    Psychiatric disorder     ANXIETY    Sleep apnea          ROS:  Comprehensive review of systems negative except for as noted above. Objective:       Meds:  Current Outpatient Prescriptions   Medication Sig Dispense Refill    aspirin 81 mg chewable tablet Take 81 mg by mouth daily.  OTHER Cholesterol medication 1x a day      atorvastatin (LIPITOR) 20 mg tablet Take  by mouth daily.  BIOTIN PO Take  by mouth.  ergocalciferol (ERGOCALCIFEROL) 50,000 unit capsule Take 1 Cap by mouth every seven (7) days. Indications: VITAMIN D DEFICIENCY (HIGH DOSE THERAPY) 12 Cap 2    albuterol (VENTOLIN HFA) 90 mcg/actuation inhaler Take 2 Puffs by inhalation every four (4) hours as needed for Wheezing. 1 Inhaler 0    multivitamin with iron tablet Take 1 Tab by mouth daily.  cyanocobalamin (VITAMIN B12) 500 mcg tablet Take 500 mcg by mouth daily.       hyoscyamine SL (LEVSIN/SL) 0.125 mg SL tablet 1 Tab by SubLINGual route every four (4) hours as needed for Cramping. 30 Tab 0    montelukast (SINGULAIR) 10 mg tablet Take 1 Tab by mouth daily. 90 Tab 3    fluticasone-salmeterol (ADVAIR) 500-50 mcg/dose diskus inhaler Take 1 Puff by inhalation every twelve (12) hours. 1 Inhaler 0       Exam:  Visit Vitals    /88    Pulse 63    Resp 18    Wt 130.6 kg (288 lb)    SpO2 98%    BMI 49.44 kg/m2     NEUROLOGICAL EXAM:  General: Awake, alert, speech fluent  CN: PERRL, EOMI without nystagmus, VFF to confrontation, facial sensation and strength are normal and symmetric, hearing is intact to finger rub bilaterally, palate and tongue movements are intact and symmetric. Motor: Normal tone, bulk and strength bilaterally. Reflexes: 2/4 and symmetric, plantar stimulation is flexor. Coordination: FNF, NAILA, HTS intact. Sensation: LT intact throughout. Gait: Normal-based and steady. LABS  Results for orders placed or performed during the hospital encounter of 12/26/17   CBC WITH AUTOMATED DIFF   Result Value Ref Range    WBC 8.2 3.6 - 11.0 K/uL    RBC 4.24 3.80 - 5.20 M/uL    HGB 10.1 (L) 11.5 - 16.0 g/dL    HCT 33.3 (L) 35.0 - 47.0 %    MCV 78.5 (L) 80.0 - 99.0 FL    MCH 23.8 (L) 26.0 - 34.0 PG    MCHC 30.3 30.0 - 36.5 g/dL    RDW 14.9 (H) 11.5 - 14.5 %    PLATELET 405 639 - 009 K/uL    NEUTROPHILS 52 32 - 75 %    LYMPHOCYTES 39 12 - 49 %    MONOCYTES 7 5 - 13 %    EOSINOPHILS 1 0 - 7 %    BASOPHILS 1 0 - 1 %    ABS. NEUTROPHILS 4.3 1.8 - 8.0 K/UL    ABS. LYMPHOCYTES 3.2 0.8 - 3.5 K/UL    ABS. MONOCYTES 0.6 0.0 - 1.0 K/UL    ABS. EOSINOPHILS 0.1 0.0 - 0.4 K/UL    ABS.  BASOPHILS 0.0 0.0 - 0.1 K/UL   METABOLIC PANEL, BASIC   Result Value Ref Range    Sodium 140 136 - 145 mmol/L    Potassium 4.3 3.5 - 5.1 mmol/L    Chloride 109 (H) 97 - 108 mmol/L    CO2 24 21 - 32 mmol/L    Anion gap 7 5 - 15 mmol/L    Glucose 89 65 - 100 mg/dL    BUN 12 6 - 20 MG/DL    Creatinine 0. 70 0.55 - 1.02 MG/DL    BUN/Creatinine ratio 17 12 - 20      GFR est AA >60 >60 ml/min/1.73m2    GFR est non-AA >60 >60 ml/min/1.73m2    Calcium 8.3 (L) 8.5 - 10.1 MG/DL   HEPATIC FUNCTION PANEL   Result Value Ref Range    Protein, total 7.7 6.4 - 8.2 g/dL    Albumin 3.3 (L) 3.5 - 5.0 g/dL    Globulin 4.4 (H) 2.0 - 4.0 g/dL    A-G Ratio 0.8 (L) 1.1 - 2.2      Bilirubin, total 0.2 0.2 - 1.0 MG/DL    Bilirubin, direct <0.1 0.0 - 0.2 MG/DL    Alk. phosphatase 103 45 - 117 U/L    AST (SGOT) 19 15 - 37 U/L    ALT (SGPT) 18 12 - 78 U/L   TROPONIN I   Result Value Ref Range    Troponin-I, Qt. <0.04 <0.05 ng/mL   NT-PRO BNP   Result Value Ref Range    NT pro- (H) 0 - 941 PG/ML   METABOLIC PANEL, COMPREHENSIVE   Result Value Ref Range    Sodium 143 136 - 145 mmol/L    Potassium 3.9 3.5 - 5.1 mmol/L    Chloride 111 (H) 97 - 108 mmol/L    CO2 25 21 - 32 mmol/L    Anion gap 7 5 - 15 mmol/L    Glucose 92 65 - 100 mg/dL    BUN 12 6 - 20 MG/DL    Creatinine 0.58 0.55 - 1.02 MG/DL    BUN/Creatinine ratio 21 (H) 12 - 20      GFR est AA >60 >60 ml/min/1.73m2    GFR est non-AA >60 >60 ml/min/1.73m2    Calcium 8.2 (L) 8.5 - 10.1 MG/DL    Bilirubin, total 0.2 0.2 - 1.0 MG/DL    ALT (SGPT) 14 12 - 78 U/L    AST (SGOT) 11 (L) 15 - 37 U/L    Alk.  phosphatase 87 45 - 117 U/L    Protein, total 6.4 6.4 - 8.2 g/dL    Albumin 2.8 (L) 3.5 - 5.0 g/dL    Globulin 3.6 2.0 - 4.0 g/dL    A-G Ratio 0.8 (L) 1.1 - 2.2     LIPID PANEL   Result Value Ref Range    LIPID PROFILE          Cholesterol, total 144 <200 MG/DL    Triglyceride 61 <150 MG/DL    HDL Cholesterol 46 MG/DL    LDL, calculated 85.8 0 - 100 MG/DL    VLDL, calculated 12.2 MG/DL    CHOL/HDL Ratio 3.1 0 - 5.0     CBC WITH AUTOMATED DIFF   Result Value Ref Range    WBC 6.6 3.6 - 11.0 K/uL    RBC 3.65 (L) 3.80 - 5.20 M/uL    HGB 8.8 (L) 11.5 - 16.0 g/dL    HCT 28.6 (L) 35.0 - 47.0 %    MCV 78.4 (L) 80.0 - 99.0 FL    MCH 24.1 (L) 26.0 - 34.0 PG    MCHC 30.8 30.0 - 36.5 g/dL    RDW 14.9 (H) 11.5 - 14.5 %    PLATELET 646 573 - 244 K/uL    NEUTROPHILS 54 32 - 75 %    LYMPHOCYTES 36 12 - 49 %    MONOCYTES 9 5 - 13 %    EOSINOPHILS 1 0 - 7 %    BASOPHILS 0 0 - 1 %    ABS. NEUTROPHILS 3.6 1.8 - 8.0 K/UL    ABS. LYMPHOCYTES 2.3 0.8 - 3.5 K/UL    ABS. MONOCYTES 0.6 0.0 - 1.0 K/UL    ABS. EOSINOPHILS 0.0 0.0 - 0.4 K/UL    ABS.  BASOPHILS 0.0 0.0 - 0.1 K/UL   PHOSPHORUS   Result Value Ref Range    Phosphorus 3.6 2.6 - 4.7 MG/DL   MAGNESIUM   Result Value Ref Range    Magnesium 1.9 1.6 - 2.4 mg/dL   CK W/ CKMB & INDEX   Result Value Ref Range    CK 85 26 - 192 U/L    CK - MB <1.0 <3.6 NG/ML    CK-MB Index Cannot be calculated 0 - 2.5     TSH 3RD GENERATION   Result Value Ref Range    TSH 2.00 0.36 - 3.74 uIU/mL   HEMOGLOBIN A1C WITH EAG   Result Value Ref Range    Hemoglobin A1c 4.7 4.2 - 6.3 %    Est. average glucose Cannot be calculated mg/dL   TROPONIN I   Result Value Ref Range    Troponin-I, Qt. <0.04 <0.05 ng/mL   PROTEIN S, FUNCTIONAL   Result Value Ref Range    Protein S-Functional 72 63 - 140 %   PROTEIN C ACTIVITY   Result Value Ref Range    Protein C-Functional 116 73 - 180 %   FACTOR V LEIDEN   Result Value Ref Range    Factor V Leiden Comment      Comment Comment     FACTOR VIII   Result Value Ref Range    Factor VIII 152 80 - 200 %   FACTOR II ACTIVITY   Result Value Ref Range    Factor II Activity 101 50 - 154 %   LUPUS ANTICOAGULANT PANEL W/ REFLEX   Result Value Ref Range    PTT-LA 34.2 0.0 - 51.9 sec    dRVVT 34.9 0.0 - 47.0 sec    Interpretation Comment:    CARDIOLIPIN AB PANEL   Result Value Ref Range    Cardiolipin Ab, IgG <9 0 - 14 GPL U/mL    Cardiolipin Ab, IgM <9 0 - 12 MPL U/mL    Cardiolipin Ab, IgA <9 0 - 11 APL U/mL   B-2 GLYCOPROTEIN AB, IGG/IGM   Result Value Ref Range    Beta-2 Glycoprotein I Ab, IgG <9 0 - 20 GPI IgG units    Beta-2 Glycoprotein I Ab, IgM <9 0 - 32 GPI IgM units   ANTITHROMBIN III ACTIVITY   Result Value Ref Range    Antithrombin Activity 106 75 - 135 %   POC INR   Result Value Ref Range    INR (POC) 1.2 (H) <1.2     GLUCOSE, POC   Result Value Ref Range    Glucose (POC) 81 65 - 100 mg/dL    Performed by Juani Owusu (PCT)    EKG, 12 LEAD, INITIAL   Result Value Ref Range    Ventricular Rate 58 BPM    Atrial Rate 58 BPM    P-R Interval 142 ms    QRS Duration 80 ms    Q-T Interval 392 ms    QTC Calculation (Bezet) 384 ms    Calculated P Axis -11 degrees    Calculated R Axis 40 degrees    Calculated T Axis 9 degrees    Diagnosis       Sinus bradycardia  When compared with ECG of 08-DEC-2016 10:55,  No significant change was found  Confirmed by Heather Dodson M.D., Aretha Riedel ([id number]) on 12/27/2017 8:45:57 AM         IMAGING:  MRI Results (most recent):    Results from Hospital Encounter encounter on 12/26/17   MRA BRAIN WO CONT   Narrative INDICATION: TIA    COMPARISON:  None    TECHNIQUE:  3-D time-of-flight MRA of the brain was performed. Multiplanar  reconstructions were obtained. FINDINGS:    Images are partially degraded by motion artifact, particularly at the skull  base. The vertebral arteries are codominant. The basilar artery and its branches  are normal. The internal carotid, anterior cerebral, and middle cerebral  arteries are patent. There is no flow-limiting intracranial stenosis. There is  no aneurysm. There are no sizable posterior communicating arteries. Impression IMPRESSION:  No flow limiting stenosis or intracranial aneurysm. Assessment:     Encounter Diagnoses     ICD-10-CM ICD-9-CM   1. Transient cerebral ischemia, unspecified type G45.9 435.9   2. Anxiety disorder, unspecified type F41.9 300.00   3. Memory disturbance R41.3 66.93     30year old AAF with a h/o HTN, [place], COPD, anxiety, gastric sleeve, miscarriage here for hospital f/u regarding recent pre-syncopal episode 12/2017 followed by acute right hemiparesis, headache. No significant headache/migraine history per pt.   TIA/stroke w/u completed with normal brain MRI and patent vasculature. Hypercoagulable w/u negative. She is maintained on ASA and statin therapy due to dx of TIA. No recurrence of stroke sx since discharge, however she is exhibiting some depression/anxiety during the interview today and endorses difficulty with her memory of recent onset. Suspect comorbid mood disorders are contributing. Discussed further evaluation with neuropsychologic testing. Will also start a trial of Cymbalta to address her depression/anxiety. May require psychiatry referral moving forward if no improvement. Plan:   Neuropsychologic testing  Start Cymbalta 30mg daily  Cont. ASA 81mg and statin therapy for stroke prevention  Goal SBP<140, LDL<70    Follow-up Disposition:  Return in about 3 months (around 5/27/2018).           Signed:  Elias Cid DO  2/27/2018  8:47 AM

## 2018-02-27 NOTE — MR AVS SNAPSHOT
[facility] [address] [place] [address] 
[phone] Patient: Emily Schroeder MRN: [id number] EJB:[id number] Visit Information Date & Time Provider Department Dept. Phone Encounter #  
 2/27/2018  8:40 AM Nam Starks DO [facility] at [address] [id number] Follow-up Instructions Return in about 3 months (around 5/27/2018). Upcoming Health Maintenance Date Due DTaP/Tdap/Td series (1 - Tdap) 7/21/2003 PAP AKA CERVICAL CYTOLOGY 6/1/2014 Influenza Age 5 to Adult 8/1/2017 Allergies as of 2/27/2018  Review Complete On: 2/27/2018 By: Nam Starks DO No Known Allergies Current Immunizations  Reviewed on 11/6/2013 Name Date Pneumococcal Vaccine (Unspecified Type) 2/1/2011 Not reviewed this visit You Were Diagnosed With   
  
 Codes Comments Transient cerebral ischemia, unspecified type    -  Primary ICD-10-CM: G45.9 ICD-9-CM: 435.9 Anxiety disorder, unspecified type     ICD-10-CM: F41.9 ICD-9-CM: 300.00 Memory disturbance     ICD-10-CM: R41.3 ICD-9-CM: 780.93 Vitals BP Pulse Resp Weight(growth percentile) SpO2 BMI  
 138/88 63 18 288 lb (130.6 kg) 98% 49.44 kg/m2 OB Status Smoking Status Having regular periods Never Smoker Vitals History BMI and BSA Data Body Mass Index Body Surface Area  
 49.44 kg/m 2 2.43 m 2 Preferred Pharmacy Pharmacy Name Phone [address] [phone] Your Updated Medication List  
  
   
This list is accurate as of 2/27/18  9:00 AM.  Always use your most recent med list.  
  
  
  
  
 albuterol 90 mcg/actuation inhaler Commonly known as:  VENTOLIN HFA Take 2 Puffs by inhalation every four (4) hours as needed for Wheezing. aspirin 81 mg chewable tablet Take 81 mg by mouth daily. BIOTIN PO Take  by mouth.  
  
 cyanocobalamin 500 mcg tablet Commonly known as:  VITAMIN B12 Take 500 mcg by mouth daily. DULoxetine 30 mg capsule Commonly known as:  CYMBALTA Take 1 Cap by mouth daily. ergocalciferol 50,000 unit capsule Commonly known as:  ERGOCALCIFEROL Take 1 Cap by mouth every seven (7) days. Indications: VITAMIN D DEFICIENCY (HIGH DOSE THERAPY)  
  
 fluticasone-salmeterol 500-50 mcg/dose diskus inhaler Commonly known as:  ADVAIR Take 1 Puff by inhalation every twelve (12) hours. hyoscyamine SL 0.125 mg SL tablet Commonly known as:  LEVSIN/SL  
1 Tab by SubLINGual route every four (4) hours as needed for Cramping.  
  
 lidocaine 5 % Commonly known as:  LIDODERM  
1 Patch by TransDERmal route as needed. Apply patch to the affected area for 12 hours a day and remove for 12 hours a day. LIPITOR 20 mg tablet Generic drug:  atorvastatin Take  by mouth daily. montelukast 10 mg tablet Commonly known as:  SINGULAIR Take 1 Tab by mouth daily. multivitamin with iron tablet Take 1 Tab by mouth daily. omeprazole 40 mg capsule Commonly known as:  PRILOSEC Take 1 Cap by mouth daily. OTHER Cholesterol medication 1x a day SPIRIVA WITH HANDIHALER 18 mcg inhalation capsule Generic drug:  tiotropium Take 1 Cap by inhalation daily. Prescriptions Sent to Pharmacy Refills DULoxetine (CYMBALTA) 30 mg capsule 2 Sig: Take 1 Cap by mouth daily. Class: Normal  
 Pharmacy: [facility] [address] [place] [address] #: [phone] Route: Oral  
  
We Performed the Following REFERRAL TO PSYCHOLOGY [EJP04 Custom] Follow-up Instructions Return in about 3 months (around 5/27/2018). Referral Information Referral ID Referred By Referred To [id number] [address]   
   [address], [address] Phone: [phone] Fax: [phone] Visits Status Start Date End Date 1 New Request 2/27/18 2/27/19 If your referral has a status of pending review or denied, additional information will be sent to support the outcome of this decision. Patient Instructions A Healthy Lifestyle: Care Instructions Your Care Instructions A healthy lifestyle can help you feel good, stay at a healthy weight, and have plenty of energy for both work and play. A healthy lifestyle is something you can share with your whole family. A healthy lifestyle also can lower your risk for serious health problems, such as high blood pressure, heart disease, and diabetes. You can follow a few steps listed below to improve your health and the health of your family. Follow-up care is a key part of your treatment and safety. Be sure to make and go to all appointments, and call your doctor if you are having problems. It's also a good idea to know your test results and keep a list of the medicines you take. How can you care for yourself at home? · Do not eat too much sugar, fat, or fast foods. You can still have dessert and treats now and then. The goal is moderation. · Start small to improve your eating habits. Pay attention to portion sizes, drink less juice and soda pop, and eat more fruits and vegetables. ¨ Eat a healthy amount of food. A 3-ounce serving of meat, for example, is about the size of a deck of cards. Fill the rest of your plate with vegetables and whole grains. ¨ Limit the amount of soda and sports drinks you have every day. Drink more water when you are thirsty. ¨ Eat at least 5 servings of fruits and vegetables every day.  It may seem like a lot, but it is not hard to reach this goal. A serving or helping is 1 piece of fruit, 1 cup of vegetables, or 2 cups of leafy, raw vegetables. Have an apple or some carrot sticks as an afternoon snack instead of a candy bar. Try to have fruits and/or vegetables at every meal. 
· Make exercise part of your daily routine. You may want to start with simple activities, such as walking, bicycling, or slow swimming. Try to be active 30 to 60 minutes every day. You do not need to do all 30 to 60 minutes all at once. For example, you can exercise 3 times a day for 10 or 20 minutes. Moderate exercise is safe for most people, but it is always a good idea to talk to your doctor before starting an exercise program. 
· Keep moving. Priscilla Sidhuge the lawn, work in the garden, or [employer]. Take the stairs instead of the elevator at work. · If you smoke, quit. People who smoke have an increased risk for heart attack, stroke, cancer, and other lung illnesses. Quitting is hard, but there are ways to boost your chance of quitting tobacco for good. ¨ Use nicotine gum, patches, or lozenges. ¨ Ask your doctor about stop-smoking programs and medicines. ¨ Keep trying. In addition to reducing your risk of diseases in the future, you will notice some benefits soon after you stop using tobacco. If you have shortness of breath or asthma symptoms, they will likely get better within a few weeks after you quit. · Limit how much alcohol you drink. Moderate amounts of alcohol (up to 2 drinks a day for men, 1 drink a day for women) are okay. But drinking too much can lead to liver problems, high blood pressure, and other health problems. Family health If you have a family, there are many things you can do together to improve your health. · Eat meals together as a family as often as possible. · Eat healthy foods. This includes fruits, vegetables, lean meats and dairy, and whole grains. · Include your family in your fitness plan.  Most people think of activities such as jogging or tennis as the way to fitness, but there are many ways you and your family can be more active. Anything that makes you breathe hard and gets your heart pumping is exercise. Here are some tips: 
¨ Walk to do errands or to take your child to school or the bus. ¨ Go for a family bike ride after dinner instead of watching TV. Where can you learn more? Go to http://juan diego-jean pierre.info/. Enter B467 in the search box to learn more about \"A Healthy Lifestyle: Care Instructions. \" Current as of: May 12, 2017 Content Version: 11.4 © 8572-6732 [employer]. Care instructions adapted under license by [employer] (which disclaims liability or warranty for this information). If you have questions about a medical condition or this instruction, always ask your healthcare professional. [place] [address] any warranty or liability for your use of this information. Introducing [facility] & HEALTH SERVICES! [facility] introduces "[employer] patient portal. Now you can access parts of your medical record, email your doctor's office, and request medication refills online. 1. In your internet browser, go to [email or web address] 2. Click on the First Time User? Click Here link in the Sign In box. You will see the New Member Sign Up page. 3. Enter your "[employer] Access Code exactly as it appears below. You will not need to use this code after youve completed the sign-up process. If you do not sign up before the expiration date, you must request a new code. · "[employer] Access Code: [email or web address] Expires: 3/26/2018 11:25 PM 
 
4. Enter the last four digits of your Social Security Number (xxxx) and Date of Birth (mm/dd/yyyy) as indicated and click Submit. You will be taken to the next sign-up page. 5. Create a "[employer] ID.  This will be your "[employer] login ID and cannot be changed, so think of one that is secure and easy to remember. 6. Create a [employer] password. You can change your password at any time. 7. Enter your Password Reset Question and Answer. This can be used at a later time if you forget your password. 8. Enter your e-mail address. You will receive e-mail notification when new information is available in [address] 9. Click Sign Up. You can now view and download portions of your medical record. 10. Click the Download Summary menu link to download a portable copy of your medical information. If you have questions, please visit the Frequently Asked Questions section of the [employer] website. Remember, [employer] is NOT to be used for urgent needs. For medical emergencies, dial 911. Now available from your iPhone and Android! Please provide this summary of care documentation to your next provider. Your primary care clinician is listed as [id number] If you have any questions after today's visit, please call [phone].

## 2018-02-27 NOTE — PATIENT INSTRUCTIONS

## 2018-02-28 ENCOUNTER — DOCUMENTATION ONLY (OUTPATIENT)
Dept: NEUROLOGY | Age: 38
End: 2018-02-28

## 2018-04-07 RX ORDER — ERGOCALCIFEROL 1.25 MG/1
CAPSULE ORAL
Qty: 12 CAP | Refills: 0 | Status: SHIPPED | OUTPATIENT
Start: 2018-04-07 | End: 2021-10-19

## 2018-07-06 ENCOUNTER — HOSPITAL ENCOUNTER (EMERGENCY)
Age: 38
Discharge: HOME | End: 2018-07-06
Attending: EMERGENCY MEDICINE
Payer: MEDICAID

## 2018-07-06 VITALS
OXYGEN SATURATION: 99 % | RESPIRATION RATE: 16 BRPM | DIASTOLIC BLOOD PRESSURE: 80 MMHG | HEART RATE: 101 BPM | SYSTOLIC BLOOD PRESSURE: 129 MMHG | BODY MASS INDEX: 49 KG/M2 | WEIGHT: 287 LBS | HEIGHT: 64 IN | TEMPERATURE: 99 F

## 2018-07-06 DIAGNOSIS — R21 RASH AND OTHER NONSPECIFIC SKIN ERUPTION: Primary | ICD-10-CM

## 2018-07-06 PROCEDURE — 99282 EMERGENCY DEPT VISIT SF MDM: CPT

## 2018-07-06 RX ORDER — DIPHENHYDRAMINE HCL 25 MG
50 CAPSULE ORAL
Qty: 20 CAP | Refills: 0 | Status: SHIPPED | OUTPATIENT
Start: 2018-07-06 | End: 2018-07-16

## 2018-07-06 RX ORDER — TRIAMCINOLONE ACETONIDE 1 MG/ML
LOTION TOPICAL 3 TIMES DAILY
Qty: 60 ML | Refills: 0 | Status: SHIPPED | OUTPATIENT
Start: 2018-07-06 | End: 2022-02-25

## 2018-07-06 RX ORDER — CEPHALEXIN 500 MG/1
500 CAPSULE ORAL 4 TIMES DAILY
Qty: 28 CAP | Refills: 0 | Status: SHIPPED | OUTPATIENT
Start: 2018-07-06 | End: 2018-07-13

## 2018-07-06 NOTE — ED NOTES
.Patient given copy of dc instructions and  script(s) by provider. Patient alert and oriented and in no acute distress. Patient ambulatory out of ED.

## 2018-07-06 NOTE — DISCHARGE INSTRUCTIONS
Rash: Care Instructions  Your Care Instructions  A rash is any irritation or inflammation of the skin. Rashes have many possible causes, including allergy, infection, illness, heat, and emotional stress. Follow-up care is a key part of your treatment and safety. Be sure to make and go to all appointments, and call your doctor if you are having problems. It's also a good idea to know your test results and keep a list of the medicines you take. How can you care for yourself at home? · Wash the area with water only. Soap can make dryness and itching worse. Pat dry. · Put cold, wet cloths on the rash to reduce itching. · Keep cool, and stay out of the sun. · Leave the rash open to the air as much of the time as possible. · Sometimes petroleum jelly (Vaseline) can help relieve the discomfort caused by a rash. A moisturizing lotion, such as Cetaphil, also may help. Calamine lotion may help for rashes caused by contact with something (such as a plant or soap) that irritated the skin. Use it 3 or 4 times a day. · If your doctor prescribed a cream, use it as directed. If your doctor prescribed medicine, take it exactly as directed. · If your rash itches so badly that it interferes with your normal activities, take an over-the-counter antihistamine, such as diphenhydramine (Benadryl) or loratadine (Claritin). Read and follow all instructions on the label. When should you call for help? Call your doctor now or seek immediate medical care if:  ? · You have signs of infection, such as:  ¨ Increased pain, swelling, warmth, or redness. ¨ Red streaks leading from the area. ¨ Pus draining from the area. ¨ A fever. ? · You have joint pain along with the rash. ? Watch closely for changes in your health, and be sure to contact your doctor if:  ? · Your rash is changing or getting worse. For example, call if you have pain along with the rash, the rash is spreading, or you have new blisters.    ? · You do not get better after 1 week. Where can you learn more? Go to http://juan diego-jean pierre.info/. Enter U664 in the search box to learn more about \"Rash: Care Instructions. \"  Current as of: October 13, 2016  Content Version: 11.4  © 9256-4007 [employer]. Care instructions adapted under license by [employer] (which disclaims liability or warranty for this information). If you have questions about a medical condition or this instruction, always ask your healthcare professional. [address] any warranty or liability for your use of this information.

## 2018-07-06 NOTE — ED NOTES
Pt. Presents to ED w/ c/o redness and swelling to upper mid back started yesterday. Pt.does not appear in distress. Pt. Ambulatory into ED. Emergency Department Nursing Plan of Care       The Nursing Plan of Care is developed from the Nursing assessment and Emergency Department Attending provider initial evaluation. The plan of care may be reviewed in the ED Provider note.     The Plan of Care was developed with the following considerations:   Patient / Family readiness to learn indicated by:successful return demonstration  Persons(s) to be included in education: patient  Barriers to Learning/Limitations:No    Signed     Olimpia Gomez    7/6/2018   3:36 PM

## 2018-07-06 NOTE — ED PROVIDER NOTES
EMERGENCY DEPARTMENT HISTORY AND PHYSICAL EXAM    Date: 7/6/2018  Patient Name: Rossi Domingo    History of Presenting Illness     Chief Complaint   Patient presents with    Abscess     to upper back starting yesterday         History Provided By: Patient      HPI: Rossi Domingo is a 30 y.o. female with a PMH of CVA, asthma, obesity, COPD, HTN, arthritis, anxiety who presents with acute mild pruritic and aching red rash to Lt upper back x 2 days. NKI. Believes something bit her. Denies any new exposures, chemicals, detergents, medications, foods, insects visualized. No modifying factors. Denies fever, chlls, n/v, numbness/tingling. PCP: Donna Castro MD    Current Outpatient Prescriptions   Medication Sig Dispense Refill    cephALEXin (KEFLEX) 500 mg capsule Take 1 Cap by mouth four (4) times daily for 7 days. 28 Cap 0    diphenhydrAMINE (BENADRYL) 25 mg capsule Take 2 Caps by mouth every six (6) hours as needed for up to 10 days. 20 Cap 0    triamcinolone (KENALOG) 0.1 % lotion Apply  to affected area three (3) times daily. use thin layer 60 mL 0    BIOTIN PO Take  by mouth.  albuterol (VENTOLIN HFA) 90 mcg/actuation inhaler Take 2 Puffs by inhalation every four (4) hours as needed for Wheezing. 1 Inhaler 0    multivitamin with iron tablet Take 1 Tab by mouth daily.  cyanocobalamin (VITAMIN B12) 500 mcg tablet Take 500 mcg by mouth daily.  montelukast (SINGULAIR) 10 mg tablet Take 1 Tab by mouth daily. 90 Tab 3    fluticasone-salmeterol (ADVAIR) 500-50 mcg/dose diskus inhaler Take 1 Puff by inhalation every twelve (12) hours. 1 Inhaler 0    ergocalciferol (ERGOCALCIFEROL) 50,000 unit capsule TAKE 1 CAPSULE BY MOUTH EVERY 7 DAYS 12 Cap 0    aspirin 81 mg chewable tablet Take 81 mg by mouth daily.  OTHER Cholesterol medication 1x a day      atorvastatin (LIPITOR) 20 mg tablet Take  by mouth daily.  DULoxetine (CYMBALTA) 30 mg capsule Take 1 Cap by mouth daily.  30 Cap 2    hyoscyamine SL (LEVSIN/SL) 0.125 mg SL tablet 1 Tab by SubLINGual route every four (4) hours as needed for Cramping. 30 Tab 0       Past History     Past Medical History:  Past Medical History:   Diagnosis Date    Advance directive discussed with patient 04/07/2016    Arthritis     Asthma     [facility]  Chronic obstructive pulmonary disease ([facility] [address]     3/16/15 notes from [facility]    Headache(784.0)     Hypertension     NO MEDS    Knee pain, acute 11/2013    [facility]    Morbid obesity (HCC)     Nausea & vomiting     Other ill-defined conditions(799.89)     recent ankle fx 11/21/12(not wearing air cast)    Psychiatric disorder     ANXIETY    Sleep apnea        Past Surgical History:  Past Surgical History:   Procedure Laterality Date    HX GASTRECTOMY  12/29/2016    lap sleeve gastrectomy by Dr Sam Blair  2008    c section    HX GYN  t-14    d and c post miscarriage    HX GYN  12/5/12    HYSTEROSCOPIC REMOVAL OF IUD    HX KNEE ARTHROSCOPY      L KNEE    HX ORTHOPAEDIC      right hand    HX ORTHOPAEDIC      right tendon repair at age 11-16       Family History:  Family History   Problem Relation Age of Onset    Diabetes Mother     Hypertension Mother    [facility] Elevated Lipids Mother     Asthma Sister     Lung Disease Maternal Grandmother     Anesth Problems Neg Hx        Social History:  Social History   Substance Use Topics    Smoking status: Never Smoker    Smokeless tobacco: Never Used    Alcohol use No       Allergies:  No Known Allergies      Review of Systems   Review of Systems   Constitutional: Negative. Negative for activity change, chills, fatigue and fever. HENT: Negative. Eyes: Negative. Negative for pain. Respiratory: Negative. Negative for cough and shortness of breath. Cardiovascular: Negative. Negative for chest pain. Gastrointestinal: Negative. Negative for abdominal pain, diarrhea, nausea and vomiting. Genitourinary: Negative.   Negative for difficulty urinating and dysuria. Musculoskeletal: Negative. Negative for arthralgias and joint swelling. Skin: Positive for rash. Negative for wound. Neurological: Negative. Negative for headaches. Psychiatric/Behavioral: Negative. Physical Exam     Vitals:    07/06/18 1516   BP: 129/80   Pulse: (!) 101   Resp: 16   Temp: 99 °F (37.2 °C)   SpO2: 99%   Weight: 130.2 kg (287 lb)   Height: 5' 4\" (1.626 m)     Physical Exam   Constitutional: She is oriented to person, place, and time. She appears well-developed and well-nourished. No distress. HENT:   Head: Normocephalic and atraumatic. Right Ear: Hearing and external ear normal.   Left Ear: Hearing and external ear normal.   Nose: Nose normal.   Eyes: Conjunctivae and EOM are normal. Pupils are equal, round, and reactive to light. Neck: Normal range of motion. Pulmonary/Chest: Effort normal. No respiratory distress. Musculoskeletal: Normal range of motion. Neurological: She is alert and oriented to person, place, and time. Skin: Skin is warm and dry. Rash noted. Rash is macular. She is not diaphoretic. There is erythema. Erythematous macular confluent 3-4 cm rash to Lt upper back. No papules, pustules, vesicles, wounds, induration, streaking, fluatuance. Psychiatric: She has a normal mood and affect. Her behavior is normal. Judgment and thought content normal.   Nursing note and vitals reviewed. Diagnostic Study Results     Labs -   No results found for this or any previous visit (from the past 12 hour(s)). Radiologic Studies -   No orders to display     CT Results  (Last 48 hours)    None        CXR Results  (Last 48 hours)    None            Medical Decision Making   I am the first provider for this patient. I reviewed the vital signs, available nursing notes, past medical history, past surgical history, family history and social history. Vital Signs-Reviewed the patient's vital signs.     Records Reviewed: Nursing Notes and Old Medical Records    ED Course:     Disposition:    DISCHARGE NOTE:   3:29 PM      Care plan outlined and precautions discussed. Patient has no new complaints, changes, or physical findings. Results of exam were reviewed with the patient. All medications were reviewed with the patient; will d/c home with keflex, triamcinolone, benadryl. All of pt's questions and concerns were addressed. Patient was instructed and agrees to follow up with PCP, as well as to return to the ED upon further deterioration. Patient is ready to go home. Follow-up Information     Follow up With Details Comments [address], [place] Schedule an appointment as soon as possible for a visit in 1 week As needed, If symptoms worsen [address] [place] [address]  [phone]            Current Discharge Medication List      START taking these medications    Details   cephALEXin (KEFLEX) 500 mg capsule Take 1 Cap by mouth four (4) times daily for 7 days. Qty: 28 Cap, Refills: 0      diphenhydrAMINE (BENADRYL) 25 mg capsule Take 2 Caps by mouth every six (6) hours as needed for up to 10 days. Qty: 20 Cap, Refills: 0      triamcinolone (KENALOG) 0.1 % lotion Apply  to affected area three (3) times daily. use thin layer  Qty: 60 mL, Refills: 0         CONTINUE these medications which have NOT CHANGED    Details   BIOTIN PO Take  by mouth. albuterol (VENTOLIN HFA) 90 mcg/actuation inhaler Take 2 Puffs by inhalation every four (4) hours as needed for Wheezing. Qty: 1 Inhaler, Refills: 0      multivitamin with iron tablet Take 1 Tab by mouth daily. cyanocobalamin (VITAMIN B12) 500 mcg tablet Take 500 mcg by mouth daily. montelukast (SINGULAIR) 10 mg tablet Take 1 Tab by mouth daily.   Qty: 90 Tab, Refills: 3    Associated Diagnoses: Uncomplicated asthma, unspecified asthma severity      fluticasone-salmeterol (ADVAIR) 500-50 mcg/dose diskus inhaler Take 1 Puff by inhalation every twelve (12) hours. Qty: 1 Inhaler, Refills: 0    Associated Diagnoses: Chronic obstructive pulmonary disease, unspecified COPD type ([facility] [address] Mild persistent asthma without complication      ergocalciferol (ERGOCALCIFEROL) 50,000 unit capsule TAKE 1 CAPSULE BY MOUTH EVERY 7 DAYS  Qty: 12 Cap, Refills: 0      aspirin 81 mg chewable tablet Take 81 mg by mouth daily. OTHER Cholesterol medication 1x a day      atorvastatin (LIPITOR) 20 mg tablet Take  by mouth daily. DULoxetine (CYMBALTA) 30 mg capsule Take 1 Cap by mouth daily. Qty: 30 Cap, Refills: 2      hyoscyamine SL (LEVSIN/SL) 0.125 mg SL tablet 1 Tab by SubLINGual route every four (4) hours as needed for Cramping. Qty: 30 Tab, Refills: 0             Provider Notes (Medical Decision Making):   DDx: allergic contact derm, insect bite, irritant derm, cellulitis, abscess    Procedures:  Procedures        Diagnosis     Clinical Impression:   1.  Rash and other nonspecific skin eruption

## 2019-04-25 ENCOUNTER — OFFICE VISIT (OUTPATIENT)
Dept: SURGERY | Age: 39
End: 2019-04-25

## 2019-04-25 VITALS
HEIGHT: 64 IN | DIASTOLIC BLOOD PRESSURE: 90 MMHG | OXYGEN SATURATION: 99 % | TEMPERATURE: 97.9 F | SYSTOLIC BLOOD PRESSURE: 138 MMHG | HEART RATE: 79 BPM | RESPIRATION RATE: 18 BRPM | BODY MASS INDEX: 48.23 KG/M2 | WEIGHT: 282.5 LBS

## 2019-04-25 DIAGNOSIS — E53.8 VITAMIN B12 DEFICIENCY: ICD-10-CM

## 2019-04-25 DIAGNOSIS — Z98.84 S/P LAPAROSCOPIC SLEEVE GASTRECTOMY: ICD-10-CM

## 2019-04-25 DIAGNOSIS — B37.2 SKIN CANDIDIASIS: ICD-10-CM

## 2019-04-25 DIAGNOSIS — E55.9 VITAMIN D DEFICIENCY: ICD-10-CM

## 2019-04-25 DIAGNOSIS — E66.01 MORBID OBESITY (HCC): Primary | ICD-10-CM

## 2019-04-25 RX ORDER — NYSTATIN 100000 U/G
CREAM TOPICAL 2 TIMES DAILY
Qty: 15 G | Refills: 1 | Status: SHIPPED | OUTPATIENT
Start: 2019-04-25 | End: 2022-02-25

## 2019-04-25 NOTE — PROGRESS NOTES
HISTORY OF PRESENT ILLNESS  Marylene Shaker is a 41 y.o. female with previous Sleeve gastrectomy surgery 2 years ago. . She has lost a total of 97.5 pounds since surgery. She  Has lost 0.5 lbs since the last ov. Body mass index is 48.49 kg/m². Baldemar Galeanog no nausea and no vomiting . Denies Acid reflux/heartburn. . Drinking  64 ounces of water daily. 40? protein intake daily. + BM's. Pt is walking for exercise. She presents today with a main complaint of itching in her skin folds. She is upset that she has not lost any weight. She eating mainly vegetables and fish. Beef and chicken make her nauseated. She is snacking between meals; chips. Vitamins:  MVI : yes  Calcium : yes  B-Vit 12: yes    Patient has an advanced directive: NO      Ms. Jacobo Crouch has a reminder for a \"due or due soon\" health maintenance. I have asked that she contact her primary care provider for follow-up on this health maintenance. Current Outpatient Medications:     aspirin 81 mg chewable tablet, Take 81 mg by mouth daily. , Disp: , Rfl:     DULoxetine (CYMBALTA) 30 mg capsule, Take 1 Cap by mouth daily. , Disp: 30 Cap, Rfl: 2    BIOTIN PO, Take  by mouth., Disp: , Rfl:     albuterol (VENTOLIN HFA) 90 mcg/actuation inhaler, Take 2 Puffs by inhalation every four (4) hours as needed for Wheezing., Disp: 1 Inhaler, Rfl: 0    multivitamin with iron tablet, Take 1 Tab by mouth daily. , Disp: , Rfl:     cyanocobalamin (VITAMIN B12) 500 mcg tablet, Take 500 mcg by mouth daily. , Disp: , Rfl:     montelukast (SINGULAIR) 10 mg tablet, Take 1 Tab by mouth daily. , Disp: 90 Tab, Rfl: 3    fluticasone-salmeterol (ADVAIR) 500-50 mcg/dose diskus inhaler, Take 1 Puff by inhalation every twelve (12) hours. , Disp: 1 Inhaler, Rfl: 0    triamcinolone (KENALOG) 0.1 % lotion, Apply  to affected area three (3) times daily.  use thin layer, Disp: 60 mL, Rfl: 0    ergocalciferol (ERGOCALCIFEROL) 50,000 unit capsule, TAKE 1 CAPSULE BY MOUTH EVERY 7 DAYS, Disp: 12 Cap, Rfl: 0    OTHER, Cholesterol medication 1x a day, Disp: , Rfl:     atorvastatin (LIPITOR) 20 mg tablet, Take  by mouth daily. , Disp: , Rfl:     hyoscyamine SL (LEVSIN/SL) 0.125 mg SL tablet, 1 Tab by SubLINGual route every four (4) hours as needed for Cramping., Disp: 30 Tab, Rfl: 0      Visit Vitals  /90 (BP 1 Location: Left arm, BP Patient Position: Sitting)   Pulse 79   Temp 97.9 °F (36.6 °C) (Oral)   Resp 18   Ht 5' 4\" (1.626 m)   Wt 282 lb 8 oz (128.1 kg)   SpO2 99%   BMI 48.49 kg/m²     HPI    Review of Systems   Constitutional: Negative for chills and fever. Respiratory: Negative for cough. Cardiovascular: Negative for chest pain. Gastrointestinal: Negative for abdominal pain, heartburn, nausea and vomiting. Neurological: Negative for dizziness and headaches. Physical Exam   Constitutional: She is oriented to person, place, and time. She appears well-developed and well-nourished. Cardiovascular: Normal rate and regular rhythm. Exam reveals no gallop and no friction rub. No murmur heard. Pulmonary/Chest: Effort normal and breath sounds normal.   Abdominal: Soft. Bowel sounds are normal. She exhibits no distension. There is no tenderness. No masses or hernia noted. Neurological: She is alert and oriented to person, place, and time. Skin: Rash (raised red areas in elbow creases, behind knees and under pannus) noted. Psychiatric: She has a normal mood and affect. ASSESSMENT and PLAN  Crystal Thayer is a 41 y.o. female with previous Sleeve gastrectomy surgery 2 years ago. . She has lost a total of 97.5 pounds since surgery. She  Has lost 0.5 lbs since the last ov. Body mass index is 48.49 kg/m². Start Nystatin for yeast in skin folds. Check nutrition labs. Recommend meeting with RD for help with food choices. Advised patient regard to diet that is high-protein, low-fat, low-sugar, limited carbohydrates. Strive for 50-60 grams of protein daily.  If having a snack, foods that are protein or fiber rich. . No eating/drinking together, chew foods well, and portion control. Measure meals. Discussed snacking behavior and to [facility] pay attention to behavioral factor and habits. Drink at least 40-64 ounces of water or non-calorie/non-carbonated beverages daily. Continue vitamin regiment daily. Exercise at least 3 days a week with cardiovascular and strength training. Patient to follow up in 1 year. Advised to call office if any questions/concerns.

## 2019-04-25 NOTE — PROGRESS NOTES
Patient C/O itching in elbow creases, behind knees, between thighs and under belly. Ricky Beckett  Body composition    female  41 y.o. Vitals:    04/25/19 1058   BP: 138/90   Pulse: 79   Resp: 18   Temp: 97.9 °F (36.6 °C)   TempSrc: Oral   SpO2: 99%   Weight: 282 lb 8 oz (128.1 kg)   Height: 5' 4\" (1.626 m)     Body mass index is 48.49 kg/m².     H&P weight on 12/8/16 was 380 pounds  Last office visit weight on 1/2/18 was 283 pounds

## 2019-04-25 NOTE — PATIENT INSTRUCTIONS
Candidiasis: Care Instructions  Your Care Instructions  Candidiasis (say \"jih-tlp-LI-uh-barrington\") is a yeast infection. Yeast normally lives in your body. But it can cause problems if your body's defenses don't work as they should. Some medicines can increase your chance of getting a yeast infection. These include antibiotics, steroids, and cancer drugs. And some diseases like AIDS and diabetes can make you more likely to get yeast infections. There are different types of yeast infections. Maricarmen Lopez is a yeast infection in the mouth. It usually occurs in people with weak immune systems. It causes white patches inside the mouth and throat. Yeast infections of the skin usually occur in skin folds where the skin stays moist. They cause red, oozing patches on your skin. Babies can get these infections under the diaper. People who often wear gloves can get them on their hands. Many women get vaginal yeast infections. They are most common when women take antibiotics. These infections can cause the vagina to itch and burn. They also cause white discharge that looks like cottage cheese. In rare cases, yeast infects the blood. This can cause serious disease. This kind of infection is treated with medicine given through a needle into a vein (IV). After you start treatment, a yeast infection usually goes away quickly. But if your immune system is weak, the infection may come back. Tell your doctor if you get yeast infections often. Follow-up care is a key part of your treatment and safety. Be sure to make and go to all appointments, and call your doctor if you are having problems. It's also a good idea to know your test results and keep a list of the medicines you take. How can you care for yourself at home? · Take your medicines exactly as prescribed. Call your doctor if you think you are having a problem with your medicine. · Use antibiotics only as directed by your doctor. · Eat yogurt with live cultures.  It has bacteria called lactobacillus. It may help prevent some types of yeast infections. · Keep your skin clean and dry. Put powder on moist places. · If you are using a cream or suppository to treat a vaginal yeast infection, don't use condoms or a diaphragm. Use a different type of birth control. · Eat a healthy diet and get regular exercise. This will help keep your immune system strong. When should you call for help? Watch closely for changes in your health, and be sure to contact your doctor if:    · You do not get better as expected. Where can you learn more? Go to http://juan diego-jean pirere.info/. Enter D152 in the search box to learn more about \"Candidiasis: Care Instructions. \"  Current as of: May 14, 2018  Content Version: 11.9  © 8586-9691 [employer]. Care instructions adapted under license by [employer] (which disclaims liability or warranty for this information). If you have questions about a medical condition or this instruction, always ask your healthcare professional. [address] any warranty or liability for your use of this information.

## 2019-07-31 ENCOUNTER — TELEPHONE (OUTPATIENT)
Dept: SURGERY | Age: 39
End: 2019-07-31

## 2019-07-31 NOTE — TELEPHONE ENCOUNTER
Left voicemail for patient at home number and cell number for a return call regarding her appt tomorrow.   Patient is currently scheduled to see Dr. Rob Vasquez, however she should be seen by Maribel Thomas NP.

## 2019-08-01 ENCOUNTER — TELEPHONE (OUTPATIENT)
Dept: SURGERY | Age: 39
End: 2019-08-01

## 2019-08-01 ENCOUNTER — DOCUMENTATION ONLY (OUTPATIENT)
Dept: SURGERY | Age: 39
End: 2019-08-01

## 2019-08-01 NOTE — TELEPHONE ENCOUNTER
Call transferred from [facility]. CARIE crook Spoke to patient regarding the reason for her appt. And she stated, the rash she had on her inner thighs is back. After speaking with Leeanne Cao NP it was explained to the patient that she should see her PCP. Patient expressed understanding. Lizzie Snyder, PSR to cancel appt.

## 2019-09-25 ENCOUNTER — HOSPITAL ENCOUNTER (EMERGENCY)
Age: 39
Discharge: HOME | End: 2019-09-25
Attending: EMERGENCY MEDICINE
Payer: MEDICAID

## 2019-09-25 ENCOUNTER — APPOINTMENT (OUTPATIENT)
Dept: GENERAL RADIOLOGY | Age: 39
End: 2019-09-25
Attending: NURSE PRACTITIONER
Payer: MEDICAID

## 2019-09-25 VITALS
WEIGHT: 287.26 LBS | SYSTOLIC BLOOD PRESSURE: 151 MMHG | HEART RATE: 95 BPM | HEIGHT: 64 IN | BODY MASS INDEX: 49.04 KG/M2 | OXYGEN SATURATION: 100 % | TEMPERATURE: 97.9 F | DIASTOLIC BLOOD PRESSURE: 92 MMHG | RESPIRATION RATE: 18 BRPM

## 2019-09-25 DIAGNOSIS — S91.312A LACERATION OF LEFT FOOT, INITIAL ENCOUNTER: Primary | ICD-10-CM

## 2019-09-25 PROCEDURE — 75810000293 HC SIMP/SUPERF WND  RPR

## 2019-09-25 PROCEDURE — 73630 X-RAY EXAM OF FOOT: CPT

## 2019-09-25 PROCEDURE — 99282 EMERGENCY DEPT VISIT SF MDM: CPT

## 2019-09-25 PROCEDURE — 90471 IMMUNIZATION ADMIN: CPT

## 2019-09-25 PROCEDURE — 74011000250 HC RX REV CODE- 250: Performed by: NURSE PRACTITIONER

## 2019-09-25 PROCEDURE — 90715 TDAP VACCINE 7 YRS/> IM: CPT | Performed by: NURSE PRACTITIONER

## 2019-09-25 PROCEDURE — 74011250636 HC RX REV CODE- 250/636: Performed by: NURSE PRACTITIONER

## 2019-09-25 RX ORDER — CEPHALEXIN 500 MG/1
500 CAPSULE ORAL 4 TIMES DAILY
Qty: 28 CAP | Refills: 0 | Status: SHIPPED | OUTPATIENT
Start: 2019-09-25 | End: 2019-10-02

## 2019-09-25 RX ORDER — LIDOCAINE HYDROCHLORIDE 10 MG/ML
5 INJECTION, SOLUTION EPIDURAL; INFILTRATION; INTRACAUDAL; PERINEURAL ONCE
Status: COMPLETED | OUTPATIENT
Start: 2019-09-25 | End: 2019-09-25

## 2019-09-25 RX ADMIN — LIDOCAINE HYDROCHLORIDE 5 ML: 10 INJECTION, SOLUTION EPIDURAL; INFILTRATION; INTRACAUDAL; PERINEURAL at 10:03

## 2019-09-25 RX ADMIN — TETANUS TOXOID, REDUCED DIPHTHERIA TOXOID AND ACELLULAR PERTUSSIS VACCINE, ADSORBED 0.5 ML: 5; 2.5; 8; 8; 2.5 SUSPENSION INTRAMUSCULAR at 10:12

## 2019-09-25 NOTE — ED NOTES
Pt c/o wound to L foot Pt states she was using scissors last night and placed broken glass and scissors in trash can Pt states she went to take trash out this AM and bag went across top of L foot     Pt alert oriented x 4

## 2019-09-25 NOTE — DISCHARGE INSTRUCTIONS
Patient Education   Patient Education        Cuts Closed With Stitches: Care Instructions  Your Care Instructions  A cut can happen anywhere on your body. The doctor used stitches to close the cut. Using stitches also helps the cut heal and reduces scarring. Sometimes pieces of tape called Steri-Strips are put over the stitches. If the cut went deep and through the skin, the doctor may have put in two layers of stitches. The deeper layer brings the deep part of the cut together. These stitches will dissolve and don't need to be removed. The stitches in the upper layer are the ones you see on the cut. You will probably have a bandage over the stitches. You will need to have the stitches removed, usually in 7 to 14 days. The doctor has checked you carefully, but problems can develop later. If you notice any problems or new symptoms, get medical treatment right away. Follow-up care is a key part of your treatment and safety. Be sure to make and go to all appointments, and call your doctor if you are having problems. It's also a good idea to know your test results and keep a list of the medicines you take. How can you care for yourself at home? · Keep the cut dry for the first 24 to 48 hours. After this, you can shower if your doctor okays it. Pat the cut dry. · Don't soak the cut, such as in a bathtub. Your doctor will tell you when it's safe to get the cut wet. · If your doctor told you how to care for your cut, follow your doctor's instructions. If you did not get instructions, follow this general advice:  ? After the first 24 to 48 hours, wash around the cut with clean water 2 times a day. Don't use hydrogen peroxide or alcohol, which can slow healing. ? You may cover the cut with a thin layer of petroleum jelly, such as Vaseline, and a nonstick bandage. ? Apply more petroleum jelly and replace the bandage as needed. · Prop up the sore area on a pillow anytime you sit or lie down during the next 3 days. Try to keep it above the level of your heart. This will help reduce swelling. · Avoid any activity that could cause your cut to reopen. · Do not remove the stitches on your own. Your doctor will tell you when to come back to have the stitches removed. · Leave Steri-Strips on until they fall off. · Be safe with medicines. Read and follow all instructions on the label. ? If the doctor gave you a prescription medicine for pain, take it as prescribed. ? If you are not taking a prescription pain medicine, ask your doctor if you can take an over-the-counter medicine. When should you call for help? Call your doctor now or seek immediate medical care if:    · You have new pain, or your pain gets worse.     · The skin near the cut is cold or pale or changes color.     · You have tingling, weakness, or numbness near the cut.     · The cut starts to bleed, and blood soaks through the bandage. Oozing small amounts of blood is normal.     · You have trouble moving the area near the cut.     · You have symptoms of infection, such as:  ? Increased pain, swelling, warmth, or redness around the cut.  ? Red streaks leading from the cut.  ? Pus draining from the cut.  ? A fever.    Watch closely for changes in your health, and be sure to contact your doctor if:    · The cut reopens.     · You do not get better as expected. Where can you learn more? Go to http://juan diego-jean pierre.info/. Enter R217 in the search box to learn more about \"Cuts Closed With Stitches: Care Instructions. \"  Current as of: June 26, 2019  Content Version: 12.2  © 1289-5836 [employer]. Care instructions adapted under license by [employer] (which disclaims liability or warranty for this information). If you have questions about a medical condition or this instruction, always ask your healthcare professional. [address] any warranty or liability for your use of this information. Laceration: After Your Visit to the Emergency Room  Your Care Instructions  A laceration, or cut, is an open wound through the skin. The doctor has cleaned your wound. The care you need depends on the type of cut or wound you have. The doctor may have used stitches, staples, tape (Steri-strips), or skin glue to close the wound. This will stop the bleeding, help the wound heal, and reduce scarring. Take good care of your wound at home to help it heal quickly and reduce your chance of infection. While your wound is healing, avoid any activity that could cause your wound to reopen. Even though you have been released from the emergency room, you still need to watch for any problems. The doctor carefully checked you. But sometimes problems can develop later. If you have new symptoms, or if your symptoms do not get better, return to the emergency room or call your doctor right away. A visit to the emergency room is only one step in your treatment. Even if you feel better, you still need to do what your doctor recommends, such as going to all suggested follow-up appointments and taking medicines exactly as directed. This will help you recover and help prevent future problems. How can you care for yourself at home? · Keep the wound dry for the first 48 hours or as your doctor tells you. · Clean the area with soap and water 2 times a day unless your doctor gives you different instructions. Don't use hydrogen peroxide or alcohol, which can slow healing. ¨ You may cover the wound with a thin layer of antibiotic ointment, such as bacitracin, and a nonstick bandage. ¨ Apply more ointment and replace the bandage as needed. · If your doctor prescribed antibiotics, take them as directed. Do not stop taking them just because you feel better. You need to take the full course of antibiotics. · Take pain medicines exactly as directed. ¨ If the doctor gave you a prescription medicine for pain, take it as prescribed.   ¨ If you are not taking a prescription pain medicine, ask your doctor if you can take an over-the-counter medicine. · Some pain is normal with a wound, but do not ignore pain that is getting worse instead of better. You could have an infection. · Your doctor may have closed your wound with stitches (sutures), staples, Steri-strips, or skin glue. ¨ If you have stitches, your doctor may remove them after several days to 2 weeks. ¨ If you have Steri-strips, leave them on for a week, or until they loosen and come off on their own. ¨ If you have staples, your doctor may remove them after 7 to 14 days. ¨ If your wound was closed with skin glue, the glue will wear off in a few days to 2 weeks. When should you call for help? Return to the emergency room now if:  · You have signs of infection, such as:  ¨ Increased pain, swelling, warmth, or redness around the wound. ¨ Red streaks leading from the wound. ¨ Pus draining from the wound. ¨ Swollen lymph nodes in your neck, armpits, or groin. ¨ A fever. · The wound opens or starts to bleed, and blood soaks through the bandage. A small amount of blood is normal.  Call your doctor today if:  · The wound has not been getting better or looks worse. Where can you learn more? Go to IDMission.be  Enter R096 in the search box to learn more about \"Laceration: After Your Visit to the Emergency Room. \"   © 2307-6965 [employer]. Care instructions adapted under license by [facility] (which disclaims liability or warranty for this information). This care instruction is for use with your licensed healthcare professional. If you have questions about a medical condition or this instruction, always ask your healthcare professional. [place] [address] any warranty or liability for your use of this information. Content Version: 9.4.87231;  Last Revised: September 29, 2010

## 2019-09-25 NOTE — ED PROVIDER NOTES
EMERGENCY DEPARTMENT HISTORY AND PHYSICAL EXAM      Date: 9/25/2019  Patient Name: Charlie Whitmore    History of Presenting Illness     Chief Complaint   Patient presents with    Laceration     Ambulatory into the ED with c/o laceration to Lt foot x this am.       History Provided By: Patient    HPI: Charlie Whitmore, 42 y.o. female, presents by POV to the ED with cc of laceration on the left foot. Patient states she was taking out the trash when a piece of glass from the trash bag cut her foot. Patient complained of mild to moderate amount of bleeding during initial incident however, states the bleeding has been controlled upon arrival to the emergency department. Patient came straight from home to the ED with gauze and bandage. There are no other complaints, changes, or physical findings at this time. PCP: Yumiko Devine MD    No current facility-administered medications on file prior to encounter. Current Outpatient Medications on File Prior to Encounter   Medication Sig Dispense Refill    ergocalciferol (ERGOCALCIFEROL) 50,000 unit capsule TAKE 1 CAPSULE BY MOUTH EVERY 7 DAYS 12 Cap 0    BIOTIN PO Take  by mouth.  albuterol (VENTOLIN HFA) 90 mcg/actuation inhaler Take 2 Puffs by inhalation every four (4) hours as needed for Wheezing. 1 Inhaler 0    multivitamin with iron tablet Take 1 Tab by mouth daily.  cyanocobalamin (VITAMIN B12) 500 mcg tablet Take 500 mcg by mouth daily.  montelukast (SINGULAIR) 10 mg tablet Take 1 Tab by mouth daily. 90 Tab 3    fluticasone-salmeterol (ADVAIR) 500-50 mcg/dose diskus inhaler Take 1 Puff by inhalation every twelve (12) hours. 1 Inhaler 0    nystatin (MYCOSTATIN) topical cream Apply  to affected area two (2) times a day. 15 g 1    triamcinolone (KENALOG) 0.1 % lotion Apply  to affected area three (3) times daily. use thin layer 60 mL 0    aspirin 81 mg chewable tablet Take 81 mg by mouth daily.       OTHER Cholesterol medication 1x a day  atorvastatin (LIPITOR) 20 mg tablet Take  by mouth daily.  DULoxetine (CYMBALTA) 30 mg capsule Take 1 Cap by mouth daily. 30 Cap 2    hyoscyamine SL (LEVSIN/SL) 0.125 mg SL tablet 1 Tab by SubLINGual route every four (4) hours as needed for Cramping. 30 Tab 0       Past History     Past Medical History:  Past Medical History:   Diagnosis Date    Advance directive discussed with patient 04/07/2016    Arthritis     Asthma     [facility]  Chronic obstructive pulmonary disease ([facility] [address]     3/16/15 notes from [facility]    Headache(784.0)     Hypertension     NO MEDS    Knee pain, acute 11/2013    [facility]    Morbid obesity (HCC)     Nausea & vomiting     Other ill-defined conditions(799.89)     recent ankle fx 11/21/12(not wearing air cast)    Psychiatric disorder     ANXIETY    Sleep apnea        Past Surgical History:  Past Surgical History:   Procedure Laterality Date    HX GASTRECTOMY  12/29/2016    lap sleeve gastrectomy by Dr Alvin Samano  2008    c section    HX GYN  t-14    d and c post miscarriage    HX GYN  12/5/12    HYSTEROSCOPIC REMOVAL OF IUD    HX KNEE ARTHROSCOPY      L KNEE    HX ORTHOPAEDIC      right hand    HX ORTHOPAEDIC      right tendon repair at age 11-16       Family History:  Family History   Problem Relation Age of Onset    Diabetes Mother     Hypertension Mother    [employer] Elevated Lipids Mother     Asthma Sister     Lung Disease Maternal Grandmother     Anesth Problems Neg Hx        Social History:  Social History     Tobacco Use    Smoking status: Never Smoker    Smokeless tobacco: Never Used   Substance Use Topics    Alcohol use: No    Drug use: No     Types: Prescription, OTC       Allergies:  No Known Allergies      Review of Systems   Review of Systems   Constitutional: Negative for chills and fever. HENT: Negative for congestion, rhinorrhea and sore throat. Respiratory: Negative for cough and shortness of breath.     Cardiovascular: Negative for chest pain. Gastrointestinal: Negative for abdominal pain, nausea and vomiting. Endocrine: Negative for polyuria. Genitourinary: Negative for dysuria and frequency. Musculoskeletal: Negative for arthralgias and myalgias. Skin: Positive for wound. Negative for rash. Neurological: Negative for dizziness, weakness and headaches. All other systems reviewed and are negative. Physical Exam   Physical Exam   Constitutional: She is oriented to person, place, and time. She appears well-developed and well-nourished. No distress. 42 y.o. female   HENT:   Head: Normocephalic and atraumatic. Eyes: Pupils are equal, round, and reactive to light. Conjunctivae are normal. Right eye exhibits no discharge. Left eye exhibits no discharge. Neck: Normal range of motion. Neck supple. No JVD present. No tracheal deviation present. No thyromegaly present. Cardiovascular: Normal rate, regular rhythm and normal heart sounds. No murmur heard. Pulmonary/Chest: Effort normal and breath sounds normal. No respiratory distress. She has no wheezes. Abdominal: Soft. Bowel sounds are normal. She exhibits no distension. There is no tenderness. There is no rebound. Musculoskeletal: Normal range of motion. She exhibits no edema, tenderness or deformity. Neurological: She is alert and oriented to person, place, and time. Skin: Skin is warm and dry. Laceration noted. She is not diaphoretic. Psychiatric: She has a normal mood and affect. Her behavior is normal.   Nursing note and vitals reviewed. Diagnostic Study Results     Labs -   No results found for this or any previous visit (from the past 12 hour(s)). Radiologic Studies -   XR FOOT LT MIN 3 V   Final Result   IMPRESSION:       Subtle soft tissue gas dorsal to the navicular. No fracture or radiodense   foreign body. Medical Decision Making   I am the first provider for this patient.     I reviewed the vital signs, available nursing notes, past medical history, past surgical history, family history and social history. Vital Signs-Reviewed the patient's vital signs. Patient Vitals for the past 12 hrs:   Temp Pulse Resp BP SpO2   09/25/19 0934 97.9 °F (36.6 °C) 95 18 (!) 151/92 100 %       Records Reviewed: Nursing Notes and Old Medical Records    Provider Notes (Medical Decision Making):   Differential diagnosis include foreign body and soft tissue of the foot, laceration, tendon laceration. ED Course:   Initial assessment performed. The patients presenting problems have been discussed, and they are in agreement with the care plan formulated and outlined with them. I have encouraged them to ask questions as they arise throughout their visit. Procedure Note - Laceration Repair:  8215 AM  Procedure by Grace Jorge NP. Complexity: low  1.5 cm linear laceration to foot  was irrigated copiously with NS under jet lavage, prepped with Betadine and draped in a sterile fashion. The area was anesthetized with 5 mLs of  Lidocaine 1% without epinephrine via local infiltration. The wound was explored with the following results: No foreign bodies found. The wound was repaired with One layer suture closure: Skin Layer:  4 sutures placed, stitch type:simple interrupted, suture: 4-0 nylon. .  The wound was closed with good hemostasis and approximation. Sterile dressing applied. Estimated blood loss: less than 1 cc  The procedure took 1-15 minutes, and pt tolerated well. Although patient had high anxiety about receiving stitches and lidocaine injection, patient tolerated procedure well. Discussed negative x-ray findings of no foreign bodies in the foot. Advised patient to follow-up with her primary care doctor in 5 to 7 days for suture removal or to come back to the emergency room if signs and symptoms of infection which include bleeding or drainage through the sutures, redness, swelling or increased pain.   Therefore to decrease incident of infection, patient was advised to begin taking Keflex antibiotic until regimen is complete. Acknowledged and agree. Critical Care Time: None    Disposition:  DISCHARGE NOTE:  10:15 AM  The pt is ready for discharge. The pt's signs, symptoms, diagnosis, and discharge instructions have been discussed and pt has conveyed their understanding. The pt is to follow up as recommended or return to ER should their symptoms worsen. Plan has been discussed and pt is in agreement. Igor Gutierrez NP  9/25/2019      PLAN:  1. Current Discharge Medication List      START taking these medications    Details   cephALEXin (KEFLEX) 500 mg capsule Take 1 Cap by mouth four (4) times daily for 7 days. Qty: 28 Cap, Refills: 0         CONTINUE these medications which have NOT CHANGED    Details   ergocalciferol (ERGOCALCIFEROL) 50,000 unit capsule TAKE 1 CAPSULE BY MOUTH EVERY 7 DAYS  Qty: 12 Cap, Refills: 0      BIOTIN PO Take  by mouth. albuterol (VENTOLIN HFA) 90 mcg/actuation inhaler Take 2 Puffs by inhalation every four (4) hours as needed for Wheezing. Qty: 1 Inhaler, Refills: 0      multivitamin with iron tablet Take 1 Tab by mouth daily. cyanocobalamin (VITAMIN B12) 500 mcg tablet Take 500 mcg by mouth daily. montelukast (SINGULAIR) 10 mg tablet Take 1 Tab by mouth daily. Qty: 90 Tab, Refills: 3    Associated Diagnoses: Uncomplicated asthma, unspecified asthma severity      fluticasone-salmeterol (ADVAIR) 500-50 mcg/dose diskus inhaler Take 1 Puff by inhalation every twelve (12) hours. Qty: 1 Inhaler, Refills: 0    Associated Diagnoses: Chronic obstructive pulmonary disease, unspecified COPD type ([address] Mild persistent asthma without complication      nystatin (MYCOSTATIN) topical cream Apply  to affected area two (2) times a day. Qty: 15 g, Refills: 1      triamcinolone (KENALOG) 0.1 % lotion Apply  to affected area three (3) times daily.  use thin layer  Qty: 60 mL, Refills: 0 aspirin 81 mg chewable tablet Take 81 mg by mouth daily. OTHER Cholesterol medication 1x a day      atorvastatin (LIPITOR) 20 mg tablet Take  by mouth daily. DULoxetine (CYMBALTA) 30 mg capsule Take 1 Cap by mouth daily. Qty: 30 Cap, Refills: 2      hyoscyamine SL (LEVSIN/SL) 0.125 mg SL tablet 1 Tab by SubLINGual route every four (4) hours as needed for Cramping. Qty: 30 Tab, Refills: 0           2. Follow-up Information     Follow up With Specialties Details Why Contact Info    [facility] EMERGENCY DEPT Emergency Medicine Go in 1 week As needed, If symptoms worsen, For suture removal [address] [place] [address]  [phone]    Michael Devine MD Family Practice Go in 1 week As needed, If symptoms worsen, For suture removal [address] [facility]  [address]  [phone]          Return to ED if worse     Diagnosis     Clinical Impression:   1. Laceration of left foot, initial encounter          Please note that this dictation was completed with [employer], the computer voice recognition software. Quite often unanticipated grammatical, syntax, homophones, and other interpretive errors are inadvertently transcribed by the computer software. Please disregards these errors. Please excuse any errors that have escaped final proofreading. This note will not be viewable in [address]

## 2019-09-25 NOTE — ED NOTES
Pt discharged by this RN. Pt provided with discharge instructions Rx and instructions on follow up care. Pt out of ED under own power steady gait accompanied by friend.

## 2019-11-24 ENCOUNTER — HOSPITAL ENCOUNTER (EMERGENCY)
Age: 39
Discharge: HOME | End: 2019-11-24
Attending: EMERGENCY MEDICINE
Payer: MEDICAID

## 2019-11-24 ENCOUNTER — APPOINTMENT (OUTPATIENT)
Dept: CT IMAGING | Age: 39
End: 2019-11-24
Attending: PHYSICIAN ASSISTANT
Payer: MEDICAID

## 2019-11-24 VITALS
TEMPERATURE: 99.9 F | OXYGEN SATURATION: 99 % | HEIGHT: 63 IN | DIASTOLIC BLOOD PRESSURE: 85 MMHG | WEIGHT: 288 LBS | HEART RATE: 98 BPM | BODY MASS INDEX: 51.03 KG/M2 | RESPIRATION RATE: 18 BRPM | SYSTOLIC BLOOD PRESSURE: 132 MMHG

## 2019-11-24 DIAGNOSIS — R51.9 ACUTE NONINTRACTABLE HEADACHE, UNSPECIFIED HEADACHE TYPE: Primary | ICD-10-CM

## 2019-11-24 LAB
ANION GAP SERPL CALC-SCNC: 5 MMOL/L (ref 5–15)
ATRIAL RATE: 96 BPM
BASOPHILS # BLD: 0 K/UL (ref 0–0.1)
BASOPHILS NFR BLD: 1 % (ref 0–1)
BUN SERPL-MCNC: 11 MG/DL (ref 6–20)
BUN/CREAT SERPL: 16 (ref 12–20)
CALCIUM SERPL-MCNC: 9.3 MG/DL (ref 8.5–10.1)
CALCULATED P AXIS, ECG09: 56 DEGREES
CALCULATED R AXIS, ECG10: 42 DEGREES
CALCULATED T AXIS, ECG11: -3 DEGREES
CHLORIDE SERPL-SCNC: 108 MMOL/L (ref 97–108)
CO2 SERPL-SCNC: 23 MMOL/L (ref 21–32)
COMMENT, HOLDF: NORMAL
CREAT SERPL-MCNC: 0.67 MG/DL (ref 0.55–1.02)
DIAGNOSIS, 93000: NORMAL
DIFFERENTIAL METHOD BLD: ABNORMAL
EOSINOPHIL # BLD: 0 K/UL (ref 0–0.4)
EOSINOPHIL NFR BLD: 0 % (ref 0–7)
ERYTHROCYTE [DISTWIDTH] IN BLOOD BY AUTOMATED COUNT: 16.7 % (ref 11.5–14.5)
GLUCOSE SERPL-MCNC: 98 MG/DL (ref 65–100)
HCG UR QL: NEGATIVE
HCT VFR BLD AUTO: 32.4 % (ref 35–47)
HGB BLD-MCNC: 9.4 G/DL (ref 11.5–16)
IMM GRANULOCYTES # BLD AUTO: 0 K/UL (ref 0–0.04)
IMM GRANULOCYTES NFR BLD AUTO: 0 % (ref 0–0.5)
LYMPHOCYTES # BLD: 1.7 K/UL (ref 0.8–3.5)
LYMPHOCYTES NFR BLD: 27 % (ref 12–49)
MCH RBC QN AUTO: 21.1 PG (ref 26–34)
MCHC RBC AUTO-ENTMCNC: 29 G/DL (ref 30–36.5)
MCV RBC AUTO: 72.8 FL (ref 80–99)
MONOCYTES # BLD: 0.4 K/UL (ref 0–1)
MONOCYTES NFR BLD: 6 % (ref 5–13)
NEUTS SEG # BLD: 4.1 K/UL (ref 1.8–8)
NEUTS SEG NFR BLD: 66 % (ref 32–75)
NRBC # BLD: 0 K/UL (ref 0–0.01)
NRBC BLD-RTO: 0 PER 100 WBC
P-R INTERVAL, ECG05: 132 MS
PLATELET # BLD AUTO: 303 K/UL (ref 150–400)
PMV BLD AUTO: 11 FL (ref 8.9–12.9)
POTASSIUM SERPL-SCNC: 3.8 MMOL/L (ref 3.5–5.1)
Q-T INTERVAL, ECG07: 328 MS
QRS DURATION, ECG06: 72 MS
QTC CALCULATION (BEZET), ECG08: 414 MS
RBC # BLD AUTO: 4.45 M/UL (ref 3.8–5.2)
SAMPLES BEING HELD,HOLD: NORMAL
SODIUM SERPL-SCNC: 136 MMOL/L (ref 136–145)
TROPONIN I SERPL-MCNC: <0.05 NG/ML
VENTRICULAR RATE, ECG03: 96 BPM
WBC # BLD AUTO: 6.2 K/UL (ref 3.6–11)

## 2019-11-24 PROCEDURE — 85025 COMPLETE CBC W/AUTO DIFF WBC: CPT

## 2019-11-24 PROCEDURE — 96375 TX/PRO/DX INJ NEW DRUG ADDON: CPT

## 2019-11-24 PROCEDURE — 84484 ASSAY OF TROPONIN QUANT: CPT

## 2019-11-24 PROCEDURE — 36415 COLL VENOUS BLD VENIPUNCTURE: CPT

## 2019-11-24 PROCEDURE — 96374 THER/PROPH/DIAG INJ IV PUSH: CPT

## 2019-11-24 PROCEDURE — 74011000250 HC RX REV CODE- 250: Performed by: EMERGENCY MEDICINE

## 2019-11-24 PROCEDURE — 80048 BASIC METABOLIC PNL TOTAL CA: CPT

## 2019-11-24 PROCEDURE — 99284 EMERGENCY DEPT VISIT MOD MDM: CPT

## 2019-11-24 PROCEDURE — 93005 ELECTROCARDIOGRAM TRACING: CPT

## 2019-11-24 PROCEDURE — 81025 URINE PREGNANCY TEST: CPT

## 2019-11-24 PROCEDURE — 74011250637 HC RX REV CODE- 250/637: Performed by: PHYSICIAN ASSISTANT

## 2019-11-24 PROCEDURE — 74011250636 HC RX REV CODE- 250/636: Performed by: EMERGENCY MEDICINE

## 2019-11-24 PROCEDURE — 70450 CT HEAD/BRAIN W/O DYE: CPT

## 2019-11-24 PROCEDURE — 96376 TX/PRO/DX INJ SAME DRUG ADON: CPT

## 2019-11-24 PROCEDURE — 74011250636 HC RX REV CODE- 250/636: Performed by: PHYSICIAN ASSISTANT

## 2019-11-24 RX ORDER — DEXAMETHASONE SODIUM PHOSPHATE 10 MG/ML
10 INJECTION INTRAMUSCULAR; INTRAVENOUS
Status: COMPLETED | OUTPATIENT
Start: 2019-11-24 | End: 2019-11-24

## 2019-11-24 RX ORDER — BUTALBITAL, ACETAMINOPHEN AND CAFFEINE 300; 40; 50 MG/1; MG/1; MG/1
1 CAPSULE ORAL
Qty: 6 CAP | Refills: 0 | Status: SHIPPED | OUTPATIENT
Start: 2019-11-24 | End: 2021-10-19

## 2019-11-24 RX ORDER — ACETAMINOPHEN 325 MG/1
650 TABLET ORAL
Status: COMPLETED | OUTPATIENT
Start: 2019-11-24 | End: 2019-11-24

## 2019-11-24 RX ORDER — KETOROLAC TROMETHAMINE 10 MG/1
10 TABLET, FILM COATED ORAL
Qty: 12 TAB | Refills: 0 | Status: SHIPPED | OUTPATIENT
Start: 2019-11-24 | End: 2021-10-19

## 2019-11-24 RX ORDER — KETOROLAC TROMETHAMINE 30 MG/ML
15 INJECTION, SOLUTION INTRAMUSCULAR; INTRAVENOUS
Status: COMPLETED | OUTPATIENT
Start: 2019-11-24 | End: 2019-11-24

## 2019-11-24 RX ORDER — DIPHENHYDRAMINE HYDROCHLORIDE 50 MG/ML
25 INJECTION, SOLUTION INTRAMUSCULAR; INTRAVENOUS
Status: COMPLETED | OUTPATIENT
Start: 2019-11-24 | End: 2019-11-24

## 2019-11-24 RX ORDER — BUTALBITAL, ACETAMINOPHEN AND CAFFEINE 50; 325; 40 MG/1; MG/1; MG/1
1 TABLET ORAL
Status: COMPLETED | OUTPATIENT
Start: 2019-11-24 | End: 2019-11-24

## 2019-11-24 RX ADMIN — KETOROLAC TROMETHAMINE 15 MG: 30 INJECTION, SOLUTION INTRAMUSCULAR at 10:04

## 2019-11-24 RX ADMIN — SODIUM CHLORIDE 1000 ML: 900 INJECTION, SOLUTION INTRAVENOUS at 11:08

## 2019-11-24 RX ADMIN — SODIUM CHLORIDE 10 MG: 9 INJECTION INTRAMUSCULAR; INTRAVENOUS; SUBCUTANEOUS at 11:08

## 2019-11-24 RX ADMIN — DEXAMETHASONE SODIUM PHOSPHATE 10 MG: 10 INJECTION INTRAMUSCULAR; INTRAVENOUS at 09:26

## 2019-11-24 RX ADMIN — SODIUM CHLORIDE 1000 ML: 900 INJECTION, SOLUTION INTRAVENOUS at 09:25

## 2019-11-24 RX ADMIN — KETOROLAC TROMETHAMINE 15 MG: 30 INJECTION, SOLUTION INTRAMUSCULAR at 11:08

## 2019-11-24 RX ADMIN — ACETAMINOPHEN 650 MG: 325 TABLET ORAL at 11:07

## 2019-11-24 RX ADMIN — DIPHENHYDRAMINE HYDROCHLORIDE 25 MG: 50 INJECTION, SOLUTION INTRAMUSCULAR; INTRAVENOUS at 09:26

## 2019-11-24 RX ADMIN — SODIUM CHLORIDE 1000 ML: 900 INJECTION, SOLUTION INTRAVENOUS at 11:09

## 2019-11-24 RX ADMIN — BUTALBITAL, ACETAMINOPHEN AND CAFFEINE 1 TABLET: 50; 325; 40 TABLET ORAL at 11:07

## 2019-11-24 NOTE — DISCHARGE INSTRUCTIONS
Patient Education     Return for new or worsening symptoms: Vision changes, numbness, weakness, seizure with headache, neck stiffness. Take medications as directed. Drink plenty of fluids. Headache: Care Instructions  Your Care Instructions    Headaches have many possible causes. Most headaches aren't a sign of a more serious problem, and they will get better on their own. Home treatment may help you feel better faster. The doctor has checked you carefully, but problems can develop later. If you notice any problems or new symptoms, get medical treatment right away. Follow-up care is a key part of your treatment and safety. Be sure to make and go to all appointments, and call your doctor if you are having problems. It's also a good idea to know your test results and keep a list of the medicines you take. How can you care for yourself at home? · Do not drive if you have taken a prescription pain medicine. · Rest in a quiet, dark room until your headache is gone. Close your eyes and try to relax or go to sleep. Don't watch TV or read. · Put a cold, moist cloth or cold pack on the painful area for 10 to 20 minutes at a time. Put a thin cloth between the cold pack and your skin. · Use a warm, moist towel or a heating pad set on low to relax tight shoulder and neck muscles. · Have someone gently massage your neck and shoulders. · Take pain medicines exactly as directed. ? If the doctor gave you a prescription medicine for pain, take it as prescribed. ? If you are not taking a prescription pain medicine, ask your doctor if you can take an over-the-counter medicine. · Be careful not to take pain medicine more often than the instructions allow, because you may get worse or more frequent headaches when the medicine wears off. · Do not ignore new symptoms that occur with a headache, such as a fever, weakness or numbness, vision changes, or confusion. These may be signs of a more serious problem.   To prevent headaches  · Keep a headache diary so you can figure out what triggers your headaches. Avoiding triggers may help you prevent headaches. Record when each headache began, how long it lasted, and what the pain was like (throbbing, aching, stabbing, or dull). Write down any other symptoms you had with the headache, such as nausea, flashing lights or dark spots, or sensitivity to bright light or loud noise. Note if the headache occurred near your period. List anything that might have triggered the headache, such as certain foods (chocolate, cheese, wine) or odors, smoke, bright light, stress, or lack of sleep. · Find healthy ways to deal with stress. Headaches are most common during or right after stressful times. Take time to relax before and after you do something that has caused a headache in the past.  · Try to keep your muscles relaxed by keeping good posture. Check your jaw, face, neck, and shoulder muscles for tension, and try relaxing them. When sitting at a desk, change positions often, and stretch for 30 seconds each hour. · Get plenty of sleep and exercise. · Eat regularly and well. Long periods without food can trigger a headache. · Treat yourself to a massage. Some people find that regular massages are very helpful in relieving tension. · Limit caffeine by not drinking too much coffee, tea, or soda. But don't quit caffeine suddenly, because that can also give you headaches. · Reduce eyestrain from computers by blinking frequently and looking away from the computer screen every so often. Make sure you have proper eyewear and that your monitor is set up properly, about an arm's length away. · Seek help if you have depression or anxiety. Your headaches may be linked to these conditions. Treatment can both prevent headaches and help with symptoms of anxiety or depression. When should you call for help? Call 911 anytime you think you may need emergency care.  For example, call if:    · You have signs of a stroke. These may include:  ? Sudden numbness, paralysis, or weakness in your face, arm, or leg, especially on only one side of your body. ? Sudden vision changes. ? Sudden trouble speaking. ? Sudden confusion or trouble understanding simple statements. ? Sudden problems with walking or balance. ? A sudden, severe headache that is different from past headaches.    Call your doctor now or seek immediate medical care if:    · You have a new or worse headache.     · Your headache gets much worse. Where can you learn more? Go to http://juan diego-jean pierre.info/. Enter M271 in the search box to learn more about \"Headache: Care Instructions. \"  Current as of: March 28, 2019  Content Version: 12.2  © 0581-9774 [employer]. Care instructions adapted under license by [employer] (which disclaims liability or warranty for this information). If you have questions about a medical condition or this instruction, always ask your healthcare professional. [place] [address] any warranty or liability for your use of this information.

## 2019-11-24 NOTE — ED PROVIDER NOTES
42year old female presenting to the ED for headache. Pt notes that on Friday she was napping in the afternoon, woke up and new onset of a headache, frontal, shooting, then generalized, throbbing. Also notes CP as well, generalized, throbbing, no modifying factors noted, no other associated symptoms. .  Pt notes that headache has been constant since onset, somewhat relieved with Nyquil and Aleve. Pt notes that she had a fever yesterday. No medications today. Pt also notes dizziness with position changes. Denies vision changes, numbness, weakness. No head trauma. No other concerns. Past medical history: Arthritis, asthma, morbid obesity, anxiety  Social: Non-smoker           Past Medical History:   Diagnosis Date    Advance directive discussed with patient 04/07/2016    Arthritis     Asthma     [facility]     Chronic obstructive pulmonary disease ([facility] [address]     3/16/15 [facility]    Headache(784.0)     Hyperlipidemia     Hypertension     NO MEDS    [facility]nee [facility], acute 11/2013    [facility]    Morbid obesity (HCC)     Nausea & vomiting     Other ill-defined conditions(799.89)     recent ankle fx 11/21/12(not wearing air cast)    Psychiatric disorder     ANXIETY    Sleep apnea        Past Surgical History:   Procedure Laterality Date    HX GASTRECTOMY  12/29/2016    lap sleeve gastrectomy by Dr Delarosa Mins  2008    c section    HX GYN  t-14    d and c post miscarriage    HX GYN  12/5/12    HYSTEROSCOPIC REMOVAL OF IUD    HX KNEE ARTHROSCOPY      L KNEE    HX ORTHOPAEDIC      right hand    HX ORTHOPAEDIC      right tendon repair at age 11-16         Family History:   Problem Relation Age of Onset    Diabetes Mother     Hypertension Mother    [employer] Elevated Lipids Mother     Asthma Sister     Lung Disease Maternal Grandmother     Anesth Problems Neg Hx        Social History     Socioeconomic History    Marital status: SINGLE     Spouse name: Not on file    Number of children: Not on file    Years of education: Not on file    Highest education level: Not on file   Occupational History    Not on file   Social Needs    Financial resource strain: Not on file    Food insecurity:     Worry: Not on file     Inability: Not on file    Transportation needs:     Medical: Not on file     Non-medical: Not on file   Tobacco Use    Smoking status: Never Smoker    Smokeless tobacco: Never Used   Substance and Sexual Activity    Alcohol use: No    Drug use: No     Types: Prescription, OTC    Sexual activity: Never   Lifestyle    Physical activity:     Days per week: Not on file     Minutes per session: Not on file    Stress: Not on file   Relationships    Social connections:     Talks on phone: Not on file     Gets together: Not on file     Attends [employer] service: Not on file     Active member of club or organization: Not on file     Attends meetings of clubs or organizations: Not on file     Relationship status: Not on file    Intimate partner violence:     Fear of current or ex partner: Not on file     Emotionally abused: Not on file     Physically abused: Not on file     Forced sexual activity: Not on file   Other Topics Concern    Not on file   Social History Narrative    ** Merged History Encounter **              ALLERGIES: Patient has no known allergies. Review of Systems   Constitutional: Positive for fever (Yesterday). HENT: Negative for congestion, facial swelling and sore throat. Eyes: Positive for photophobia. Negative for visual disturbance. Respiratory: Negative for shortness of breath. Cardiovascular: Positive for chest pain. Gastrointestinal: Negative for vomiting. Musculoskeletal: Negative for neck stiffness. Skin: Negative for wound. Neurological: Positive for headaches. Negative for syncope. All other systems reviewed and are negative.       Vitals:    11/24/19 0844 11/24/19 1020   BP: 136/86 132/85   Pulse: (!) 106 98   Resp: 17 18   Temp: 99.8 °F (37.7 °C) 99.9 °F (37.7 °C)   SpO2: 99% 99%   Weight: 130.6 kg (288 lb)    Height: 5' 3\" (1.6 m)             Physical Exam  Vitals signs and nursing note reviewed. Constitutional:       General: She is not in acute distress. Appearance: She is well-developed. Comments: -American female, sweater pulled over her head, no acute distress   HENT:      Head: Normocephalic and atraumatic. Right Ear: External ear normal.      Left Ear: External ear normal.   Eyes:      General: No scleral icterus. Extraocular Movements: Extraocular movements intact. Conjunctiva/sclera: Conjunctivae normal.      Pupils: Pupils are equal, round, and reactive to light. Comments: Photophobia noted on exam   Neck:      Musculoskeletal: Neck supple. Trachea: No tracheal deviation. Comments: Neck supple  Cardiovascular:      Rate and Rhythm: Normal rate and regular rhythm. Heart sounds: Normal heart sounds. No murmur. No friction rub. No gallop. Pulmonary:      Effort: Pulmonary effort is normal. No respiratory distress. Breath sounds: Normal breath sounds. No stridor. No wheezing. Abdominal:      General: There is no distension. Palpations: Abdomen is soft. Musculoskeletal: Normal range of motion. Skin:     General: Skin is warm and dry. Neurological:      Mental Status: She is alert and oriented to person, place, and time. Cranial Nerves: No cranial nerve deficit (2 through 12 tested and intact). Gait: Gait normal.   Psychiatric:         Behavior: Behavior normal.          MDM  Number of Diagnoses or Management Options  Acute nonintractable headache, unspecified headache type:   Diagnosis management comments: 24-year-old female presenting to the ER for 48 hours of headache. No focal neurologic symptoms. Headache is throbbing, associated with photophobia, somewhat consistent with migraine.   No sudden onset, neck stiffness concerning for subarachnoid hemorrhage. Afebrile, normal WBC. Non-focal neurologic exam.  Improved with medications in the ED, normal head CT. Discussed possible causes of headache, encouraged symptomatic treatment at home, PCP f/u, return precautions given. Amount and/or Complexity of Data Reviewed  Clinical lab tests: ordered and reviewed  Tests in the radiology section of CPT®: ordered and reviewed  Discuss the patient with other providers: yes (Dr. Dudley Rashid ED attending)           Procedures          Patient reassessed, reports overall significant improvement in headache. Discussed treatment with medications at home, primary care follow-up, return precautions given.   LIUDMILA Cooper  12:36 PM

## 2019-11-24 NOTE — ED NOTES
Olga Bird gave and reviewed discharge instructions with patient. Patient verbalizes understanding of discharge instructions. Pt alert and oriented, appears in no acute distress, respirations equal and unlabored. Ambulatory upon discharge with steady gait.

## 2019-11-24 NOTE — ED TRIAGE NOTES
Pt states that she has had a headache with dizziness with no N/V since Friday, she states that she took aleve with no relief. Pt states that she had sutures put in her left foot a month ago and never had her sutures removed.

## 2020-03-04 ENCOUNTER — TELEPHONE (OUTPATIENT)
Dept: SURGERY | Age: 40
End: 2020-03-04

## 2020-08-12 ENCOUNTER — VIRTUAL VISIT (OUTPATIENT)
Dept: FAMILY MEDICINE CLINIC | Age: 40
End: 2020-08-12
Payer: MEDICAID

## 2020-08-12 DIAGNOSIS — E66.01 MORBID OBESITY (HCC): ICD-10-CM

## 2020-08-12 DIAGNOSIS — J45.40 MODERATE PERSISTENT ASTHMA WITHOUT COMPLICATION: ICD-10-CM

## 2020-08-12 DIAGNOSIS — R55 SYNCOPE, UNSPECIFIED SYNCOPE TYPE: Primary | ICD-10-CM

## 2020-08-12 DIAGNOSIS — D50.9 IRON DEFICIENCY ANEMIA, UNSPECIFIED IRON DEFICIENCY ANEMIA TYPE: ICD-10-CM

## 2020-08-12 PROCEDURE — 99213 OFFICE O/P EST LOW 20 MIN: CPT | Performed by: FAMILY MEDICINE

## 2020-08-12 RX ORDER — FLUTICASONE PROPIONATE AND SALMETEROL 250; 50 UG/1; UG/1
POWDER RESPIRATORY (INHALATION)
COMMUNITY
Start: 2020-01-28 | End: 2020-08-12 | Stop reason: SDUPTHER

## 2020-08-12 RX ORDER — ALBUTEROL SULFATE 0.83 MG/ML
SOLUTION RESPIRATORY (INHALATION)
COMMUNITY
Start: 2018-12-04

## 2020-08-12 NOTE — PROGRESS NOTES
Chief Complaint   Patient presents with    Loss of Consciousness       Patient-Reported Vitals 8/12/2020   Patient-Reported Weight 278   Patient-Reported Height 5'4\"       Pain Scale: /10  Pain Location:       **THIS IS A VIRTUAL VISIT VIA A VIDEO SYNCHRONOUS DISCUSSION OVER DOXY. ME PATIENT AGREED TO HAVE THEIR CARE DELIVERED OVER A [employer] VIDEO VISIT IN PLACE OF THEIR REGULARLY SCHEDULED OFFICE VISIT**       Cynthia Botello is a 47 y.o. female who was seen by synchronous (real-time) audio-video technology on 8/12/2020 for Loss of Consciousness        Assessment & Plan:   Diagnoses and all orders for this visit:    1. Syncope, unspecified syncope type  -     CBC WITH AUTOMATED DIFF  -     URINALYSIS W/ RFLX MICROSCOPIC  -     TSH 3RD GENERATION  -     METABOLIC PANEL, COMPREHENSIVE    2. Iron deficiency anemia, unspecified iron deficiency anemia type  -     CBC WITH AUTOMATED DIFF  -     FERRITIN    3. Morbid obesity ([address]    4. Moderate persistent asthma without complication      The complexity of medical decision making for this visit is moderate         I spent at least 12 minutes on this visit with this established patient. Subjective:     Past weekend near syncopal episode. 2 yrs ago similar episode. Sudden dizziness. Lasted 20 minutes. At wedding. Inside [facility] [place] by [facility]. Walks 15 minutes daily. Uses inhaler. Will check baseline labs. Keep well hydrated. Try to increase walking tolerance. Prior to Admission medications    Medication Sig Start Date End Date Taking? Authorizing Provider   butalbital-acetaminophen-caff (FIORICET) -40 mg per capsule Take 1 Cap by mouth every four (4) hours as needed for Pain. 11/24/19   LIUDMILA Rose   ketorolac (TORADOL) 10 mg tablet Take 1 Tab by mouth every six (6) hours as needed for Pain. 11/24/19   LIUDMILA Rose   nystatin (MYCOSTATIN) topical cream Apply  to affected area two (2) times a day.  4/25/19   Jordan Muller NP   triamcinolone (KENALOG) 0.1 % lotion Apply  to affected area three (3) times daily. use thin layer 7/6/18   yLric LEE PA-C   ergocalciferol (ERGOCALCIFEROL) 50,000 unit capsule TAKE 1 CAPSULE BY MOUTH EVERY 7 DAYS 4/7/18   Carlos Shaw NP   aspirin 81 mg chewable tablet Take 81 mg by mouth daily. Provider, Historical   OTHER Cholesterol medication 1x a day    Provider, Historical   atorvastatin (LIPITOR) 20 mg tablet Take  by mouth daily. Provider, Historical   DULoxetine (CYMBALTA) 30 mg capsule Take 1 Cap by mouth daily. 2/27/18   Jen Him, DO   BIOTIN PO Take  by mouth. Provider, Historical   albuterol (VENTOLIN HFA) 90 mcg/actuation inhaler Take 2 Puffs by inhalation every four (4) hours as needed for Wheezing. 3/6/17   Tu Turk NP   multivitamin with iron tablet Take 1 Tab by mouth daily. Provider, Historical   cyanocobalamin (VITAMIN B12) 500 mcg tablet Take 500 mcg by mouth daily. Provider, Historical   hyoscyamine SL (LEVSIN/SL) 0.125 mg SL tablet 1 Tab by SubLINGual route every four (4) hours as needed for Cramping. 12/8/16   Carlos Shaw NP   montelukast (SINGULAIR) 10 mg tablet Take 1 Tab by mouth daily. 5/11/16   Yoseph Devine MD   fluticasone-salmeterol (ADVAIR) 500-50 mcg/dose diskus inhaler Take 1 Puff by inhalation every twelve (12) hours. 5/11/16   Yoseph Devine MD     Patient Active Problem List   Diagnosis Code    COPD (chronic obstructive pulmonary disease) ([facility] J44.9    Asthma J45.909    Obstructive sleep apnea G47.33    Arthritis M19.90    Pain, joint, multiple sites M25.50    Morbid obesity with BMI of 60.0-69.9, adult ([facility]) E66.01, Z68.44    Chronic pain of left knee M25.562, G89.29    Sleep apnea G47.30    Morbid obesity ([facility]) E66.01     Current Outpatient Medications   Medication Sig Dispense Refill    butalbital-acetaminophen-caff (FIORICET) -40 mg per capsule Take 1 Cap by mouth every four (4) hours as needed for Pain.  6 Cap 0    ketorolac (TORADOL) 10 mg tablet Take 1 Tab by mouth every six (6) hours as needed for Pain. 12 Tab 0    nystatin (MYCOSTATIN) topical cream Apply  to affected area two (2) times a day. 15 g 1    triamcinolone (KENALOG) 0.1 % lotion Apply  to affected area three (3) times daily. use thin layer 60 mL 0    ergocalciferol (ERGOCALCIFEROL) 50,000 unit capsule TAKE 1 CAPSULE BY MOUTH EVERY 7 DAYS 12 Cap 0    aspirin 81 mg chewable tablet Take 81 mg by mouth daily.  OTHER Cholesterol medication 1x a day      atorvastatin (LIPITOR) 20 mg tablet Take  by mouth daily.  DULoxetine (CYMBALTA) 30 mg capsule Take 1 Cap by mouth daily. 30 Cap 2    BIOTIN PO Take  by mouth.  albuterol (VENTOLIN HFA) 90 mcg/actuation inhaler Take 2 Puffs by inhalation every four (4) hours as needed for Wheezing. 1 Inhaler 0    multivitamin with iron tablet Take 1 Tab by mouth daily.  cyanocobalamin (VITAMIN B12) 500 mcg tablet Take 500 mcg by mouth daily.  hyoscyamine SL (LEVSIN/SL) 0.125 mg SL tablet 1 Tab by SubLINGual route every four (4) hours as needed for Cramping. 30 Tab 0    montelukast (SINGULAIR) 10 mg tablet Take 1 Tab by mouth daily. 90 Tab 3    fluticasone-salmeterol (ADVAIR) 500-50 mcg/dose diskus inhaler Take 1 Puff by inhalation every twelve (12) hours. 1 Inhaler 0     No Known Allergies  Past Medical History:   Diagnosis Date    Advance directive discussed with patient 04/07/2016    Arthritis     Asthma     [facility]     Chronic obstructive pulmonary disease ([facility]     3/16/15 notes from [facility]    Headache(784.0)     Hyperlipidemia     Hypertension     NO MEDS    Knee pain, acute 11/2013    [facility]    Morbid obesity (HCC)     Nausea & vomiting     Other ill-defined conditions(799.89)     recent ankle fx 11/21/12(not wearing air cast)    Psychiatric disorder     ANXIETY    Sleep apnea      Past Surgical History:   Procedure Laterality Date    HX GASTRECTOMY  12/29/2016    lap sleeve gastrectomy by Dr Christal Curry  2008    c section    HX GYN  t-14    d and c post miscarriage    HX GYN  12/5/12    HYSTEROSCOPIC REMOVAL OF IUD    HX KNEE ARTHROSCOPY      L KNEE    HX ORTHOPAEDIC      right hand    HX ORTHOPAEDIC      right tendon repair at age 11-16     Family History   Problem Relation Age of Onset    Diabetes Mother     Hypertension Mother    [facility] Elevated Lipids Mother     Asthma Sister     Lung Disease Maternal Grandmother     Anesth Problems Neg Hx      Social History     Tobacco Use    Smoking status: Never Smoker    Smokeless tobacco: Never Used   Substance Use Topics    Alcohol use: No       ROS    Objective:   No flowsheet data found. [INSTRUCTIONS:  \"[x]\" Indicates a positive item  \"[]\" Indicates a negative item  -- DELETE ALL ITEMS NOT EXAMINED]    Constitutional: [x] Appears well-developed and well-nourished [x] No apparent distress      [] Abnormal -     Mental status: [x] Alert and awake  [x] Oriented to person/place/time [x] Able to follow commands    [] Abnormal -     Eyes:   EOM    [x]  Normal    [] Abnormal -   Sclera  [x]  Normal    [] Abnormal -          Discharge [x]  None visible   [] Abnormal -     Pulmonary/Chest: [x] Respiratory effort normal   [x] No visualized signs of difficulty breathing or respiratory distress        [] Abnormal -      Psychiatric:       [x] Normal Affect [] Abnormal -        [x] No Hallucinations    Other pertinent observable physical exam findings:-        We discussed the expected course, resolution and complications of the diagnosis(es) in detail. Medication risks, benefits, costs, interactions, and alternatives were discussed as indicated. I advised her to contact the office if her condition worsens, changes or fails to improve as anticipated. She expressed understanding with the diagnosis(es) and plan.        Bettie Toledo, who was evaluated through a patient-initiated, synchronous (real-time) audio-video encounter, and/or her healthcare decision maker, is aware that it is a billable service, with coverage as determined by her insurance carrier. She provided verbal consent to proceed: Yes, and patient identification was verified. It was conducted pursuant to the emergency declaration under the [facility], [id number] waiver authority and the [employer] General Act. A caregiver was present when appropriate. Ability to conduct physical exam was limited. I was at home. The patient was at home.       Murriel Najjar, MD

## 2021-02-24 ENCOUNTER — TELEPHONE (OUTPATIENT)
Dept: SURGERY | Age: 41
End: 2021-02-24

## 2021-10-18 NOTE — PROGRESS NOTES
HISTORY OF PRESENT ILLNESS  Jackson Bey is a 46 y.o. female. HPI     Pt is here to establish care.   This is an established visit completed with telemedicine was completed with video assist  the patient acknowledges and agrees to this method of visitation najmae      Discussed covid vaccine     BP has recently been elevated several elevated readings in the 130s over 90s or 150 over 90s discussed this with her she has not been monitoring her blood pressure at home has not been on blood pressure medication in the past    Ordered labs    PMHx:    She follows with Dr. Alisson Isidro at [facility] for COPD and asthma she is on disability for this  Last visit 1/21   She has advair and albuterol inhalers    Pt followed with Dr. Melvi Henriquez (ortho)    Will provide referral to gyn     Pt has been dx with LAKE  She does not have a CPAP machine for this, states that she was never given one  Discussed risks of untreated sleep apnea  Will provide referral to sleep specialist    She used to take cymbalta for anxiety in the past looks like this was given by a neurologist back around 2017 or 2018 after she had a TIA, she is doing ok without meds currently    She used to take lipitor as well and self d/c this  Will check cholesterol on labs    She recently had a blood transfusion at [facility] just recently a week or 2 ago, she was there because she caught cold from kids and was feeling dizzy, hemoglobin 6 per pt  Per pt she did not stay in the hospital overnight  She states that her menstrual cycle is only heavy on the first day  Discussed will provide referral to GI  Discussed need to take OTC iron supplement  She mentions that has been eating a lot of ice recently, discussed that this can happen with anemia    She follows with bariatric surgeon, Dr. Timbo Lieberman, discussed scheduling f/u as bariatric surgery can contribute to anemia    She had TIA and stroke workup in the past  She used to follow with Dr. Liset Stuart (neuro)  Reviewed note 2/27/18:  Neuropsychologic testing  Start Cymbalta 30mg daily  Cont. ASA 81mg and statin therapy for stroke prevention  Goal SBP<140, LDL<70  Follow-up Disposition:  Return in about 3 months (around 5/27/2018). Reviewed echo 2017: ER55-60%    FMHx:    Mom: diabetes  Respiratory issues    PSHx: gastric sleeve 2017, surgery on L knee for arthritis    SHx: she does drink or smoke, has 5 kids, is not , does not work and receives social security    PREVENTIVE:  Colonoscopy: Due for anemia work-up  Dexa: not yet needed  Mammo: not yet needed  Pap: provided referral to gyn at [facility]  Tdap: 9/25/19  Pneumovax: 2011  Shingrix: not yet needed  Flu shot: declines  Eye exam: due   Lipids: ordered  Covid: she is unsure about this encouraged her to get the vaccine with her risk factors      Patient Active Problem List    Diagnosis Date Noted    Mixed hyperlipidemia 10/19/2021    Iron deficiency anemia 08/12/2020    Chronic pain of left knee 05/11/2016    Morbid obesity with BMI of 60.0-69.9, adult ([facility] 04/07/2016    Obstructive sleep apnea 02/15/2012    Arthritis 02/15/2012    COPD (chronic obstructive pulmonary disease) ([facility] 09/08/2011    Asthma 09/08/2011     Current Outpatient Medications   Medication Sig Dispense Refill    albuterol (PROVENTIL VENTOLIN) 2.5 mg /3 mL (0.083 %) nebu 2.5 mg = 3 mL, Inhalation, every 6 hours, 3 Refills, Dispense: 1,080, mL, Pharm[id number]      albuterol (VENTOLIN HFA) 90 mcg/actuation inhaler Take 2 Puffs by inhalation every four (4) hours as needed for Wheezing. 1 Inhaler 0    montelukast (SINGULAIR) 10 mg tablet Take 1 Tab by mouth daily. 90 Tab 3    fluticasone-salmeterol (ADVAIR) 500-50 mcg/dose diskus inhaler Take 1 Puff by inhalation every twelve (12) hours. 1 Inhaler 0    nystatin (MYCOSTATIN) topical cream Apply  to affected area two (2) times a day.  (Patient not taking: Reported on 10/19/2021) 15 g 1    triamcinolone (KENALOG) 0.1 % lotion Apply  to affected area three (3) times daily. use thin layer (Patient not taking: Reported on 10/19/2021) 60 mL 0    OTHER Cholesterol medication 1x a day (Patient not taking: Reported on 10/19/2021)      multivitamin with iron tablet Take 1 Tab by mouth daily. (Patient not taking: Reported on 10/19/2021)       Past Surgical History:   Procedure Laterality Date    HX GASTRECTOMY  12/29/2016    lap sleeve gastrectomy by Dr Trev Sky  2008    c section    HX GYN  t-14    d and c post miscarriage    HX GYN  12/5/12    HYSTEROSCOPIC REMOVAL OF IUD    HX KNEE ARTHROSCOPY      L KNEE    HX ORTHOPAEDIC      right hand    HX ORTHOPAEDIC      right tendon repair at age 11-16      Lab Results   Component Value Date/Time    WBC 6.2 11/24/2019 09:01 AM    HGB 9.4 (L) 11/24/2019 09:01 AM    HCT 32.4 (L) 11/24/2019 09:01 AM    PLATELET 406 95/35/1289 09:01 AM    MCV 72.8 (L) 11/24/2019 09:01 AM     Lab Results   Component Value Date/Time    Hemoglobin A1c 4.7 12/27/2017 06:36 AM    Glucose 98 11/24/2019 09:01 AM    Glucose (POC) 81 12/26/2017 10:47 PM    LDL, calculated 85.8 12/27/2017 06:36 AM    Creatinine 0.67 11/24/2019 09:01 AM      Lab Results   Component Value Date/Time    GFR est non-AA >60 11/24/2019 09:01 AM    GFR est AA >60 11/24/2019 09:01 AM    Creatinine 0.67 11/24/2019 09:01 AM    BUN 11 11/24/2019 09:01 AM    Sodium 136 11/24/2019 09:01 AM    Potassium 3.8 11/24/2019 09:01 AM    Chloride 108 11/24/2019 09:01 AM    CO2 23 11/24/2019 09:01 AM    Magnesium 1.9 12/27/2017 06:36 AM    Phosphorus 3.6 12/27/2017 06:36 AM        Review of Systems   Respiratory: Negative for shortness of breath. Cardiovascular: Negative for chest pain. Physical Exam  Constitutional:       General: She is not in acute distress. Appearance: Normal appearance. She is not ill-appearing, toxic-appearing or diaphoretic. HENT:      Head: Normocephalic and atraumatic.    Eyes: General:         Right eye: No discharge. Left eye: No discharge. Conjunctiva/sclera: Conjunctivae normal.   Pulmonary:      Effort: No respiratory distress. Neurological:      Mental Status: She is alert and oriented to person, place, and time. Mental status is at baseline. Gait: Gait normal.   Psychiatric:         Mood and Affect: Mood normal.         Behavior: Behavior normal.         ASSESSMENT and PLAN extended new to me patient visit    ICD-10-CM ICD-9-CM    1. Physical exam      Z00.00 V70.9 REFERRAL TO OBSTETRICS AND GYNECOLOGY      LIPID PANEL   Discussed COVID vaccine she declines flu shot Tdap is up-to-date had pneumonia vaccine in the past    Due for pelvic exam due for labs ordered   METABOLIC PANEL, COMPREHENSIVE      CBC W/O DIFF      TSH 3RD GENERATION      HEMOGLOBIN A1C WITH EAG      HEPATITIS C AB   2. Obstructive sleep apnea     Discussed diagnosis and consequences of untreated sleep apnea she is not wearing a CPAP refer to sleep specialist     G47.33 327.23 SLEEP MEDICINE REFERRAL   3. Mixed hyperlipidemia     Previous on Lipitor she self DC'd this  We will check lipids and treat as needed    No problems taking the medication     E78.2 272.2    4. Chronic obstructive pulmonary disease, unspecified COPD type ([address]       Controlled on Advair and albuterol sees pulmonary at [facility] will get notes for review J44.9 496    5. Morbid obesity with BMI of 60.0-69.9, adult ([address]  E66.01 278.01          History of gastric sleeve in the past weight still above goal    Overdue to follow-up with sleep specialist she states she will schedule appointment Z68.44 V85.44    6.  Iron deficiency anemia, unspecified iron deficiency anemia type  D50.9 280.9 REFERRAL TO GASTROENTEROLOGY       Patient reports severe anemia with a hemoglobin around 6 requiring a blood transfusion  At [facility] recently no reports for review    She reports she received a blood transfusion was not admitted and nothing else was done    We will repeat CBC and ferritin    Discussed she is to take a iron supplement    Needs a GI evaluation with EGD and [place] referral    She is also overdue to see gynecologist of note she only mentions 1 day of heavy menstrual cycles       FERRITIN   7. Elevated BP without diagnosis of hypertension     Likely requires treatment need to start monitoring blood pressure when she comes in for blood work we will have her get her blood pressure checked R03.0 796.2     8 anxiety previously was on medication declines need for medication at this time  9 TIA continues daily aspirin    Depression screen reviewed and negative     Scribed by Gema Kunz of [address] [place] [address] [address], as dictated by Dr. Alexys Beal. Current diagnosis and concerns discussed with pt at length. Pt understands risks and benefits or current treatment plan and medications, and accepts the treatment and medication with any possible risks. Pt asks appropriate questions, which were answered. Pt was instructed to call with any concerns or problems. I have reviewed the note documented by the scribe. The services provided are my own. The documentation is accurate     Crystal Lambert, was evaluated through a synchronous (real-time) audio-video encounter. The patient (or guardian if applicable) is aware that this is a billable service. Verbal consent to proceed has been obtained within the past 12 months. The visit was conducted pursuant to the emergency declaration under the [facility], [address] [place] [address] waiver authority and the [employer] General Act. Patient identification was verified, and a caregiver was present when appropriate. The patient was located in a state where the provider was credentialed to provide care.

## 2021-10-19 ENCOUNTER — VIRTUAL VISIT (OUTPATIENT)
Dept: INTERNAL MEDICINE CLINIC | Age: 41
End: 2021-10-19
Payer: MEDICAID

## 2021-10-19 DIAGNOSIS — G47.33 OBSTRUCTIVE SLEEP APNEA: ICD-10-CM

## 2021-10-19 DIAGNOSIS — G45.9 TIA (TRANSIENT ISCHEMIC ATTACK): ICD-10-CM

## 2021-10-19 DIAGNOSIS — J44.9 CHRONIC OBSTRUCTIVE PULMONARY DISEASE, UNSPECIFIED COPD TYPE (HCC): ICD-10-CM

## 2021-10-19 DIAGNOSIS — Z00.00 PHYSICAL EXAM: Primary | ICD-10-CM

## 2021-10-19 DIAGNOSIS — R03.0 ELEVATED BP WITHOUT DIAGNOSIS OF HYPERTENSION: ICD-10-CM

## 2021-10-19 DIAGNOSIS — F41.9 ANXIETY: ICD-10-CM

## 2021-10-19 DIAGNOSIS — E78.2 MIXED HYPERLIPIDEMIA: ICD-10-CM

## 2021-10-19 DIAGNOSIS — D50.9 IRON DEFICIENCY ANEMIA, UNSPECIFIED IRON DEFICIENCY ANEMIA TYPE: ICD-10-CM

## 2021-10-19 DIAGNOSIS — E66.01 MORBID OBESITY WITH BMI OF 60.0-69.9, ADULT (HCC): ICD-10-CM

## 2021-10-19 PROCEDURE — 99215 OFFICE O/P EST HI 40 MIN: CPT | Performed by: INTERNAL MEDICINE

## 2021-10-19 NOTE — PROGRESS NOTES
Identified pt with two pt identifiers(name and ). Reviewed record in preparation for visit and have obtained necessary documentation. Chief Complaint   Patient presents with   [facility] Establish Care        There were no vitals taken for this visit. Health Maintenance Due   Topic    Hepatitis C Screening     COVID-19 Vaccine (1)    Cervical cancer screen     Lipid Screen     Flu Vaccine (1)       Med Reconciliation: Completed    Coordination of Care Questionnaire:  :   1) Have you been to an emergency room, urgent care, or hospitalized since your last visit? If yes, where when, and reason for visit? No       2. Have seen or consulted any other health care provider since your last visit? If yes, where when, and reason for visit? No       3) Do you have an Advanced Directive/ Living Will in place? No  If yes, do we have a copy on file   If no, would you like information     Patient is accompanied by self I have received verbal consent from Suleiman Pak to discuss any/all medical information while they are present in the room.

## 2022-01-20 ENCOUNTER — TELEPHONE (OUTPATIENT)
Dept: SURGERY | Age: 42
End: 2022-01-20

## 2022-02-18 ENCOUNTER — APPOINTMENT (OUTPATIENT)
Dept: CT IMAGING | Age: 42
End: 2022-02-18
Attending: EMERGENCY MEDICINE
Payer: MEDICAID

## 2022-02-18 ENCOUNTER — APPOINTMENT (OUTPATIENT)
Dept: ULTRASOUND IMAGING | Age: 42
End: 2022-02-18
Attending: EMERGENCY MEDICINE
Payer: MEDICAID

## 2022-02-18 ENCOUNTER — HOSPITAL ENCOUNTER (EMERGENCY)
Age: 42
Discharge: HOME | End: 2022-02-18
Attending: EMERGENCY MEDICINE | Admitting: EMERGENCY MEDICINE
Payer: MEDICAID

## 2022-02-18 VITALS
BODY MASS INDEX: 45.48 KG/M2 | OXYGEN SATURATION: 100 % | WEIGHT: 273 LBS | HEIGHT: 65 IN | RESPIRATION RATE: 16 BRPM | SYSTOLIC BLOOD PRESSURE: 111 MMHG | TEMPERATURE: 97.7 F | HEART RATE: 68 BPM | DIASTOLIC BLOOD PRESSURE: 66 MMHG

## 2022-02-18 DIAGNOSIS — D25.9 UTERINE LEIOMYOMA, UNSPECIFIED LOCATION: Primary | ICD-10-CM

## 2022-02-18 DIAGNOSIS — D64.9 ANEMIA, UNSPECIFIED TYPE: ICD-10-CM

## 2022-02-18 DIAGNOSIS — N83.201 CYST OF RIGHT OVARY: ICD-10-CM

## 2022-02-18 DIAGNOSIS — N94.4 SPASMODIC DYSMENORRHEA: ICD-10-CM

## 2022-02-18 LAB
ALBUMIN SERPL-MCNC: 3.4 G/DL (ref 3.5–5)
ALBUMIN/GLOB SERPL: 0.7 {RATIO} (ref 1.1–2.2)
ALP SERPL-CCNC: 79 U/L (ref 45–117)
ALT SERPL-CCNC: 19 U/L (ref 12–78)
ANION GAP SERPL CALC-SCNC: 5 MMOL/L (ref 5–15)
APPEARANCE UR: CLEAR
AST SERPL-CCNC: 13 U/L (ref 15–37)
BACTERIA URNS QL MICRO: ABNORMAL /HPF
BASOPHILS # BLD: 0.1 K/UL (ref 0–0.1)
BASOPHILS NFR BLD: 1 % (ref 0–1)
BILIRUB SERPL-MCNC: 0.3 MG/DL (ref 0.2–1)
BILIRUB UR QL: NEGATIVE
BUN SERPL-MCNC: 8 MG/DL (ref 6–20)
BUN/CREAT SERPL: 12 (ref 12–20)
CALCIUM SERPL-MCNC: 9.9 MG/DL (ref 8.5–10.1)
CHLORIDE SERPL-SCNC: 115 MMOL/L (ref 97–108)
CO2 SERPL-SCNC: 22 MMOL/L (ref 21–32)
COLOR UR: ABNORMAL
COMMENT, HOLDF: NORMAL
CREAT SERPL-MCNC: 0.66 MG/DL (ref 0.55–1.02)
DIFFERENTIAL METHOD BLD: ABNORMAL
EOSINOPHIL # BLD: 0 K/UL (ref 0–0.4)
EOSINOPHIL NFR BLD: 0 % (ref 0–7)
EPITH CASTS URNS QL MICRO: ABNORMAL /LPF
ERYTHROCYTE [DISTWIDTH] IN BLOOD BY AUTOMATED COUNT: 21.3 % (ref 11.5–14.5)
GLOBULIN SER CALC-MCNC: 4.7 G/DL (ref 2–4)
GLUCOSE SERPL-MCNC: 92 MG/DL (ref 65–100)
GLUCOSE UR STRIP.AUTO-MCNC: NEGATIVE MG/DL
HCG UR QL: NEGATIVE
HCT VFR BLD AUTO: 30.6 % (ref 35–47)
HGB BLD-MCNC: 8.6 G/DL (ref 11.5–16)
HGB UR QL STRIP: ABNORMAL
HYALINE CASTS URNS QL MICRO: ABNORMAL /LPF (ref 0–5)
IMM GRANULOCYTES # BLD AUTO: 0 K/UL (ref 0–0.04)
IMM GRANULOCYTES NFR BLD AUTO: 0 % (ref 0–0.5)
KETONES UR QL STRIP.AUTO: NEGATIVE MG/DL
LEUKOCYTE ESTERASE UR QL STRIP.AUTO: NEGATIVE
LYMPHOCYTES # BLD: 2.1 K/UL (ref 0.8–3.5)
LYMPHOCYTES NFR BLD: 23 % (ref 12–49)
MCH RBC QN AUTO: 19.8 PG (ref 26–34)
MCHC RBC AUTO-ENTMCNC: 28.1 G/DL (ref 30–36.5)
MCV RBC AUTO: 70.3 FL (ref 80–99)
MONOCYTES # BLD: 0.7 K/UL (ref 0–1)
MONOCYTES NFR BLD: 8 % (ref 5–13)
NEUTS SEG # BLD: 6.1 K/UL (ref 1.8–8)
NEUTS SEG NFR BLD: 68 % (ref 32–75)
NITRITE UR QL STRIP.AUTO: NEGATIVE
NRBC # BLD: 0 K/UL (ref 0–0.01)
NRBC BLD-RTO: 0 PER 100 WBC
PH UR STRIP: 6.5 [PH] (ref 5–8)
PLATELET # BLD AUTO: 319 K/UL (ref 150–400)
POTASSIUM SERPL-SCNC: 4.1 MMOL/L (ref 3.5–5.1)
PROT SERPL-MCNC: 8.1 G/DL (ref 6.4–8.2)
PROT UR STRIP-MCNC: NEGATIVE MG/DL
RBC # BLD AUTO: 4.35 M/UL (ref 3.8–5.2)
RBC #/AREA URNS HPF: ABNORMAL /HPF (ref 0–5)
RBC MORPH BLD: ABNORMAL
SAMPLES BEING HELD,HOLD: NORMAL
SODIUM SERPL-SCNC: 142 MMOL/L (ref 136–145)
SP GR UR REFRACTOMETRY: 1.02 (ref 1–1.03)
UR CULT HOLD, URHOLD: NORMAL
UROBILINOGEN UR QL STRIP.AUTO: 1 EU/DL (ref 0.2–1)
WBC # BLD AUTO: 9 K/UL (ref 3.6–11)
WBC URNS QL MICRO: ABNORMAL /HPF (ref 0–4)

## 2022-02-18 PROCEDURE — 74176 CT ABD & PELVIS W/O CONTRAST: CPT

## 2022-02-18 PROCEDURE — 96375 TX/PRO/DX INJ NEW DRUG ADDON: CPT

## 2022-02-18 PROCEDURE — 80053 COMPREHEN METABOLIC PANEL: CPT

## 2022-02-18 PROCEDURE — 36415 COLL VENOUS BLD VENIPUNCTURE: CPT

## 2022-02-18 PROCEDURE — 74011250636 HC RX REV CODE- 250/636: Performed by: EMERGENCY MEDICINE

## 2022-02-18 PROCEDURE — 96374 THER/PROPH/DIAG INJ IV PUSH: CPT

## 2022-02-18 PROCEDURE — 99284 EMERGENCY DEPT VISIT MOD MDM: CPT

## 2022-02-18 PROCEDURE — 81025 URINE PREGNANCY TEST: CPT

## 2022-02-18 PROCEDURE — 85025 COMPLETE CBC W/AUTO DIFF WBC: CPT

## 2022-02-18 PROCEDURE — 83690 ASSAY OF LIPASE: CPT

## 2022-02-18 PROCEDURE — 76856 US EXAM PELVIC COMPLETE: CPT

## 2022-02-18 PROCEDURE — 81001 URINALYSIS AUTO W/SCOPE: CPT

## 2022-02-18 RX ORDER — GABAPENTIN 300 MG/1
300 CAPSULE ORAL 3 TIMES DAILY
Qty: 20 CAPSULE | Refills: 0 | Status: SHIPPED | OUTPATIENT
Start: 2022-02-18 | End: 2022-11-03

## 2022-02-18 RX ORDER — KETOROLAC TROMETHAMINE 10 MG/1
10 TABLET, FILM COATED ORAL
Qty: 16 TABLET | Refills: 0 | Status: SHIPPED | OUTPATIENT
Start: 2022-02-18 | End: 2022-02-25

## 2022-02-18 RX ORDER — KETOROLAC TROMETHAMINE 30 MG/ML
15 INJECTION, SOLUTION INTRAMUSCULAR; INTRAVENOUS
Status: COMPLETED | OUTPATIENT
Start: 2022-02-18 | End: 2022-02-18

## 2022-02-18 RX ORDER — DIPHENHYDRAMINE HYDROCHLORIDE 50 MG/ML
12.5 INJECTION, SOLUTION INTRAMUSCULAR; INTRAVENOUS
Status: COMPLETED | OUTPATIENT
Start: 2022-02-18 | End: 2022-02-18

## 2022-02-18 RX ORDER — MORPHINE SULFATE 4 MG/ML
4 INJECTION INTRAVENOUS
Status: COMPLETED | OUTPATIENT
Start: 2022-02-18 | End: 2022-02-18

## 2022-02-18 RX ORDER — ONDANSETRON 2 MG/ML
4 INJECTION INTRAMUSCULAR; INTRAVENOUS
Status: COMPLETED | OUTPATIENT
Start: 2022-02-18 | End: 2022-02-18

## 2022-02-18 RX ORDER — DIPHENHYDRAMINE HYDROCHLORIDE 50 MG/ML
INJECTION, SOLUTION INTRAMUSCULAR; INTRAVENOUS
Status: DISCONTINUED
Start: 2022-02-18 | End: 2022-02-18 | Stop reason: HOSPADM

## 2022-02-18 RX ADMIN — DIPHENHYDRAMINE HYDROCHLORIDE 12.5 MG: 50 INJECTION INTRAMUSCULAR; INTRAVENOUS at 14:02

## 2022-02-18 RX ADMIN — ONDANSETRON HYDROCHLORIDE 4 MG: 2 SOLUTION INTRAMUSCULAR; INTRAVENOUS at 13:49

## 2022-02-18 RX ADMIN — KETOROLAC TROMETHAMINE 15 MG: 30 INJECTION, SOLUTION INTRAMUSCULAR; INTRAVENOUS at 13:48

## 2022-02-18 RX ADMIN — MORPHINE SULFATE 4 MG: 4 INJECTION INTRAVENOUS at 13:49

## 2022-02-18 NOTE — ED TRIAGE NOTES
States started menstrual period last night and is having severe cramping that feels like contractions. Has not taken home pregnancy test. Hx irregular periods. LMP 01/06 then began to have period last night but bleeding has ceased. Does not normally require medications for cramping.  Did not take medications prior to arrival.

## 2022-02-18 NOTE — ED PROVIDER NOTES
HPI   Patient is a 82-year-old female with past medical history significant for arthritis, asthma, COPD, headaches, hyperlipidemia, hypertension, morbid obesity, sleep apnea and anxiety who presents to the ED reporting sharp stabbing vaginal area pains that started abruptly today. She states she has irregular menstrual cycles and her last menstrual cycle was 1/20/2022. Yesterday she had some vaginal bleeding which last for less than 1 day. She now is having sharp vaginal area pains consistent with contractions. [id number]. She has not had any food or medications today prior to arrival.Denies any fever, difficulty breathing, difficulty swallowing, SOB,chest pain, abdominal pain or back pain. Denies any nausea, vomiting or diarrhea. Past Medical History:   Diagnosis Date    Advance directive discussed with patient 04/07/2016    Arthritis     Asthma     [facility]     Chronic obstructive pulmonary disease ([facility] [address]     [facility]/16/15 [facility]    Headache(784.0)     Hyperlipidemia     Hypertension     NO MEDS    Knee pain, acute 11/2013    [facility]    Morbid obesity (HCC)     Nausea & vomiting     Other ill-defined conditions(799.89)     recent ankle fx 11/21/12(not wearing air cast)    Psychiatric disorder     ANXIETY    Sleep apnea        Past Surgical History:   Procedure Laterality Date    HX GASTRECTOMY  12/29/2016    lap sleeve gastrectomy by Dr Asim Zapata  2008    c section    HX GYN  t-14    d and c post miscarriage    HX GYN  12/5/12    HYSTEROSCOPIC REMOVAL OF IUD    HX KNEE ARTHROSCOPY      L KNEE    HX ORTHOPAEDIC      right hand    HX ORTHOPAEDIC      right tendon repair at age 11-16         Family History:   Problem Relation Age of Onset    Diabetes Mother     Hypertension Mother    [facility] Elevated Lipids Mother     Asthma Sister     Lung Disease Maternal Grandmother     Anesth Problems Neg Hx        Social History     Socioeconomic History    Marital status: SINGLE Spouse name: Not on file    Number of children: Not on file    Years of education: Not on file    Highest education level: Not on file   Occupational History    Not on file   Tobacco Use    Smoking status: Never Smoker    Smokeless tobacco: Never Used   Substance and Sexual Activity    Alcohol use: No    Drug use: No     Types: Prescription, OTC    Sexual activity: Never   Other Topics Concern    Not on file   Social History Narrative    ** Merged History Encounter **          Social Determinants of Health     Financial Resource Strain:     Difficulty of Paying Living Expenses: Not on file   Food Insecurity:     Worried About Running Out of Food in the Last Year: Not on file    Re of Food in the Last Year: Not on file   Transportation Needs:     Lack of Transportation (Medical): Not on file    Lack of Transportation (Non-Medical): Not on file   Physical Activity:     Days of Exercise per Week: Not on file    Minutes of Exercise per Session: Not on file   Stress:     Feeling of Stress : Not on file   Social Connections:     Frequency of Communication with Friends and Family: Not on file    Frequency of Social Gatherings with Friends and Family: Not on file    Attends Buddhism Services: Not on file    Active Member of [address] [place] [address] [employer] or Organizations: Not on file    Attends Club or Organization Meetings: Not on file    Marital Status: Not on file   Intimate Partner Violence:     Fear of Current or Ex-Partner: Not on file    Emotionally Abused: Not on file    Physically Abused: Not on file    Sexually Abused: Not on file   Housing Stability:     Unable to Pay for Housing in the Last Year: Not on file    Number of [place] in the Last Year: Not on file    Unstable Housing in the Last Year: Not on file         ALLERGIES: Patient has no known allergies. Review of Systems   Constitutional: Negative for activity change, appetite change, fever and unexpected weight change.    HENT: Negative for congestion, sore throat and trouble swallowing. Eyes: Negative for visual disturbance. Respiratory: Negative for cough and shortness of breath. Cardiovascular: Negative for chest pain, palpitations and leg swelling. Gastrointestinal: Negative for abdominal pain, constipation, diarrhea and vomiting. Genitourinary: Positive for pelvic pain, vaginal bleeding and vaginal pain. Negative for difficulty urinating, dysuria and flank pain. Musculoskeletal: Negative for back pain and neck pain. Skin: Negative for rash. Neurological: Negative for headaches. All other systems reviewed and are negative. Vitals:    02/18/22 1129   BP: (!) 151/103   Pulse: 93   Resp: 18   Temp: 97.7 °F (36.5 °C)   SpO2: 100%   Weight: 123.8 kg (273 lb)   Height: 5' 5\" (1.651 m)            Physical Exam  Vitals and nursing note reviewed. Constitutional:       General: She is in acute distress. Appearance: She is obese. She is not ill-appearing, toxic-appearing or diaphoretic. HENT:      Head: Normocephalic. Mouth/Throat:      Mouth: Mucous membranes are moist.      Pharynx: No posterior oropharyngeal erythema. Cardiovascular:      Rate and Rhythm: Normal rate and regular rhythm. Pulmonary:      Effort: Pulmonary effort is normal.      Breath sounds: Normal breath sounds. Abdominal:      General: Bowel sounds are normal.      Palpations: Abdomen is soft. Musculoskeletal:         General: Normal range of motion. Cervical back: Normal range of motion and neck supple. Right lower leg: No edema. Left lower leg: No edema. Lymphadenopathy:      Cervical: No cervical adenopathy. Skin:     General: Skin is warm and dry. Coloration: Skin is pale. Neurological:      Mental Status: She is alert and oriented to person, place, and time.           MDM       Procedures      Labs Reviewed   URINALYSIS W/MICROSCOPIC - Abnormal; Notable for the following components:       Result Value    Blood SMALL (*)     Bacteria 1+ (*)     All other components within normal limits   CBC WITH AUTOMATED DIFF - Abnormal; Notable for the following components:    HGB 8.6 (*)     HCT 30.6 (*)     MCV 70.3 (*)     MCH 19.8 (*)     MCHC 28.1 (*)     RDW 21.3 (*)     All other components within normal limits   METABOLIC PANEL, COMPREHENSIVE - Abnormal; Notable for the following components:    Chloride 115 (*)     AST (SGOT) 13 (*)     Albumin 3.4 (*)     Globulin 4.7 (*)     A-G Ratio 0.7 (*)     All other components within normal limits   URINE CULTURE HOLD SAMPLE   SAMPLES BEING HELD   LIPASE   HCG URINE, QL. - POC     CT ABD PELV WO CONT    Result Date: 2/18/2022  1. Redemonstrated enlarged myomatous uterus. Redemonstrated 1.6 cm right ovarian cyst, again likely a dominant follicle. 2.  Several incompletely characterized hepatic lesions, some of which were likely seen on 3/22/2008 CTA chest, and may reflect hepatic hemangiomas. Nonemergent liver mass CT or MRI of the abdomen can be obtained as an outpatient for further characterization. 3.  Trent-en-Y gastric bypass    US PELV NON OBS    Result Date: 2/18/2022  1. Unsuccessful transvaginal exam, for reasons detailed above. 2.  Enlarged, myomatous uterus. 3.  Preserved blood flow to bilateral ovaries. 1.7 cm right ovarian cyst, likely a dominant follicle. Patient has been reexamined and is presently pain-free. Recommend close follow-up with OB/GYN for further evaluation and possible surgical treatment of fibroid uterus. Encouraged taking prenatal vitamins daily or a women's multivitamin with iron for chronic anemia. Patient's results and plan of care have been reviewed with her. Patient has verbally conveyed her understanding and agreement of her signs, symptoms, diagnosis, treatment and prognosis and additionally agrees to follow up as recommended or return to the Emergency Room should her condition change prior to follow-up.   Discharge instructions have also been provided to the patient with some educational information regarding her diagnosis as well a list of reasons why she would want to return to the ER prior to her follow-up appointment should her condition change. Phil Villatoro NP

## 2022-02-18 NOTE — ED NOTES
CK Haines reviewed discharge instructions with the patient. The patient verbalized understanding. Patient's family to bring a change of clothes for her to go home in.

## 2022-02-19 LAB — LIPASE SERPL-CCNC: 82 U/L (ref 73–393)

## 2022-02-23 NOTE — PROGRESS NOTES
HISTORY OF PRESENT ILLNESS  Nabil Ovalles is a 46 y.o. female. HPI        Last here vv 10/19/21. Pt is here for routine care. This is an established visit completed with telemedicine was completed with video assist  the patient acknowledges and agrees to this method of visitation micaela      She went to ED 2/18/22 for severe abd cramping and heavy menstrual bleeding    Reviewed US pelv 2/18/22:  1. Unsuccessful transvaginal exam, for reasons detailed above. 2.  Enlarged, myomatous uterus. 3.  Preserved blood flow to bilateral ovaries. 1.7 cm right ovarian cyst, likely  a dominant follicle. Reviewed CT abd 2/18/22:  1. Redemonstrated enlarged myomatous uterus. Redemonstrated 1.6 cm right  ovarian cyst, again likely a dominant follicle. 2.  Several incompletely characterized hepatic lesions, some of which were  likely seen on 3/22/2008 CTA chest, and may reflect hepatic hemangiomas. Nonemergent liver mass CT or MRI of the abdomen can be obtained as an outpatient  for further characterization.   3.  Trent-en-Y gastric bypass   Discussed that if she has additional vaginal bleeding to go to ED  Call gyn about fibroids  She did just go and see her gynecologist after the emergency department evaluation they talked about fibroids and treatment options this is likely contributing to her anemia and she is to follow-up with gynecologist and follow through with the plan    No home BP readings to review    Reminded pt to complete labs  Discussed need to f/u on anemia hemoglobin was around 8 mid to the last time seeing GI doctor get labs she completed none of the above stressed the importance of this her menstrual do play a role here needs a follow-up pathology      She follows with [facility]. Mayra Uriarte at [facility] for COPD and asthma she is on disability for this  Last visit 1/22  She has advair and albuterol inhalers     Pt followed with Dr. Montana Boyd (ortho)     Pt has been dx with LAKE  She does not have a CPAP machine for this needs to follow-up with sleep specialist and plans on following up with a sleep specialist to [address] [place] [address]     She used to take cymbalta for anxiety in the past looks like this was given by a neurologist back around 2017 or 2018 after she had a TIA, she is doing ok without meds currently     She used to take lipitor as well and self d/c this  [place] lipids determined medication needed     Previously provided referral to GI for anemia again and asked her to schedule follow-up of note she also had some liver cysts that need further evaluation     She follows with bariatric surgeon, Dr. Tyson Reilly, previously discussed scheduling f/u as bariatric surgery can contribute to anemia  Reminded     She had TIA and stroke workup in the past  She used to follow with Dr. Rose Turcios (neuro)  Last visit 2/27/18     Recall echo 2017: ER55-60%      PREVENTIVE:  Colonoscopy: Due for anemia work-up  Dexa: not yet needed  Mammo: due at age 38 discussed scheduling  Pap: saw Dr. Koki Haddad at [facility]  Tdap: 9/25/19  Pneumovax: 2011  Shingrix: not yet needed  Flu shot: declines  Eye exam: due   Lipids: ordered  Covid: has not had    Patient Active Problem List    Diagnosis Date Noted    Mixed hyperlipidemia 10/19/2021    Iron deficiency anemia 08/12/2020    Chronic pain of left knee 05/11/2016    Morbid obesity with BMI of 60.0-69.9, adult ([facility] [address] 04/07/2016    Obstructive sleep apnea 02/15/2012    Arthritis 02/15/2012    COPD (chronic obstructive pulmonary disease) ([facility] 09/08/2011    Asthma 09/08/2011     Current Outpatient Medications   Medication Sig Dispense Refill    gabapentin (Neurontin) 300 mg capsule Take 1 Capsule by mouth three (3) times daily.  Max Daily Amount: 900 mg. 20 Capsule 0    albuterol (PROVENTIL VENTOLIN) 2.5 mg /3 mL (0.083 %) nebu 2.5 mg = 3 mL, Inhalation, every 6 hours, 3 Refills, Dispense: 1,080, mL, Pharmacy [id number]      albuterol (VENTOLIN HFA) 90 mcg/actuation inhaler Take 2 Puffs by inhalation every four (4) hours as needed for Wheezing. 1 Inhaler 0    montelukast (SINGULAIR) 10 mg tablet Take 1 Tab by mouth daily. 90 Tab 3    fluticasone-salmeterol (ADVAIR) 500-50 mcg/dose diskus inhaler Take 1 Puff by inhalation every twelve (12) hours. 1 Inhaler 0     Past Surgical History:   Procedure Laterality Date    HX GASTRECTOMY  12/29/2016    lap sleeve gastrectomy by Dr Jorge Chairez  2008    c section    HX GYN  t-14    d and c post miscarriage    HX GYN  12/5/12    HYSTEROSCOPIC REMOVAL OF IUD    HX KNEE ARTHROSCOPY      L KNEE    HX ORTHOPAEDIC      right hand    HX ORTHOPAEDIC      right tendon repair at age 11-16      Lab Results   Component Value Date/Time    WBC 9.0 02/18/2022 01:31 PM    HGB 8.6 (L) 02/18/2022 01:31 PM    HCT 30.6 (L) 02/18/2022 01:31 PM    PLATELET 613 85/84/3769 01:31 PM    MCV 70.3 (L) 02/18/2022 01:31 PM     Lab Results   Component Value Date/Time    Cholesterol, total 144 12/27/2017 06:36 AM    HDL Cholesterol 46 12/27/2017 06:36 AM    LDL, calculated 85.8 12/27/2017 06:36 AM    Triglyceride 61 12/27/2017 06:36 AM    CHOL/HDL Ratio 3.1 12/27/2017 06:36 AM     Lab Results   Component Value Date/Time    GFR est non-AA >60 02/18/2022 01:31 PM    GFR est AA >60 02/18/2022 01:31 PM    Creatinine 0.66 02/18/2022 01:31 PM    BUN 8 02/18/2022 01:31 PM    Sodium 142 02/18/2022 01:31 PM    Potassium 4.1 02/18/2022 01:31 PM    Chloride 115 (H) 02/18/2022 01:31 PM    CO2 22 02/18/2022 01:31 PM    Magnesium 1.9 12/27/2017 06:36 AM    Phosphorus 3.6 12/27/2017 06:36 AM        Review of Systems   Respiratory: Negative for shortness of breath. Cardiovascular: Negative for chest pain. Physical Exam  Constitutional:       General: She is not in acute distress. Appearance: Normal appearance. She is not ill-appearing, toxic-appearing or diaphoretic. HENT:      Head: Normocephalic and atraumatic. Eyes:      General:         Right eye: No discharge.          Left eye: No discharge. Conjunctiva/sclera: Conjunctivae normal.   Pulmonary:      Effort: No respiratory distress. Neurological:      Mental Status: She is alert and oriented to person, place, and time. Mental status is at baseline. Gait: Gait normal.   Psychiatric:         Mood and Affect: Mood normal.         Behavior: Behavior normal.         ASSESSMENT and PLAN    ICD-10-CM ICD-9-CM    1. Iron deficiency anemia, unspecified iron deficiency anemia type         Patient severe anemia on iron supplement    Never followed through with blood work and plan from last visit    Need to see GI for colonoscopy and endoscopy    Has a fibroid uterus which is likely playing a role here in heavy menstrual cycles    She did see her dentist who discussed her fibroids but she did not make a plan of treatment discussed she needs to follow back up with her gynecologist and solidify a treatment plan    Encouraged her to get the blood work that I ordered completed       D50.9 280.9 REFERRAL TO GASTROENTEROLOGY   2. Mixed hyperlipidemia         Previously on Lipitor has not gone her lipids yet encouraged her to go to the lab and we will treat as needed     E78.2 272.2    3. Chronic obstructive pulmonary disease, unspecified COPD type ([facility] [address]       Up-to-date with her pulmonologist continues on Advair stable J44.9 496    4. Morbid obesity with BMI of 60.0-69.9, adult ([facility])  E66.01 278.01    Status post gastric sleeve overdue to see her bariatric surgeon and given progressive anemia needs to schedule follow-up discussed the above with her Z68.44 V85.44    5. Obstructive sleep apnea        Currently not on CPAP needs to see sleep specialist plans on doing so     G47.33 327.23    6. Moderate persistent asthma without complication       Follows with pulmonary [facility] on Advair and albuterol as needed     J45.40 493.90    7. H/O gastric sleeve        See above     Z90.3 V15.29    8.  IFG (impaired fasting glucose)       Check A1c currently diet controlled R73.01 790.21     9 liver cyst--will get ultrasound abdomen for further evaluation sending to GI for anemia may need to evaluate further there as well          Depression screen reviewed and negative     Scribed by Rosa Olvera of [address] [place] [address] [address], as dictated by Dr. Sania Mohr. Current diagnosis and concerns discussed with pt at length. Pt understands risks and benefits or current treatment plan and medications, and accepts the treatment and medication with any possible risks. Pt asks appropriate questions, which were answered. Pt was instructed to call with any concerns or problems. I have reviewed the note documented by the scribe. The services provided are my own. The documentation is accurate     [place], was evaluated through a synchronous (real-time) audio-video encounter. The patient (or guardian if applicable) is aware that this is a billable service. Verbal consent to proceed has been obtained. The visit was conducted pursuant to the emergency declaration under the [facility], [address] [place] [address] authority and the [employer] Act. Patient identification was verified, and a caregiver was present when appropriate. The patient was located at home in a state where the provider was licensed to provide care.

## 2022-02-25 ENCOUNTER — TELEPHONE (OUTPATIENT)
Dept: INTERNAL MEDICINE CLINIC | Age: 42
End: 2022-02-25

## 2022-02-25 ENCOUNTER — VIRTUAL VISIT (OUTPATIENT)
Dept: INTERNAL MEDICINE CLINIC | Age: 42
End: 2022-02-25
Payer: MEDICAID

## 2022-02-25 DIAGNOSIS — E78.2 MIXED HYPERLIPIDEMIA: ICD-10-CM

## 2022-02-25 DIAGNOSIS — J45.40 MODERATE PERSISTENT ASTHMA WITHOUT COMPLICATION: ICD-10-CM

## 2022-02-25 DIAGNOSIS — R73.01 IFG (IMPAIRED FASTING GLUCOSE): ICD-10-CM

## 2022-02-25 DIAGNOSIS — K76.89 LIVER CYST: ICD-10-CM

## 2022-02-25 DIAGNOSIS — G47.33 OBSTRUCTIVE SLEEP APNEA: ICD-10-CM

## 2022-02-25 DIAGNOSIS — J44.9 CHRONIC OBSTRUCTIVE PULMONARY DISEASE, UNSPECIFIED COPD TYPE (HCC): ICD-10-CM

## 2022-02-25 DIAGNOSIS — D50.9 IRON DEFICIENCY ANEMIA, UNSPECIFIED IRON DEFICIENCY ANEMIA TYPE: Primary | ICD-10-CM

## 2022-02-25 DIAGNOSIS — Z90.3 H/O GASTRIC SLEEVE: ICD-10-CM

## 2022-02-25 DIAGNOSIS — E66.01 MORBID OBESITY WITH BMI OF 60.0-69.9, ADULT (HCC): ICD-10-CM

## 2022-02-25 PROCEDURE — 99214 OFFICE O/P EST MOD 30 MIN: CPT | Performed by: INTERNAL MEDICINE

## 2022-02-25 NOTE — TELEPHONE ENCOUNTER
Returned call to patient and advised per nurse that Dr. Travis Griffiths is going to see her at 1:15 PM for her VV.

## 2022-02-25 NOTE — TELEPHONE ENCOUNTER
----- Message from Estephania Harper sent at 2/25/2022 12:39 PM EST -----  Subject: Message to Provider    QUESTIONS  Information for Provider? [facility] received a call from binu Kessler stating   she was returning a call from the office regarding a virtual visit for   today. SE attempted multiple times to connect pt. to the .  ---------------------------------------------------------------------------  --------------  [address]  What is the best way for the office to contact you? OK to leave message on   voicemail  Preferred Call Back Phone Number? [phone]  ---------------------------------------------------------------------------  --------------  SCRIPT ANSWERS  Relationship to Patient?  Self

## 2022-03-07 ENCOUNTER — HOSPITAL ENCOUNTER (OUTPATIENT)
Dept: ULTRASOUND IMAGING | Age: 42
Discharge: HOME | End: 2022-03-07
Attending: INTERNAL MEDICINE
Payer: MEDICAID

## 2022-03-07 DIAGNOSIS — K76.89 LIVER CYST: ICD-10-CM

## 2022-03-07 PROCEDURE — 76700 US EXAM ABDOM COMPLETE: CPT

## 2022-03-07 NOTE — PROGRESS NOTES
Please call patient back about results  Patient with spot on her kidney that may be a cyst but indeterminate at this point lets have her see hepatology clinic to determine need for monitoring and further evaluation

## 2022-03-08 ENCOUNTER — TELEPHONE (OUTPATIENT)
Dept: INTERNAL MEDICINE CLINIC | Age: 42
End: 2022-03-08

## 2022-03-08 DIAGNOSIS — K76.89 LIVER CYST: Primary | ICD-10-CM

## 2022-03-08 NOTE — PROGRESS NOTES
Called, spoke with Pt. Two pt identifiers confirmed. Pt is given US results and recommendations per Dr. Barbara Sultana. Pt given referral to Dr. Nica Manzano and contact number. Pt verbalized understanding of information discussed w/ no further questions at this time.

## 2022-03-14 ENCOUNTER — TELEPHONE (OUTPATIENT)
Dept: INTERNAL MEDICINE CLINIC | Age: 42
End: 2022-03-14

## 2022-03-14 NOTE — TELEPHONE ENCOUNTER
----- Message from Carlyn Kasperranjeet sent at 3/14/2022  9:47 AM EDT -----  Subject: Message to Provider    QUESTIONS  Information for Provider? Pt would like orders for lab work sent to the   [employer] [facility]. Please contact pt to adv and for any questions.   ---------------------------------------------------------------------------  --------------  CALL BACK INFO  What is the best way for the office to contact you? OK to leave message on   voicemail  Preferred Call Back Phone Number? [phone]  ---------------------------------------------------------------------------  --------------  SCRIPT ANSWERS  Relationship to Patient?  Self

## 2022-03-14 NOTE — TELEPHONE ENCOUNTER
Called, spoke to pt  Received two pt identifiers  Pt informed Labs faxed to [facility] w/ confirmation  Pt verbalizes understanding of the instructions and has no further questions at this time.

## 2022-03-18 PROBLEM — D50.9 IRON DEFICIENCY ANEMIA: Status: ACTIVE | Noted: 2020-08-12

## 2022-03-19 PROBLEM — E78.2 MIXED HYPERLIPIDEMIA: Status: ACTIVE | Noted: 2021-10-19

## 2022-04-14 ENCOUNTER — OFFICE VISIT (OUTPATIENT)
Dept: SURGERY | Age: 42
End: 2022-04-14
Payer: MEDICAID

## 2022-04-14 VITALS
SYSTOLIC BLOOD PRESSURE: 126 MMHG | DIASTOLIC BLOOD PRESSURE: 82 MMHG | BODY MASS INDEX: 49.87 KG/M2 | HEART RATE: 68 BPM | OXYGEN SATURATION: 98 % | HEIGHT: 62 IN | WEIGHT: 271 LBS

## 2022-04-14 DIAGNOSIS — E55.9 VITAMIN D DEFICIENCY: ICD-10-CM

## 2022-04-14 DIAGNOSIS — Z90.3 S/P GASTRIC SLEEVE PROCEDURE: ICD-10-CM

## 2022-04-14 DIAGNOSIS — E66.01 MORBID OBESITY (HCC): Primary | ICD-10-CM

## 2022-04-14 DIAGNOSIS — E53.8 VITAMIN B12 DEFICIENCY: ICD-10-CM

## 2022-04-14 DIAGNOSIS — E61.1 IRON DEFICIENCY: ICD-10-CM

## 2022-04-14 PROCEDURE — 99213 OFFICE O/P EST LOW 20 MIN: CPT | Performed by: NURSE PRACTITIONER

## 2022-04-14 NOTE — PROGRESS NOTES
HISTORY OF PRESENT ILLNESS  Carmen Mauro is a 46 y.o. 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 . She has lost a total of 106.5 lbs pounds since surgery. She  has lost 11 lbs since the last ov in 2019. .  Body mass index is 49.57 kg/m². Mehdi Brittle She was seen in the ER in [facility] for vaginal bleeding and abdominal pain. She had an US/CT done which showed fibroids and hepatic lesions. she has a follow-up appointment with liver [place]. no nausea and no vomiting . Denies Acid reflux/heartburn. . Drinking  60 ounces of water daily. 60 protein intake daily. + BM's. Pt is exercising weights and walking. She states that her arms have been itching recently. She has not been seen by dermatologist.  She also states that she has a skin tag from her lower abdomen that continues to grow. She states that she wanted to get back on track with her weight loss. Dietary recall -    Mejia Ann    . Vitamins:  MVI : yes  Calcium : yes  B-Vit 12: yes  Vit D: No        Ms. Xu Weiss has a reminder for a \"due or due soon\" health maintenance. I have asked that she contact her primary care provider for follow-up on this health maintenance. COMORBIDITY     SLEEP APNEA                 NO        GERD  (req.meds)            NO  HYPERLIPIDEMIA            NO  HYPERTENSION              NO         DIABETES                         NO           Current Outpatient Medications:     gabapentin (Neurontin) 300 mg capsule, Take 1 Capsule by mouth three (3) times daily.  Max Daily Amount: 900 mg., Disp: 20 Capsule, Rfl: 0    albuterol (PROVENTIL VENTOLIN) 2.5 mg /3 mL (0.083 %) nebu, 2.5 mg = 3 mL, Inhalation, every 6 hours, 3 Refills, Dispense: 1,080, mL, Pharmacy [id number], Disp: , Rfl:     albuterol (VENTOLIN HFA) 90 mcg/actuation inhaler, Take 2 Puffs by inhalation every four (4) hours as needed for Wheezing., Disp: 1 Inhaler, Rfl: 0    montelukast (SINGULAIR) 10 mg tablet, Take 1 Tab by mouth daily. , Disp: 90 Tab, Rfl: 3    fluticasone-salmeterol (ADVAIR) 500-50 mcg/dose diskus inhaler, Take 1 Puff by inhalation every twelve (12) hours. , Disp: 1 Inhaler, Rfl: 0      Visit Vitals  /82 (BP 1 Location: Left lower arm, BP Patient Position: Sitting, BP Cuff Size: Large adult)   Pulse 68   Ht 5' 2\" (1.575 m)   Wt 271 lb (122.9 kg)   SpO2 98%   BMI 49.57 kg/m²     HPI    Review of Systems   Respiratory: Negative for cough. Cardiovascular: Negative for chest pain. Gastrointestinal: Negative for abdominal pain, heartburn, nausea and vomiting. Skin: Positive for rash. Neurological: Negative for dizziness and headaches. Physical Exam  Constitutional:       Appearance: She is well-developed. HENT:      Mouth/Throat:      Mouth: Mucous membranes are moist.   Cardiovascular:      Rate and Rhythm: Normal rate and regular rhythm. Heart sounds: No murmur heard. No friction rub. No gallop. Pulmonary:      Effort: Pulmonary effort is normal.      Breath sounds: Normal breath sounds. Abdominal:      General: Bowel sounds are normal. There is no distension. Palpations: Abdomen is soft. Tenderness: There is no abdominal tenderness. Skin:     General: Skin is warm and dry. Comments: Raised rash on arms, Skin tag, RLQ   Neurological:      Mental Status: She is alert and oriented to person, place, and time. ASSESSMENT and PLAN  Carmen Mauro is a 46 y.o. 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 . She has lost a total of 106.5 lbs pounds since surgery. She  has lost 11 lbs since the last ov in 2019. .  Body mass index is 49.57 kg/m². Mehdi Brittle She was seen in the ER in [facility] for vaginal bleeding and abdominal pain. She had an US/CT done which showed fibroids and hepatic lesions. she has a follow-up appointment with liver [place]. no nausea and no vomiting . Denies Acid reflux/heartburn. . Drinking  60 ounces of water daily. 60 protein intake daily. + BM's. Pt is exercising weights and walking. She states that her arms have been itching recently. She has not been seen by dermatologist.  She also states that she has a skin tag from her lower abdomen that continues to grow. She states that she wanted to get back on track with her weight loss. Would recommend follow up with dermatology for rash and skin tag removal.  We reviewed vitamins and new bariatric handbook given. Recommend meet with RD. Follow up with OB/GYN for fibroids and hepatologist for liver lesions. Advised patient regard to diet that is high-protein, low-fat, low-sugar, limited carbohydrates. Strive for 50-60 grams of protein daily. If having a snack, foods that are protein or fiber rich. . No eating/drinking together, chew foods well, and portion control. Measure meals. Discussed snacking behavior and to [facility] pay attention to behavioral factor and habits. Drink at least 40-64 ounces of water or non-calorie/non-carbonated beverages daily. Continue vitamin regiment daily. Exercise at least 3 days a week with cardiovascular and strength training. Patient to follow up in 1 year. Advised to call office if any questions/concerns. Check nutrition labs. 22 Minutes spent face to face with patient, >50 % of time spent counseling.

## 2022-04-14 NOTE — PATIENT INSTRUCTIONS
Eating Healthy Foods: Care Instructions  Your Care Instructions     Eating healthy foods can help lower your risk for disease. Healthy food gives you energy and keeps your heart strong, your brain active, your muscles working, and your bones strong. A healthy diet includes a variety of foods from the basic food groups: grains, vegetables, fruits, milk and milk products, and meat and beans. Some people may eat more of their favorite foods from only one food group and, as a result, miss getting the nutrients they need. So, it is important to pay attention not only to what you eat but also to what you are missing from your diet. You can eat a healthy, balanced diet by making a few small changes. Follow-up care is a key part of your treatment and safety. Be sure to make and go to all appointments, and call your doctor if you are having problems. It's also a good idea to know your test results and keep a list of the medicines you take. How can you care for yourself at home? Look at what you eat  · Keep a food diary for a week or two and record everything you eat or drink. Track the number of servings you eat from each food group. · For a balanced diet every day, eat a variety of:  ? 6 or more ounce-equivalents of grains, such as cereals, breads, crackers, rice, or pasta, every day. An ounce-equivalent is 1 slice of bread, 1 cup of ready-to-eat cereal, or ½ cup of cooked rice, cooked pasta, or cooked cereal.  ? 2½ cups of vegetables, especially:  § Dark-green vegetables such as broccoli and spinach. § Orange vegetables such as carrots and sweet potatoes. § Dry beans (such as grayson and kidney beans) and peas (such as lentils). ? 2 cups of fresh, frozen, or canned fruit. A small apple or 1 banana or orange equals 1 cup. ? 3 cups of nonfat or low-fat milk, yogurt, or other milk products. ? 5½ ounces of meat and beans, such as chicken, fish, lean meat, beans, nuts, and seeds.  One egg, 1 tablespoon of peanut butter, ½ ounce nuts or seeds, or ¼ cup of cooked beans equals 1 ounce of meat. · Learn how to read food labels for serving sizes and ingredients. Fast-food and convenience-food meals often contain few or no fruits or vegetables. Make sure you eat some fruits and vegetables to make the meal more nutritious. · Look at your food diary. For each food group, add up what you have eaten and then divide the total by the number of days. This will give you an idea of how much you are eating from each food group. See if you can find some ways to change your diet to make it more healthy. Start small  · Do not try to make dramatic changes to your diet all at once. You might feel that you are missing out on your favorite foods and then be more likely to fail. · Start slowly, and gradually change your habits. Try some of the following:  ? Use whole wheat bread instead of white bread. ? Use nonfat or low-fat milk instead of whole milk. ? Eat brown rice instead of white rice, and eat whole wheat pasta instead of white-flour pasta. ? Try low-fat cheeses and low-fat yogurt. ? Add more fruits and vegetables to meals and have them for snacks. ? Add lettuce, tomato, cucumber, and onion to sandwiches. ? Add fruit to yogurt and cereal.  Enjoy food  · You can still eat your favorite foods. You just may need to eat less of them. If your favorite foods are high in fat, salt, and sugar, limit how often you eat them, but do not cut them out entirely. · Eat a wide variety of foods. Make healthy choices when eating out  · The type of restaurant you choose can help you make healthy choices. Even fast-food chains are now offering more low-fat or healthier choices on the menu. · Choose smaller portions, or take half of your meal home. · When eating out, try:  ? A veggie pizza with a whole wheat crust or grilled chicken (instead of sausage or pepperoni).   ? Pasta with roasted vegetables, grilled chicken, or marinara sauce instead of cream sauce. ? A vegetable wrap or grilled chicken wrap. ? Broiled or poached food instead of fried or breaded items. Make healthy choices easy  · Buy packaged, prewashed, ready-to-eat fresh vegetables and fruits, such as baby carrots, salad mixes, and chopped or shredded broccoli and cauliflower. · Buy packaged, presliced fruits, such as melon or pineapple. · Choose 100% fruit or vegetable juice instead of soda. Limit juice intake to 4 to 6 oz (½ to ¾ cup) a day. · Blend low-fat yogurt, fruit juice, and canned or frozen fruit to make a smoothie for breakfast or a snack. Where can you learn more? Go to http://www.mcgowan.com/  Enter T756 in the search box to learn more about \"Eating Healthy Foods: Care Instructions. \"  Current as of: September 8, 2021               Content Version: 13.2  © 2006-2022 [employer]. Care instructions adapted under license by [employer] (which disclaims liability or warranty for this information). If you have questions about a medical condition or this instruction, always ask your healthcare professional. [place] [address] any warranty or liability for your use of this information.

## 2022-04-14 NOTE — PROGRESS NOTES
1. Have you been to the ER, urgent care clinic since your last visit? Hospitalized since your last visit? Patient states she was seen in the ER in February. 2. Have you seen or consulted any other health care providers outside of the [address] [place] [address] since your last visit? Include any pap smears or colon screening. Patient states she was seen in the ER in February.

## 2022-04-26 ENCOUNTER — TELEPHONE (OUTPATIENT)
Dept: SURGERY | Age: 42
End: 2022-04-26

## 2022-05-11 ENCOUNTER — OFFICE VISIT (OUTPATIENT)
Dept: SURGERY | Age: 42
End: 2022-05-11
Payer: MEDICAID

## 2022-05-11 VITALS
DIASTOLIC BLOOD PRESSURE: 73 MMHG | HEIGHT: 62 IN | TEMPERATURE: 98.4 F | RESPIRATION RATE: 20 BRPM | HEART RATE: 74 BPM | WEIGHT: 268 LBS | OXYGEN SATURATION: 96 % | BODY MASS INDEX: 49.32 KG/M2 | SYSTOLIC BLOOD PRESSURE: 119 MMHG

## 2022-05-11 DIAGNOSIS — L91.8 SKIN TAG: Primary | ICD-10-CM

## 2022-05-11 PROCEDURE — 99213 OFFICE O/P EST LOW 20 MIN: CPT | Performed by: SURGERY

## 2022-05-11 RX ORDER — LANOLIN ALCOHOL/MO/W.PET/CERES
1000 CREAM (GRAM) TOPICAL DAILY
COMMUNITY

## 2022-05-11 RX ORDER — CHOLECALCIFEROL TAB 125 MCG (5000 UNIT) 125 MCG
5000 TAB ORAL DAILY
COMMUNITY

## 2022-05-11 RX ORDER — LANOLIN ALCOHOL/MO/W.PET/CERES
CREAM (GRAM) TOPICAL
COMMUNITY

## 2022-05-11 NOTE — PATIENT INSTRUCTIONS
Skin Tag Removal: Care Instructions  Overview  Skin tags are small, soft pieces of skin that stick out on a stem. They are often the same color as your skin. They often grow on the eyelids, neck, armpit, and groin. Skin tags are not moles and usually do not turn into cancer. You are more likely to get skin tags if you are overweight. They also tend to run in families. Skin tags may be removed if they bother you. Your doctor can remove an unwanted skin tag by simply cutting it off. However, new skin tags often form. Follow-up care is a key part of your treatment and safety. Be sure to make and go to all appointments, and call your doctor if you are having problems. It's also a good idea to know your test results and keep a list of the medicines you take. How can you care for yourself at home? · If clothing irritates a skin tag, cover it with a bandage to prevent rubbing and bleeding. · If you have a skin tag removed, clean the area with soap and water two times a day unless your doctor gives you different instructions. Don't use hydrogen peroxide or alcohol, which can slow healing. ? You may cover the wound with a thin layer of petroleum jelly, such as Vaseline, and a nonstick bandage. · Check all the skin on your body once a month for skin growths or other changes, such as color and feel of the skin. ?  front of a full-length mirror. Look carefully at the front and back of your body. Then look at your right and left sides with your arms raised. ? Bend your elbows and look carefully at your forearms, the back of your upper arms, and your palms. ? Look at your feet, the soles of your feet, and the spaces between your toes. ? Use a hand mirror to look at the back of your legs, the back of your neck, and your back, rear end (buttocks), and genital area. Part the hair on your head to look at your scalp. · If you see a change in a skin growth, contact your doctor. Look for:  ?  A mole that bleeds. ? A fast-growing mole. ? A scaly or crusted growth on the skin. ? A sore that will not heal.  When should you call for help? Call your doctor now or seek immediate medical care if:    · You have signs of infection such as:  ? Pain, warmth, or swelling in your skin. ? Red streaks near a wound in your skin. ? Pus coming from a wound in your skin. ? A fever. Watch closely for changes in your health, and be sure to contact your doctor if:    · You have an area of normal skin that suddenly changes in shape, size, or how it looks.     · You do not get better as expected. Where can you learn more? Go to http://www.gray.com/  Enter B457 in the search box to learn more about \"Skin Tag Removal: Care Instructions. \"  Current as of: November 15, 2021               Content Version: 13.2  © 7641-1994 [employer]. Care instructions adapted under license by [employer] (which disclaims liability or warranty for this information). If you have questions about a medical condition or this instruction, always ask your healthcare professional. [place] [address] any warranty or liability for your use of this information.

## 2022-05-11 NOTE — PROGRESS NOTES
1. Have you been to the ER, urgent care clinic since your last visit? Hospitalized since your last visit? No    2. Have you seen or consulted any other health care providers outside of the [address] [place] [address] since your last visit? Include any pap smears or colon screening.  No

## 2022-05-13 NOTE — PROGRESS NOTES
Subjective:      Marlen Bobby is a 46 y.o. female presents for evaluation of lower abdominal skin tag. She has a history of laparoscopic sleeve gastrectomy (preop BMI 62, current BMI 49). She complains of an enlarging skin growth on her right lower abdomen, which is pruritic, irritated by overlying clothing. She also complains of rash on both triceps folds from redundant skin/subcutaneous tissue, worse with exercise. She denies inflammation/drainage at the skin tag site. Objective:     Visit Vitals  /73   Pulse 74   Temp 98.4 °F (36.9 °C)   Resp 20   Ht 5' 2\" (1.575 m)   Wt 268 lb (121.6 kg)   SpO2 96%   BMI 49.02 kg/m²       General:  alert, cooperative, no distress, appears stated age, morbidly obese   Abdomen:  Not applicable   Skin:   1 cm fleshy, pedunculated skin tag, right lower quadrant abdominal wall, without signs of infection. Assessment:     Lower abdominal skin tag. Lesion is symptomatic. She desires excisional biopsy. Technical aspects of procedure reviewed along with potential risks, postoperative activity restrictions, expected results. She understands and desires to proceed. All questions answered. Plan:     1. Continue current medications. 2.  Continue with current exercise regimen. Recommended formfitting exercise gear to assist with chafing. 3.  We will schedule for excisional biopsy, lower abdominal skin tag.

## 2022-06-07 LAB
ALBUMIN SERPL-MCNC: 4.1 G/DL (ref 3.8–4.8)
ALBUMIN/GLOB SERPL: 1.2 {RATIO} (ref 1.2–2.2)
ALP SERPL-CCNC: 87 IU/L (ref 44–121)
ALT SERPL-CCNC: 13 IU/L (ref 0–32)
AST SERPL-CCNC: 14 IU/L (ref 0–40)
BILIRUB SERPL-MCNC: 0.2 MG/DL (ref 0–1.2)
BUN SERPL-MCNC: 11 MG/DL (ref 6–20)
BUN/CREAT SERPL: 19 (ref 9–23)
CALCIUM SERPL-MCNC: 9.7 MG/DL (ref 8.7–10.2)
CHLORIDE SERPL-SCNC: 106 MMOL/L (ref 96–106)
CHOLEST SERPL-MCNC: 176 MG/DL (ref 100–199)
CO2 SERPL-SCNC: 16 MMOL/L (ref 20–29)
CREAT SERPL-MCNC: 0.57 MG/DL (ref 0.57–1)
EGFR: 118 ML/MIN/1.73
ERYTHROCYTE [DISTWIDTH] IN BLOOD BY AUTOMATED COUNT: 17.4 % (ref 11.7–15.4)
EST. AVERAGE GLUCOSE BLD GHB EST-MCNC: 103 MG/DL
FERRITIN SERPL-MCNC: 2 NG/ML (ref 15–150)
GLOBULIN SER CALC-MCNC: 3.5 G/DL (ref 1.5–4.5)
GLUCOSE SERPL-MCNC: 85 MG/DL (ref 65–99)
HBA1C MFR BLD: 5.2 % (ref 4.8–5.6)
HCT VFR BLD AUTO: 28.6 % (ref 34–46.6)
HCV AB S/CO SERPL IA: 0.2 S/CO RATIO (ref 0–0.9)
HDLC SERPL-MCNC: 51 MG/DL
HGB BLD-MCNC: 7.6 G/DL (ref 11.1–15.9)
LDLC SERPL CALC-MCNC: 111 MG/DL (ref 0–99)
MCH RBC QN AUTO: 18.5 PG (ref 26.6–33)
MCHC RBC AUTO-ENTMCNC: 26.6 G/DL (ref 31.5–35.7)
MCV RBC AUTO: 70 FL (ref 79–97)
PLATELET # BLD AUTO: 289 X10E3/UL (ref 150–450)
POTASSIUM SERPL-SCNC: 4.4 MMOL/L (ref 3.5–5.2)
PROT SERPL-MCNC: 7.6 G/DL (ref 6–8.5)
RBC # BLD AUTO: 4.11 X10E6/UL (ref 3.77–5.28)
SODIUM SERPL-SCNC: 139 MMOL/L (ref 134–144)
TRIGL SERPL-MCNC: 77 MG/DL (ref 0–149)
TSH SERPL DL<=0.005 MIU/L-ACNC: 1.78 UIU/ML (ref 0.45–4.5)
VLDLC SERPL CALC-MCNC: 14 MG/DL (ref 5–40)
WBC # BLD AUTO: 5.5 X10E3/UL (ref 3.4–10.8)

## 2022-06-09 ENCOUNTER — OFFICE VISIT (OUTPATIENT)
Dept: HEMATOLOGY | Age: 42
End: 2022-06-09
Payer: MEDICAID

## 2022-06-09 ENCOUNTER — TELEPHONE (OUTPATIENT)
Dept: HEMATOLOGY | Age: 42
End: 2022-06-09

## 2022-06-09 VITALS
HEIGHT: 62 IN | DIASTOLIC BLOOD PRESSURE: 74 MMHG | RESPIRATION RATE: 17 BRPM | WEIGHT: 273 LBS | SYSTOLIC BLOOD PRESSURE: 141 MMHG | OXYGEN SATURATION: 100 % | BODY MASS INDEX: 50.24 KG/M2 | HEART RATE: 77 BPM | TEMPERATURE: 97.3 F

## 2022-06-09 DIAGNOSIS — R16.0 LIVER MASS: ICD-10-CM

## 2022-06-09 DIAGNOSIS — D50.8 OTHER IRON DEFICIENCY ANEMIA: ICD-10-CM

## 2022-06-09 DIAGNOSIS — K76.89 LIVER CYST: Primary | ICD-10-CM

## 2022-06-09 PROBLEM — Z98.84 H/O GASTRIC BYPASS: Status: ACTIVE | Noted: 2022-06-09

## 2022-06-09 PROCEDURE — 99204 OFFICE O/P NEW MOD 45 MIN: CPT | Performed by: INTERNAL MEDICINE

## 2022-06-09 NOTE — PROGRESS NOTES
Identified pt with two pt identifiers(name and ). Reviewed record in preparation for visit and have obtained necessary documentation. Chief Complaint   Patient presents with    New Patient     Establish care      Vitals:    22 0826   BP: (!) 141/74   Pulse: 77   Resp: 17   Temp: 97.3 °F (36.3 °C)   TempSrc: Temporal   SpO2: 100%   Weight: 273 lb (123.8 kg)   Height: 5' 2\" (1.575 m)   PainSc:   0 - No pain   LMP: 2022       Health Maintenance Review: Patient reminded of \"due or due soon\" health maintenance. I have asked the patient to contact his/her primary care provider (PCP) for follow-up on his/her health maintenance. Coordination of Care Questionnaire:  :   1) Have you been to an emergency room, urgent care, or hospitalized since your last visit? If yes, where when, and reason for visit? no       2. Have seen or consulted any other health care provider since your last visit? If yes, where when, and reason for visit? NO      Patient is accompanied by self I have received verbal consent from Porfirio Curling to discuss any/all medical information while they are present in the room.

## 2022-06-09 NOTE — TELEPHONE ENCOUNTER
[email or web address]  would like this patient to have iron infusions at [facility]. Orders sent over to [facility]. (KF)

## 2022-06-09 NOTE — PROGRESS NOTES
Called, spoke to pt  Received two pt identifiers  Pt informed per Dr. Majo Estrada set her up for an iron infusion d/t low iron anemia, placed for Dr. Carole Arias to sign  Pt informed per Dr. Carole Arias Make sure she has gyn f/U regarding menorrhagia  Pt informed per Dr. Carole Arias Make sure she has set up GI eval  Pt informed per Dr. Carole Arias  Will review rest labs at 6/20 f/U  Pt verbalizes understanding of the instructions and has no further questions at this time.

## 2022-06-09 NOTE — PROGRESS NOTES
[facility], [place], [address], [place], [place]      ARIANA Driscoll, ACNP-BC     Laila Callejas, [facility]   EFRAÍN León, [facility]-BC       Beck [address] 136    at [address], [address] [place] [address], [facility] 22. [phone]    FAX: [address] [place] [address]    at [address], [address], [address]    [phone]    FAX: [phone]       Patient Care Team:  Eugenie Saleh MD as PCP - General (Internal Medicine Physician)  Eugenie Saleh MD as PCP - [facility] Empaneled Provider  Candis Sutherland MD (Obstetrics & Gynecology)  Ronda iPneda MD Neita Du, NP (Nurse Practitioner)      Problem List  Date Reviewed: 6/10/2022          Codes Class Noted    H/O gastric bypass ICD-10-CM: Z98.84  ICD-9-CM: V45.86  6/9/2022        Skin tag, abdominal wall ICD-10-CM: L91.8  ICD-9-CM: 701.9  5/11/2022        Mixed hyperlipidemia ICD-10-CM: E78.2  ICD-9-CM: 272.2  10/19/2021        Iron deficiency anemia ICD-10-CM: D50.9  ICD-9-CM: 280.9  8/12/2020        Morbid obesity with BMI of 60.0-69.9, adult ([facility] 75.) ICD-10-CM: E66.01, Z68.44  ICD-9-CM: 278.01, V85.44  4/7/2016        Obstructive sleep apnea ICD-10-CM: G47.33  ICD-9-CM: 327.23  2/15/2012        Arthritis ICD-10-CM: M19.90  ICD-9-CM: 716.90  2/15/2012        COPD (chronic obstructive pulmonary disease) ([facility] 75.) ICD-10-CM: J44.9  ICD-9-CM: 592  9/8/2011        Asthma ICD-10-CM: J45.909  ICD-9-CM: 493.90  9/8/2011                The clinicians listed above have asked me to see Korina Stewart in consultation regarding a liver mass. All medical records sent by the referring physicians were reviewed including imaging studies     The patient is a 46 y.o.  Black female without any history of previous liver disease. The patient underwent an ultrasound for non-specific abdominal pain in 2/2022. This demonstrated a single hypodense mass in the left lobe measuring 3.9 x 4.4 cm in diameter. A liver ultrasound in 3/2022 demonstrated a normal appearing liver with a hypodense mass in the left lobe measuring 4.3 x 2.9 cm. No liver cancer makers have been ordered to date or the results are not available to me. Imaging studies of the liver suggest a normal liver in addition to the mass. The patient has the following symptoms which could be attributed to the liver disorder:    fatigue,     The patient is not experiencing the following symptoms which are commonly seen in this liver disorder:   pain in the right side over the liver,     The patient completes all daily activities without any functional limitations. ASSESSMENT AND PLAN:  Liver Mass  The patient has a single hypodense mass in the left lobe measuring about 4.4 cm. This is very unlikely to be a malignancy since it does not enhance. It may be a cyst but that should have easily been identified on the [address] [facility]. It may be focal fatty change. Liver transaminases are normal.  ALP is normal.  Liver function is normal.  The platelet count is normal.      Based upon laboratory studies and imaging the patient does not appear to have any liver injury. Serologic studies for HCV was negative. Will perform liver cancer markers including AFP, L3%, , CEA. Will perform imaging of the liver with dynamic MRI 6 months from the initial imaging. This will be in 8/2022. 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 She has been on an oral FE supplement for years with decline in HB form 9.4 to 7.6 gm since 2019. The most recent FE studies were from 6/2022. The serum ferritin is 2    Will administer intravenous iron.       Vaccinations Since the patient does not have a chronic liver disease which can lead to liver injury screening for HAV and HBV is not needed. Routine vaccinations against other bacterial and viral agents can be performed as indicated. Annual flu vaccination should be administered if indicated. ALLERGIES  No Known Allergies    MEDICATIONS  Current Outpatient Medications   Medication Sig    cholecalciferol (VITAMIN D3) (5000 Units/125 mcg) tab tablet Take 5,000 Units by mouth daily.  VITAMIN K2 PO Take  by mouth.  ferrous sulfate (Iron) 325 mg (65 mg iron) tablet Take  by mouth Daily (before breakfast).  cyanocobalamin (Vitamin B-12) 1,000 mcg tablet Take 1,000 mcg by mouth daily.  MAGNESIUM PO Take  by mouth three (3) times daily.  MILK THISTLE PO Take  by mouth.  gabapentin (Neurontin) 300 mg capsule Take 1 Capsule by mouth three (3) times daily. Max Daily Amount: 900 mg.    albuterol (PROVENTIL VENTOLIN) 2.5 mg /3 mL (0.083 %) nebu 2.5 mg = 3 mL, Inhalation, every 6 hours, 3 Refills, Dispense: 1,080, mL, Pharmacy [facility] [id number]    albuterol (VENTOLIN HFA) 90 mcg/actuation inhaler Take 2 Puffs by inhalation every four (4) hours as needed for Wheezing.  montelukast (SINGULAIR) 10 mg tablet Take 1 Tab by mouth daily.  fluticasone-salmeterol (ADVAIR) 500-50 mcg/dose diskus inhaler Take 1 Puff by inhalation every twelve (12) hours. No current facility-administered medications for this visit. SYSTEM REVIEW NOT RELATED TO LIVER DISEASE OR REVIEWED ABOVE:  Constitution systems: Negative for fever, chills, weight gain, weight loss. Eyes: Negative for visual changes. ENT: Negative for sore throat, painful swallowing. Respiratory: Negative for cough, hemoptysis, SOB. Cardiology: Negative for chest pain, palpitations. GI:  Negative for constipation or diarrhea. : Negative for urinary frequency, dysuria, hematuria, nocturia. Skin: Negative for rash.   Hematology: Negative for easy bruising, blood clots. Musculo-skelatal: Negative for back pain, muscle pain, weakness. Neurologic: Negative for headaches, dizziness, vertigo, memory problems not related to HE. Psychology: Negative for anxiety, depression. FAMILY HISTORY:  The father Has/had the following following chronic disease(s): None. The mother Has/had the following chronic disease(s): None. There is no family history of liver disease. SOCIAL HISTORY:  The patient has never been . The patient has 5 children,   The patient has never used tobacco products. The patient has never consumed significant amounts of alcohol. The patient does not work outside the home. PHYSICAL EXAMINATION:  Visit Vitals  BP (!) 141/74 (BP 1 Location: Left upper arm, BP Patient Position: Sitting, BP Cuff Size: Large adult long)   Pulse 77   Temp 97.3 °F (36.3 °C) (Temporal)   Resp 17   Ht 5' 2\" (1.575 m)   Wt 273 lb (123.8 kg)   LMP 06/09/2022 (Exact Date)   SpO2 100%   BMI 49.93 kg/m²       General: No acute distress. Eyes: Sclera anicteric. ENT: No oral lesions. Thyroid normal.  Nodes: No adenopathy. Skin: No spider angiomata. No jaundice. No palmar erythema. Respiratory: Lungs clear to auscultation. Cardiovascular: Regular heart rate. No murmurs. No JVD. Abdomen: Soft non-tender, liver size normal to percussion/palpation. Spleen not palpable. No obvious ascites. Extremities: No edema. No muscle wasting. No gross arthritic changes. Neurologic: Alert and oriented. Cranial nerves grossly intact. No asterixis.       LABORATORY STUDIES:  Liver [place] of [address] Units 6/6/2022 2/18/2022   WBC 3.4 - 10.8 x10E3/uL 5.5 9.0   ANC 1.8 - 8.0 K/UL  6.1   HGB 11.1 - 15.9 g/dL 7.6 (L) 8.6 (L)    - 450 x10E3/uL 289 319   AST 0 - 40 IU/L 14 13 (L)   ALT 0 - 32 IU/L 13 19   Alk Phos 44 - 121 IU/L 87 79   Bili, Total 0.0 - 1.2 mg/dL 0.2 0.3   Albumin 3.8 - 4.8 g/dL 4.1 3.4 (L)   BUN 6 - 20 mg/dL 11 8   Creat 0.57 - 1.00 mg/dL 0.57 0.66   Na 134 - 144 mmol/L 139 142   K 3.5 - 5.2 mmol/L 4.4 4.1   Cl 96 - 106 mmol/L 106 115 (H)   CO2 20 - 29 mmol/L 16 (L) 22   Glucose 65 - 99 mg/dL 85 92     SEROLOGIES:  Serologies Latest Ref Rng & Units 6/6/2022 3/1/2017   Ferritin 15 - 150 ng/mL 2 (L)    Iron % Saturation 15 - 55 %  22     LIVER HISTOLOGY:  Not available or performed    ENDOSCOPIC PROCEDURES:  Not available or performed    RADIOLOGY:  2/2022. CT scan abdomen with IV contrast.  Normal appearing liver. Several hypodense lesions in the liver. The largest is 4.4 x 3.9 cm in the left lobe. 2/2022. Ultrasound of liver. Normal appearing liver. 4.3 x 2.9 cm hypodense mass in left lobe. OTHER TESTING:  Not available or performed    FOLLOW-UP:  All of the issues listed above in the Assessment and Plan were discussed with the patient. All questions were answered. The patient expressed a clear understanding of the above. [facility] 87 in 2 months which should be 1-2 weeks after the next imaging study.         Carolin Ontiveros MD  [facility] of [address] [place] [address], [address] [place] [address] 22.  [phone]  [address]

## 2022-06-09 NOTE — PROGRESS NOTES
Please call patient back about results  Lets set her up for an iron infusion d/t low iron anemia  Make sure she has gyn f/U regarding menorrhagia  Make sure she has set up GI eval  Will review rest labs at 6/20 f/U

## 2022-06-15 NOTE — PROGRESS NOTES
HISTORY OF PRESENT ILLNESS  Jocelyne Lowe is a 46 y.o. female. HPI    Last here vv 2/25/22. Pt is here for routine care. She c/o HA, wonders if this could be related to anemia, discussed that this is possible  Will see how she feels following iron infusion that is scheduled     BP today is 112/71     Wt today is 277 lbs, up 4 lbs x lov   She will be seeing a nutritionist    Reviewed labs     She wonders about how to help with iron absorption  Discussed with bariatric surgery absorption is decreased- nutritionist might have more info on this    Reviewed Us abd 3/07/22:  4.3 cm hypoechoic mass lesion left hepatic lobe remains of  indeterminate etiology as it does not have the typical ultrasound  characteristics of a hemangioma. Correlation with contrast-enhanced abdomen CT  MR with liver protocol is recommended. She has been sent to liver specialist who saw her about this    She is following with Dr. Denis Granda (hepato)  Reviewed note 6/09/22:  Liver Cyst  The patient has a single medium sized cyst in the left lobe. Liver transaminases are normal.  ALP is normal.  Liver function is normal.  The platelet count is normal.    Based upon laboratory studies and imaging the patient does not appear to have any liver injury. Serologic studies for HCV was negative. Patients with polycystic liver disease never develop liver dysfunction and the cysts do not lead to liver failure or cirrhosis. These patients can develop massive hepatomegaly from all the cysts which can be both disfiguring and uncomfortable from severe abdominal distention. Surgical resection of the cysts is not possible since they are throughout the liver and if only a part of the liver is resected it will regrow with cysts. Routine scanning of the liver to assess for a change in size of the cysts is not indicated or helpful in the management. Will perform   Have performed   laboratory testing to monitor liver function and degree of liver injury. This included   BMP,   hepatic panel,   CBC with platelet count,   INR. Will measure the following serologic cancer markers  AFP. CA 19-9. CEA. . PSA. .livapvac  The patient is   asymptomatic   has non-specific RUQ pain which   has severe RUQ pain which   may be   is unlikely to be   secondary to the liver   cyst.    Will perform additional imaging with  ultrasound   triple phase CT scan   dynamic MRI   to better define the liver   cyst    and determine if it is enlarging and requires biopsy, additional monitoring, aspiration or surgical resection. The options are continued monitoring, aspiration the large cyst or surgical resection. Before attempting surgical resection I would aspirate the fluid to see if the symptoms resolve. If they do not, I would not opt for surgery because the symptoms are not likely to resolve. In most cases the non-specific pain is not coming form the cyst.  After considerable discussion the patient   did not feel the symptoms were severe enough to warrant either aspiration or surgery. the plan is to proceed with aspiration of the large cyst.  Screening for Hepatocellular Carcinoma  HCC screening   is not necessary if the patient has no evidence of cirrhosis. has not been not been performed       She follows with Dr. Yari Marley at [facility] for COPD and Jeremías Levine is on disability for this  Last visit 1/22 needs to schedule follow-up  She has advair and albuterol inhalers     Pt followed with Lisa NUR (ortho)  Reviewed note 5/02/22:  Left knee osteoarthritis. Conservative treatment options reviewed. We will put her in physical therapy for 6 weeks  Discussed cortisone injection.  I will have her follow up in 6 weeks to check progress and consider whether that is warranted.       Pt has been dx with LA[facility]  She does not have a CPAP machine for this needs to follow-up with sleep specialist and plans on following up with a sleep specialist to [facility] placed referral today     She used to take cymbalta for anxiety in the past looks like this was given by a neurologist back around 2017 or 2018 after she had a TIA, she is doing ok without meds currently     She used to take lipitor as well and self d/c this lipids minimally elevated  [place]leigh lipids determined medication needed     Previously provided referral to GI for anemia again and asked her to schedule follow-up of note she also had some liver cysts that need further evaluation  Provided referral again today for GI for the anemia needs an endoscopy and colonoscopy for further evaluation she has already seen hepatology see above she also has met with her gynecologist and they are considering an endometrial ablation         She follows with bariatric surgeon, Dr. Kem Krishnamurthy, previously discussed scheduling f/u as bariatric surgery can contribute to anemia  Reviewed note 5/11/22:  1. Continue current medications. 2.  Continue with current exercise regimen. Recommended formfitting exercise gear to assist with chafing.   3.  We will schedule for excisional biopsy, lower abdominal skin tag.     She had TIA and stroke workup in the past  She used to follow with Dr. Fransico Sosa (neuro)  Last visit 2/27/18     Recall echo 2017: ER55-60%      PREVENTIVE:  Colonoscopy: Due for anemia work-up  Dexa: not yet needed  Mammo: due at age 38 discussed scheduling  Pap: saw Dr. Estrella Mcburney at [facility] 5/22- plans on getting endometrial ablation  Tdap: 9/25/19  Pneumovax: 2011  Dennie Gentle yet needed  Flu shot: declines  Eye exam: due   A1c: 6/22 5.2  Lipids: 6/22   Hep C screen: 6/22 negative  Covid: has not had  Patient Active Problem List    Diagnosis Date Noted    H/O gastric bypass 06/09/2022    Skin tag, abdominal wall 05/11/2022    Mixed hyperlipidemia 10/19/2021    Iron deficiency anemia 08/12/2020    Morbid obesity with BMI of 60.0-69.9, adult ([facility] 04/07/2016    Obstructive sleep apnea 02/15/2012    Arthritis 02/15/2012    COPD (chronic obstructive pulmonary disease) ([facility].) 09/08/2011    Asthma 09/08/2011     Current Outpatient Medications   Medication Sig Dispense Refill    cholecalciferol (VITAMIN D3) (5000 Units/125 mcg) tab tablet Take 5,000 Units by mouth daily.  VITAMIN K2 PO Take  by mouth.  ferrous sulfate (Iron) 325 mg (65 mg iron) tablet Take  by mouth Daily (before breakfast).  cyanocobalamin (Vitamin B-12) 1,000 mcg tablet Take 1,000 mcg by mouth daily.  MAGNESIUM PO Take  by mouth three (3) times daily.  MILK THISTLE PO Take  by mouth.  albuterol (PROVENTIL VENTOLIN) 2.5 mg /3 mL (0.083 %) nebu 2.5 mg = 3 mL, Inhalation, every 6 hours, 3 Refills, Dispense: 1,080, mL, Pharmacy [facility] [id number]      albuterol (VENTOLIN HFA) 90 mcg/actuation inhaler Take 2 Puffs by inhalation every four (4) hours as needed for Wheezing. 1 Inhaler 0    montelukast (SINGULAIR) 10 mg tablet Take 1 Tab by mouth daily. 90 Tab 3    fluticasone-salmeterol (ADVAIR) 500-50 mcg/dose diskus inhaler Take 1 Puff by inhalation every twelve (12) hours. 1 Inhaler 0    gabapentin (Neurontin) 300 mg capsule Take 1 Capsule by mouth three (3) times daily. Max Daily Amount: 900 mg.  (Patient not taking: Reported on 6/20/2022) 20 Capsule 0     Facility-Administered Medications Ordered in Other Visits   Medication Dose Route Frequency Provider Last Rate Last Admin    [START ON 6/27/2022] ferric carboxymaltose (INJECTAFER) 750 mg in 0.9% sodium chloride 250 mL, overfill volume 25 mL IVPB  750 mg IntraVENous ONCE MD Diya Vann ON 6/27/2022] 0.9% sodium chloride infusion  25 mL/hr IntraVENous CONTINUOUS Edelmira Alexis MD        ferric carboxymaltose (INJECTAFER) 750 mg in 0.9% sodium chloride 250 mL, overfill volume 25 mL IVPB  750 mg IntraVENous ONCE Edelmira Alexis MD         Past Surgical History:   Procedure Laterality Date    HX GASTRECTOMY  12/29/2016    lap sleeve gastrectomy by Dr Aster Baires HX GYN  2008    c section    HX GYN  t-14    d and c post miscarriage    HX GYN  12/5/12    HYSTEROSCOPIC REMOVAL OF IUD    HX KNEE ARTHROSCOPY      L KNEE    HX ORTHOPAEDIC      right hand    HX ORTHOPAEDIC      right tendon repair at age 11-16      Lab Results   Component Value Date/Time    WBC 5.5 06/06/2022 10:11 AM    HGB 7.6 (L) 06/06/2022 10:11 AM    HCT 28.6 (L) 06/06/2022 10:11 AM    PLATELET 618 37/76/7564 10:11 AM    MCV 70 (L) 06/06/2022 10:11 AM     Lab Results   Component Value Date/Time    Cholesterol, total 176 06/06/2022 10:11 AM    HDL Cholesterol 51 06/06/2022 10:11 AM    LDL, calculated 111 (H) 06/06/2022 10:11 AM    LDL, calculated 85.8 12/27/2017 06:36 AM    Triglyceride 77 06/06/2022 10:11 AM    CHOL/HDL Ratio 3.1 12/27/2017 06:36 AM     Lab Results   Component Value Date/Time    GFR est non-AA >60 02/18/2022 01:31 PM    GFR est AA >60 02/18/2022 01:31 PM    Creatinine 0.57 06/06/2022 10:11 AM    BUN 11 06/06/2022 10:11 AM    Sodium 139 06/06/2022 10:11 AM    Potassium 4.4 06/06/2022 10:11 AM    Chloride 106 06/06/2022 10:11 AM    CO2 16 (L) 06/06/2022 10:11 AM    Magnesium 1.9 12/27/2017 06:36 AM    Phosphorus 3.6 12/27/2017 06:36 AM        Review of Systems   Respiratory: Negative for shortness of breath. Cardiovascular: Negative for chest pain. Neurological: Positive for headaches. Physical Exam  Constitutional:       General: She is not in acute distress. Appearance: Normal appearance. She is not ill-appearing, toxic-appearing or diaphoretic. HENT:      Head: Normocephalic and atraumatic. Right Ear: External ear normal.      Left Ear: External ear normal.   Eyes:      General:         Right eye: No discharge. Left eye: No discharge. Conjunctiva/sclera: Conjunctivae normal.      Pupils: Pupils are equal, round, and reactive to light. Neck:      Vascular: No carotid bruit. Cardiovascular:      Rate and Rhythm: Normal rate and regular rhythm. Heart sounds: No murmur heard. No friction rub. No gallop. Pulmonary:      Effort: No respiratory distress. Breath sounds: Normal breath sounds. No wheezing or rales. Chest:      Chest wall: No tenderness. Abdominal:      General: Abdomen is flat. There is no distension. Palpations: Abdomen is soft. There is no mass. Tenderness: There is abdominal tenderness. There is no guarding or rebound. Hernia: No hernia is present. Musculoskeletal:         General: Normal range of motion. Cervical back: Normal range of motion and neck supple. Right lower leg: No edema. Left lower leg: No edema. Skin:     General: Skin is warm and dry. Neurological:      Mental Status: She is alert and oriented to person, place, and time. Mental status is at baseline. Coordination: Coordination normal.      Gait: Gait normal.   Psychiatric:         Mood and Affect: Mood normal.         Behavior: Behavior normal.         ASSESSMENT and PLAN    ICD-10-CM ICD-9-CM    1. Iron deficiency anemia, unspecified iron deficiency anemia type       Patient with significant anemia with low iron she has an iron infusion scheduled for later today or later this week she is not sure she is going to follow-up on this    She still needs to see gastroenterology for EGD and colonoscopy she has some mild abdominal discomfort which will be evaluated with these procedures as well    Discussed all the above with her    She expressed understanding to plan    Will repeat CBC to ensure stable   D50.9 280.9 REFERRAL TO GASTROENTEROLOGY   2. Morbid obesity with BMI of 60.0-69.9, adult ([facility]  E66.01 278.01    Previously had gastric bypass has been following with bypass clinic and will be meeting with nutritionist in the near future     Z68.44 V85.44    3. Chronic obstructive pulmonary disease, unspecified COPD type (HCC)       Stable on Advair     J44.9 496    4.  Obstructive sleep apnea     Not compliant with CPAP needs to follow-up with sleep specialist    Placed referral G47.33 327.23 SLEEP MEDICINE REFERRAL   5. Mixed hyperlipidemia       Currently diet controlled E78.2 272.2    6. H/O gastric bypass     See above Z98.84 V45.86    7. Physical exam       Discussed need for weight loss mammogram ordered colonoscopy due for GI work-up mammogram due at age 38 which is coming up ordered for her pelvic exam is up-to-date labs reviewed eye exam is still due    Declines flu flu shot had a Pneumovax before Tdap up-to-date declines COVID-vaccine Z00.00 V70.9 JOSÉ MAMMO BI SCREENING INCL CAD        Depression screen reviewed and negative     Scribed by Alessio Tesfaye of [address] [place] [address] [address], as dictated by Dr. Marcie Velasco. Current diagnosis and concerns discussed with pt at length. Pt understands risks and benefits or current treatment plan and medications, and accepts the treatment and medication with any possible risks. Pt asks appropriate questions, which were answered. Pt was instructed to call with any concerns or problems. I have reviewed the note documented by the scribe. The services provided are my own.   The documentation is accurate

## 2022-06-19 RX ORDER — SODIUM CHLORIDE 9 MG/ML
25 INJECTION, SOLUTION INTRAVENOUS CONTINUOUS
Status: DISPENSED | OUTPATIENT
Start: 2022-06-27 | End: 2022-06-27

## 2022-06-20 ENCOUNTER — OFFICE VISIT (OUTPATIENT)
Dept: INTERNAL MEDICINE CLINIC | Age: 42
End: 2022-06-20
Payer: MEDICAID

## 2022-06-20 ENCOUNTER — HOSPITAL ENCOUNTER (OUTPATIENT)
Dept: INFUSION THERAPY | Age: 42
Discharge: HOME | End: 2022-06-20

## 2022-06-20 VITALS
WEIGHT: 277 LBS | RESPIRATION RATE: 16 BRPM | HEIGHT: 64 IN | SYSTOLIC BLOOD PRESSURE: 112 MMHG | OXYGEN SATURATION: 100 % | TEMPERATURE: 98.2 F | BODY MASS INDEX: 47.29 KG/M2 | HEART RATE: 76 BPM | DIASTOLIC BLOOD PRESSURE: 71 MMHG

## 2022-06-20 DIAGNOSIS — D50.9 IRON DEFICIENCY ANEMIA, UNSPECIFIED IRON DEFICIENCY ANEMIA TYPE: Primary | ICD-10-CM

## 2022-06-20 DIAGNOSIS — Z00.00 PHYSICAL EXAM: ICD-10-CM

## 2022-06-20 DIAGNOSIS — E66.01 MORBID OBESITY WITH BMI OF 60.0-69.9, ADULT (HCC): ICD-10-CM

## 2022-06-20 DIAGNOSIS — G47.33 OBSTRUCTIVE SLEEP APNEA: ICD-10-CM

## 2022-06-20 DIAGNOSIS — J44.9 CHRONIC OBSTRUCTIVE PULMONARY DISEASE, UNSPECIFIED COPD TYPE (HCC): ICD-10-CM

## 2022-06-20 DIAGNOSIS — Z98.84 H/O GASTRIC BYPASS: ICD-10-CM

## 2022-06-20 DIAGNOSIS — E78.2 MIXED HYPERLIPIDEMIA: ICD-10-CM

## 2022-06-20 PROCEDURE — 99214 OFFICE O/P EST MOD 30 MIN: CPT | Performed by: INTERNAL MEDICINE

## 2022-06-20 NOTE — PROGRESS NOTES
Chief Complaint   Patient presents with    Follow-up     fibroid     Anemia         Visit Vitals  /71 (BP 1 Location: Right upper arm, BP Patient Position: Sitting, BP Cuff Size: Large adult long)   Pulse 76   Temp 98.2 °F (36.8 °C) (Temporal)   Resp 16   Ht 5' 4\" (1.626 m)   Wt 277 lb (125.6 kg)   SpO2 100%   BMI 47.55 kg/m²           1. \"Have you been to the ER, urgent care clinic since your last visit? Hospitalized since your last visit? \" No    2. \"Have you seen or consulted any other health care providers outside of the [address] [place] [address] since your last visit? \" No     3. For patients aged 39-70: Has the patient had a colonoscopy / FIT/ Cologuard? NA - based on age      If the patient is female:    4. For patients aged 41-77: Has the patient had a mammogram within the past 2 years? No      5. For patients aged 21-65: Has the patient had a pap smear?  Yes - no Care Gap present, Feb 2022, at [facility]

## 2022-06-21 PROBLEM — L91.8 SKIN TAG: Status: RESOLVED | Noted: 2022-05-11 | Resolved: 2022-06-21

## 2022-06-21 PROBLEM — R16.0 LIVER MASS: Status: ACTIVE | Noted: 2022-06-21

## 2022-06-22 ENCOUNTER — TELEPHONE (OUTPATIENT)
Dept: HEMATOLOGY | Age: 42
End: 2022-06-22

## 2022-06-22 NOTE — TELEPHONE ENCOUNTER
[email or web address] Spoke w/patient prior concerning  note. Patient will need MRI and follow up appt within 2 months. Iron infusion was denied--patient will need labs first--labs will be done today per patient. Patient has been on oral iron supplements for a few years. MRI will be done 7/17/22 and follow up appt 8/12/22 arrival time 10am. Left patient detail message on follow up appt date. Pending labs for iron infusion. (ALYSSA)      [email or web address]  has completed his office note, re-draw labs and new orders sent over to [facility] infusion  for Venofer. Iron levels remain low. Pending approval.(ALYSSA)    [email or web address] Iron infusion scheduled. (KF)

## 2022-06-24 ENCOUNTER — TELEPHONE (OUTPATIENT)
Dept: HEMATOLOGY | Age: 42
End: 2022-06-24

## 2022-06-24 NOTE — TELEPHONE ENCOUNTER
Patient insurance denied previous iron transfusion patient stated infed, venofer, and ferrlecit are covered can you please order one of those brands

## 2022-06-24 NOTE — TELEPHONE ENCOUNTER
Faxed order for Venofer to [facility] scheduling at [phone]. Per Eduardo Mansfield, [facility] insurance auth rep, appointment needs to be rescheduled because Venofer requires authorization. Called pt and let her know infusion appointment has been cancelled. I told her hospitals would contact her to schedule Venofer infusions. Spoke with [address] [employer] [place] and reviewed the above. Confirmed order has been received.

## 2022-06-25 LAB
CANCER AG19-9 SERPL-ACNC: <2 U/ML (ref 0–35)
CEA SERPL-MCNC: 1.3 NG/ML (ref 0–4.7)
ERYTHROCYTE [DISTWIDTH] IN BLOOD BY AUTOMATED COUNT: 19.3 % (ref 11.7–15.4)
HCT VFR BLD AUTO: 29.2 % (ref 34–46.6)
HGB BLD-MCNC: 7.6 G/DL (ref 11.1–15.9)
IRON SATN MFR SERPL: 5 % (ref 15–55)
IRON SERPL-MCNC: 22 UG/DL (ref 27–159)
MCH RBC QN AUTO: 18.4 PG (ref 26.6–33)
MCHC RBC AUTO-ENTMCNC: 26 G/DL (ref 31.5–35.7)
MCV RBC AUTO: 71 FL (ref 79–97)
PLATELET # BLD AUTO: 327 X10E3/UL (ref 150–450)
RBC # BLD AUTO: 4.14 X10E6/UL (ref 3.77–5.28)
TIBC SERPL-MCNC: 438 UG/DL (ref 250–450)
UIBC SERPL-MCNC: 416 UG/DL (ref 131–425)
WBC # BLD AUTO: 6.6 X10E3/UL (ref 3.4–10.8)

## 2022-06-25 NOTE — PROGRESS NOTES
Please call patient back about results  Let her know stable anemia, she just had iron infusion for this yesterday.   She needs to see gyn and gi as discussed for eval of the anemia

## 2022-06-27 ENCOUNTER — HOSPITAL ENCOUNTER (OUTPATIENT)
Dept: INFUSION THERAPY | Age: 42
Discharge: HOME | End: 2022-06-27

## 2022-06-27 ENCOUNTER — TELEPHONE (OUTPATIENT)
Dept: INTERNAL MEDICINE CLINIC | Age: 42
End: 2022-06-27

## 2022-06-27 NOTE — TELEPHONE ENCOUNTER
----- Message from Todd Shell sent at 6/27/2022 10:04 AM EDT -----  Subject: Message to Provider    QUESTIONS  Information for Provider? Patient is calling back about test results for   Armand Dixon  ---------------------------------------------------------------------------  --------------  [address]  What is the best way for the office to contact you? OK to leave message on   voicemail, OK to respond with electronic message via [employer] portal (only   for patients who have registered [employer] account)  Preferred Call Back Phone Number?  [phone]  ---------------------------------------------------------------------------  --------------  SCRIPT ANSWERS  undefined None

## 2022-06-27 NOTE — PROGRESS NOTES
Called, spoke to pt  Received two pt identifiers   Pt informed per Dr. Nereida Unger anemia is stable. Pt states did not get infusion as it had to be rescheduled. Advised pt to keep infusion appt   Pt informed per Dr. Nereida Unger f/u  w. Gyn and gi as discussed prior for eval of anemia  Pt verbalizes understanding of the instructions and has no further questions at this time.

## 2022-06-28 LAB
AFP L3 MFR SERPL: NORMAL % (ref 0–9.9)
AFP SERPL-MCNC: 1.1 NG/ML (ref 0–6.4)

## 2022-06-28 RX ORDER — SODIUM CHLORIDE 9 MG/ML
25 INJECTION, SOLUTION INTRAVENOUS CONTINUOUS
Status: DISCONTINUED | OUTPATIENT
Start: 2022-07-01 | End: 2022-07-02 | Stop reason: HOSPADM

## 2022-07-01 ENCOUNTER — HOSPITAL ENCOUNTER (OUTPATIENT)
Dept: INFUSION THERAPY | Age: 42
Discharge: HOME | End: 2022-07-01

## 2022-07-05 RX ORDER — SODIUM CHLORIDE 9 MG/ML
25 INJECTION, SOLUTION INTRAVENOUS CONTINUOUS
Status: DISPENSED | OUTPATIENT
Start: 2022-07-12 | End: 2022-07-12

## 2022-07-11 ENCOUNTER — HOSPITAL ENCOUNTER (OUTPATIENT)
Dept: MRI IMAGING | Age: 42
Discharge: HOME | End: 2022-07-11
Attending: INTERNAL MEDICINE
Payer: MEDICAID

## 2022-07-11 DIAGNOSIS — R16.0 LIVER MASS: ICD-10-CM

## 2022-07-11 PROCEDURE — A9576 INJ PROHANCE MULTIPACK: HCPCS

## 2022-07-11 PROCEDURE — 74011000258 HC RX REV CODE- 258: Performed by: INTERNAL MEDICINE

## 2022-07-11 PROCEDURE — 74011000250 HC RX REV CODE- 250: Performed by: INTERNAL MEDICINE

## 2022-07-11 PROCEDURE — 74183 MRI ABD W/O CNTR FLWD CNTR: CPT

## 2022-07-11 PROCEDURE — 74011250636 HC RX REV CODE- 250/636

## 2022-07-11 RX ORDER — SODIUM CHLORIDE 0.9 % (FLUSH) 0.9 %
10 SYRINGE (ML) INJECTION
Status: COMPLETED | OUTPATIENT
Start: 2022-07-11 | End: 2022-07-11

## 2022-07-11 RX ADMIN — SODIUM CHLORIDE 100 ML: 900 INJECTION, SOLUTION INTRAVENOUS at 10:23

## 2022-07-11 RX ADMIN — SODIUM CHLORIDE, PRESERVATIVE FREE 10 ML: 5 INJECTION INTRAVENOUS at 10:23

## 2022-07-11 RX ADMIN — GADOTERIDOL 20 ML: 279.3 INJECTION, SOLUTION INTRAVENOUS at 10:20

## 2022-07-12 ENCOUNTER — HOSPITAL ENCOUNTER (OUTPATIENT)
Dept: INFUSION THERAPY | Age: 42
Discharge: HOME | End: 2022-07-12
Payer: MEDICAID

## 2022-07-12 VITALS
HEART RATE: 64 BPM | SYSTOLIC BLOOD PRESSURE: 130 MMHG | DIASTOLIC BLOOD PRESSURE: 83 MMHG | RESPIRATION RATE: 18 BRPM | TEMPERATURE: 96.7 F

## 2022-07-12 PROCEDURE — 96365 THER/PROPH/DIAG IV INF INIT: CPT

## 2022-07-12 PROCEDURE — 74011250636 HC RX REV CODE- 250/636: Performed by: INTERNAL MEDICINE

## 2022-07-12 PROCEDURE — 96366 THER/PROPH/DIAG IV INF ADDON: CPT

## 2022-07-12 RX ADMIN — SODIUM CHLORIDE 300 MG: 900 INJECTION, SOLUTION INTRAVENOUS at 09:20

## 2022-07-12 RX ADMIN — SODIUM CHLORIDE 25 ML/HR: 900 INJECTION, SOLUTION INTRAVENOUS at 08:48

## 2022-07-12 NOTE — PROGRESS NOTES
Outpatient Infusion Center Progress Note    0830 Pt admit to [facility] for Venofer 1/3 ambulatory in stable condition. Assessment completed. No new concerns voiced. PIV established in the right hand with positive blood return. IV attached to NS at [facility]  /83   Pulse 64   Temp (!) 96.7 °F (35.9 °C)   Resp 18   Breastfeeding No       Medications:  Medications Administered     0.9% sodium chloride infusion     Admin Date  07/12/2022 Action  New Bag Dose  25 mL/hr Rate  25 mL/hr Route  IntraVENous Administered By  Mony Johnson, DEMARCUS          iron sucrose (VENOFER) 300 mg in 0.9% sodium chloride 250 mL IVPB     Admin Date  07/12/2022 Action  New Bag Dose  300 mg Rate  193.3 mL/hr Route  IntraVENous Administered By  Mony Johnson, DEMARCUS                6426 Pt tolerated treatment well. Pt stayed 30 minutes post infusion without incidence. IV removed. D/c home ambulatory in no distress.  Pt aware of next appointment scheduled for 8/1/22

## 2022-07-25 RX ORDER — SODIUM CHLORIDE 9 MG/ML
25 INJECTION, SOLUTION INTRAVENOUS CONTINUOUS
Status: DISPENSED | OUTPATIENT
Start: 2022-08-01 | End: 2022-08-01

## 2022-08-01 ENCOUNTER — HOSPITAL ENCOUNTER (OUTPATIENT)
Dept: INFUSION THERAPY | Age: 42
Discharge: HOME | End: 2022-08-01

## 2022-08-02 RX ORDER — SODIUM CHLORIDE 9 MG/ML
25 INJECTION, SOLUTION INTRAVENOUS CONTINUOUS
Status: DISCONTINUED | OUTPATIENT
Start: 2022-08-09 | End: 2022-08-10 | Stop reason: HOSPADM

## 2022-08-08 ENCOUNTER — HOSPITAL ENCOUNTER (OUTPATIENT)
Dept: INFUSION THERAPY | Age: 42
Discharge: HOME | End: 2022-08-08
Payer: MEDICAID

## 2022-08-08 VITALS
RESPIRATION RATE: 18 BRPM | SYSTOLIC BLOOD PRESSURE: 132 MMHG | DIASTOLIC BLOOD PRESSURE: 89 MMHG | HEART RATE: 75 BPM | TEMPERATURE: 97.4 F

## 2022-08-08 PROCEDURE — 96366 THER/PROPH/DIAG IV INF ADDON: CPT

## 2022-08-08 PROCEDURE — 96365 THER/PROPH/DIAG IV INF INIT: CPT

## 2022-08-08 PROCEDURE — 74011250636 HC RX REV CODE- 250/636: Performed by: INTERNAL MEDICINE

## 2022-08-08 RX ADMIN — IRON SUCROSE 300 MG: 20 INJECTION, SOLUTION INTRAVENOUS at 15:43

## 2022-08-08 NOTE — PROGRESS NOTES
[facility] Peds/Adult Note                       Date: 2022    Name: Yasmine Mckeon    MRN: [id number]         : 1980    1415 Patient arrives for Venofer 2/3 without acute problems. Please see [facility] for complete assessment and education provided. Prior to treatment, patient was screened for COVID 19. Patient denies any signs or symptoms of COVID. Denies any known physical contact with anyone diagnosed with, or with pending or positive COVID test. Denies a pending or positive COVID test himself. Vital signs stable throughout and prior to discharge. Patient tolerated procedure well and was discharged without incident. Patient is aware of next [facility] appointment on  2022  Appointment card give to the Patient      Ms. Merlos's vitals were reviewed prior to and after treatment. Patient Vitals for the past 12 hrs:   Temp Pulse Resp BP   22 1738 -- 75 18 132/89   22 1721 -- 78 18 (!) 93/49   22 1540 -- 70 18 (!) 100/56   22 1417 97.4 °F (36.3 °C) 77 18 115/72       Medications given:   Medications Administered       iron sucrose (VENOFER) 300 mg in 0.9% sodium chloride 250 mL IVPB + 25 ml overfill       Admin Date  2022 Action  New Bag Dose  300 mg Rate  193.3 mL/hr Route  IntraVENous Administered By  Marcella Laureano RN               Ms. Aicha Angelo tolerated the infusion, and had no complaints. PIV flushed and removed per protocol without any difficulty. Ms. Aicha Angelo was discharged from [place] [address] in stable condition. Discharge Instructions provided to patient, patient verbalized understanding but denied the request for a copy of d/c instructions.      Future Appointments   Date Time Provider Dash Diaz   2022 10:00 AM MD ZEESHAN Calvo BS AMB   2022  3:00 PM [address]   2022  3:00 PM MD Keila VazquezRalf 39 [facility] BS AMB   2022 11:00 AM Bam Zurita MD [facility] BS AMB Maribel Gay RN  August 8, 2022  5:12 PM

## 2022-10-13 ENCOUNTER — OFFICE VISIT (OUTPATIENT)
Dept: HEMATOLOGY | Age: 42
End: 2022-10-13

## 2022-10-13 VITALS
TEMPERATURE: 97 F | SYSTOLIC BLOOD PRESSURE: 135 MMHG | DIASTOLIC BLOOD PRESSURE: 58 MMHG | RESPIRATION RATE: 16 BRPM | OXYGEN SATURATION: 100 % | HEIGHT: 64 IN | WEIGHT: 277 LBS | BODY MASS INDEX: 47.29 KG/M2 | HEART RATE: 80 BPM

## 2022-10-13 DIAGNOSIS — R16.0 LIVER MASS: Primary | ICD-10-CM

## 2022-10-13 PROBLEM — M17.10 OSTEOARTHROSIS, LOCALIZED, PRIMARY, KNEE: Status: ACTIVE | Noted: 2017-09-20

## 2022-10-13 PROCEDURE — 99213 OFFICE O/P EST LOW 20 MIN: CPT | Performed by: INTERNAL MEDICINE

## 2022-10-13 NOTE — PROGRESS NOTES
[facility], MD, FACP, Jenny Delfino Gonsalez, [place]      ARIANA White, [facility]   Albino Sewell, [facility]-   Ria Bella, FNP-DEE Clemons, FNP-C    Abner Cruz, [facility]       Beck Hussein Derek [address]    at [address], [address] [place] [address], [facility] 22.    [phone]    FAX: [address] [place] [address]    at [address], [address]    [phone]    FAX: [phone]       Patient Care Team:  Re Cortés MD as PCP - General (Internal Medicine Physician)  Re Cortés MD as PCP - [facility] Empaneled Provider  Florencia Moore MD (Obstetrics & Gynecology)  Westley Justin, Phys, MD Lugene Crigler, NP (Nurse Practitioner)      Problem List  Date Reviewed: 6/21/2022            Codes Class Noted    Liver mass ICD-10-CM: R16.0  ICD-9-CM: 573.8  6/21/2022        H/O gastric bypass ICD-10-CM: Z98.84  ICD-9-CM: V45.86  6/9/2022        Mixed hyperlipidemia ICD-10-CM: E78.2  ICD-9-CM: 272.2  10/19/2021        Iron deficiency anemia ICD-10-CM: D50.9  ICD-9-CM: 280.9  8/12/2020        Osteoarthrosis, localized, primary, knee ICD-10-CM: M17.10  ICD-9-CM: 715.16  9/20/2017        Obstructive sleep apnea ICD-10-CM: G47.33  ICD-9-CM: 327.23  2/15/2012        Arthritis ICD-10-CM: M19.90  ICD-9-CM: 716.90  2/15/2012        Morbid obesity ([facility] 75.) ICD-10-CM: E66.01  ICD-9-CM: 278.01  9/8/2011        COPD (chronic obstructive pulmonary disease) ([facility] 75.) ICD-10-CM: J44.9  ICD-9-CM: 869  9/8/2011        Asthma ICD-10-CM: J45.909  ICD-9-CM: 493.90  9/8/2011             Clare Parul is being [facility] at [facility] for management of a liver mass which appears to be wither focal nodular hyperplasia or adenoma.   The active problem list, all pertinent past medical history, medications, radiologic findings   and laboratory findings related to the liver disorder were reviewed and discussed with the patient. The patient is a 38 y.o. Black female without any history of previous liver disease. The patient underwent an CT scan without IV contrast for non-specific abdominal pain in 2/2022. This demonstrated a single hypodense mass in the left lobe measuring 3.9 x 4.4 cm in diameter. A liver ultrasound in 3/2022 demonstrated a normal appearing liver with a hypodense mass in the left lobe measuring 4.3 x 2.9 cm. The most recent imaging of the liver was MRI performed in 7/2022. Results suggest the liver is normal.  There are 4 enhancing masses all of which enhance and then become isointense with surrounding liver on delayed imaging. NO central scar is seen in any of the lesions. The lesions are as follows:  3.8 x 4.4 cm, Left lobe segment 2,   3.1 x 3.7 cm, Left lobe, segment 4B,   1.1 x 1.1 cm, Right lobe, segment 7,   0.9 x 1.1 cm, Right lobe, segment 6,     Hemangioma 1.0 cm, Right lobe, segment 7     The patient has the following symptoms which could be attributed to the liver disorder:    fatigue,     The patient is not experiencing the following symptoms which are commonly seen in this liver disorder:   pain in the right side over the liver,     The patient completes all daily activities without any functional limitations. ASSESSMENT AND PLAN:  Liver mass   This is most likely to be Focal Nodular Hyperplasia. The radiologic characteristics of enhancement then washout with the lesion becoming isointense to surrounding liver is consistent with FNF. HOwever, without there being a central scar it is not possible to be 085% certain this is FNH vs adenoma. There is no steatosis in the lesion suggesting adenoma. Focal Nodular Hyperplasia are benign and in general do not increase in size over time.   These lesions are not responsive to estrogens or progesterones. HRT or OCH do not need to be stopped if the patient is taking these medications or these medications can be started if necessary. The diagnostic feature on imaging, a scar in the center of the lesion was not seen. Will therefore repeat imaging this time with triple phase CT to see if we can pick this up. Since I an not at all concerned about this being malignancy will do the CT in 6 months. Hemangioma  One of the liver masses in right lobe, segment 7 had appearance of hemangioma. These lesions are benign and only rarely increase in size over time. Hemangiomas rarely bleed  Hemangiomas of this size do not cause symptoms. 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 She has been on an oral FE supplement for years with decline in HB form 9.4 to 7.6 gm since 2019. The most recent FE studies were from 6/2022. The serum ferritin is 2    She has received 7 FE infusions since I last saw her in 6/2022    Vaccinations   Since the patient does not have a chronic liver disease which can lead to liver injury screening for HAV and HBV is not needed. Routine vaccinations against other bacterial and viral agents can be performed as indicated. Annual flu vaccination should be administered if indicated. ALLERGIES  No Known Allergies    MEDICATIONS  Current Outpatient Medications   Medication Sig    cholecalciferol (VITAMIN D3) (5000 Units/125 mcg) tab tablet Take 5,000 Units by mouth daily. ferrous sulfate 325 mg (65 mg iron) tablet Take  by mouth Daily (before breakfast). cyanocobalamin 1,000 mcg tablet Take 1,000 mcg by mouth daily. MAGNESIUM PO Take  by mouth three (3) times daily. MILK THISTLE PO Take  by mouth.     albuterol (PROVENTIL VENTOLIN) 2.5 mg /3 mL (0.083 %) nebu 2.5 mg = 3 mL, Inhalation, every 6 hours, 3 Refills, Dispense: 1,080, mL, Pharmacy [facility]    albuterol (VENTOLIN HFA) 90 mcg/actuation inhaler Take 2 Puffs by inhalation every four (4) hours as needed for Wheezing.    montelukast (SINGULAIR) 10 mg tablet Take 1 Tab by mouth daily. fluticasone-salmeterol (ADVAIR) 500-50 mcg/dose diskus inhaler Take 1 Puff by inhalation every twelve (12) hours. VITAMIN K2 PO Take  by mouth. Needs to buy more    gabapentin (Neurontin) 300 mg capsule Take 1 Capsule by mouth three (3) times daily. Max Daily Amount: 900 mg. (Patient not taking: No sig reported)     No current facility-administered medications for this visit. SYSTEM REVIEW NOT RELATED TO LIVER DISEASE OR REVIEWED ABOVE:  Constitution systems: Negative for fever, chills, weight gain, weight loss. Eyes: Negative for visual changes. ENT: Negative for sore throat, painful swallowing. Respiratory: Negative for cough, hemoptysis, SOB. Cardiology: Negative for chest pain, palpitations. GI:  Negative for constipation or diarrhea. : Negative for urinary frequency, dysuria, hematuria, nocturia. Skin: Negative for rash. Hematology: Negative for easy bruising, blood clots. Musculo-skelatal: Negative for back pain, muscle pain, weakness. Neurologic: Negative for headaches, dizziness, vertigo, memory problems not related to HE. Psychology: Negative for anxiety, depression. FAMILY HISTORY:  The father Has/had the following following chronic disease(s): None. The mother Has/had the following chronic disease(s): None. There is no family history of liver disease. SOCIAL HISTORY:  The patient has never been . The patient has 5 children,   The patient has never used tobacco products. The patient has never consumed significant amounts of alcohol. The patient does not work outside the home.       PHYSICAL EXAMINATION:  Visit Vitals  BP (!) 135/58 (BP 1 Location: Right upper arm, BP Patient Position: Sitting, BP Cuff Size: Large adult)   Pulse 80   Temp 97 °F (36.1 °C) (Temporal)   Resp 16   Ht 5' 4\" (1.626 m)   Wt 277 lb (125.6 kg)   LMP 09/26/2022   SpO2 100%   BMI 47.55 kg/m²       General: No acute distress. Eyes: Sclera anicteric. ENT: No oral lesions. Thyroid normal.  Nodes: No adenopathy. Skin: No spider angiomata. No jaundice. No palmar erythema. Respiratory: Lungs clear to auscultation. Cardiovascular: Regular heart rate. No murmurs. No JVD. Abdomen: Soft non-tender, liver size normal to percussion/palpation. Spleen not palpable. No obvious ascites. Extremities: No edema. No muscle wasting. No gross arthritic changes. Neurologic: Alert and oriented. Cranial nerves grossly intact. No asterixis. LABORATORY STUDIES:  Liver [place] [address] Units 6/6/2022 2/18/2022   WBC 3.4 - 10.8 x10E3/uL 5.5 9.0   ANC 1.8 - 8.0 K/UL  6.1   HGB 11.1 - 15.9 g/dL 7.6 (L) 8.6 (L)    - 450 x10E3/uL 289 319   AST 0 - 40 IU/L 14 13 (L)   ALT 0 - 32 IU/L 13 19   Alk Phos 44 - 121 IU/L 87 79   Bili, Total 0.0 - 1.2 mg/dL 0.2 0.3   Albumin 3.8 - 4.8 g/dL 4.1 3.4 (L)   BUN 6 - 20 mg/dL 11 8   Creat 0.57 - 1.00 mg/dL 0.57 0.66   Na 134 - 144 mmol/L 139 142   K 3.5 - 5.2 mmol/L 4.4 4.1   Cl 96 - 106 mmol/L 106 115 (H)   CO2 20 - 29 mmol/L 16 (L) 22   Glucose 65 - 99 mg/dL 85 92     SEROLOGIES:  Serologies Latest Ref Rng & Units 6/6/2022 3/1/2017   Ferritin 15 - 150 ng/mL 2 (L)    Iron % Saturation 15 - 55 %  22     LIVER HISTOLOGY:  Not available or performed    ENDOSCOPIC PROCEDURES:  Not available or performed    RADIOLOGY:  2/2022. CT scan abdomen with IV contrast.  Normal appearing liver. Several hypodense lesions in the liver. The largest is 4.4 x 3.9 cm in the left lobe. 2/2022. Ultrasound of liver. Normal appearing liver. 4.3 x 2.9 cm hypodense mass in left lobe. OTHER TESTING:  Not available or performed    FOLLOW-UP:  All of the issues listed above in the Assessment and Plan were discussed with the patient.   All questions were answered. The patient expressed a clear understanding of the above. [facility] 87 in 6 months which should be 1-2 weeks after the next imaging study.         Vickie Leal MD  [address] Maye Isbell [address] [place] [address] [place]  22. [phone]  FAX:  [address] [place] [address]

## 2022-10-13 NOTE — PROGRESS NOTES
Identified pt with two pt identifiers(name and ). Reviewed record in preparation for visit and have obtained necessary documentation. No chief complaint on file. Vitals:    10/13/22 1225   BP: (!) 135/58   Pulse: 80   Resp: 16   Temp: 97 °F (36.1 °C)   TempSrc: Temporal   SpO2: 100%   Weight: 277 lb (125.6 kg)   Height: 5' 4\" (1.626 m)   PainSc:   0 - No pain   LMP: 2022       Health Maintenance Review: Patient reminded of \"due or due soon\" health maintenance. I have asked the patient to contact his/her primary care provider (PCP) for follow-up on his/her health maintenance. Coordination of Care Questionnaire:  :   1) Have you been to an emergency room, urgent care, or hospitalized since your last visit? If yes, where when, and reason for visit? no       2. Have seen or consulted any other health care provider since your last visit? If yes, where when, and reason for visit? YES      Patient is accompanied by self I have received verbal consent from Carmine Mayo to discuss any/all medical information while they are present in the room.

## 2022-10-13 NOTE — Clinical Note
11/3/2022    Patient: Miley Myrick   YOB: 1980   Date of Visit: 10/13/2022     Ezequiel Vines MD  [address]  [address] [facility]  [address]  [facility]  Via [address] [facility] [address]    Dear Ezequiel Vines MD,      [facility]. My notes for this consultation are attached. If you have questions, please do not hesitate to call me. I look forward to following your patient along with you.       Sincerely,    Christine Barragan MD

## 2022-11-21 ENCOUNTER — TELEPHONE (OUTPATIENT)
Dept: INTERNAL MEDICINE CLINIC | Age: 42
End: 2022-11-21

## 2022-11-21 NOTE — TELEPHONE ENCOUNTER
Patient missed her appointment with Dr. Sloane Gentile on 11/21/22. Generic voicemail. Left message for patient to return call to office.

## 2023-01-14 ENCOUNTER — HOSPITAL ENCOUNTER (EMERGENCY)
Age: 43
Discharge: HOME | End: 2023-01-14
Attending: EMERGENCY MEDICINE
Payer: MEDICAID

## 2023-01-14 VITALS
WEIGHT: 273.15 LBS | HEART RATE: 72 BPM | TEMPERATURE: 98.2 F | BODY MASS INDEX: 46.63 KG/M2 | HEIGHT: 64 IN | RESPIRATION RATE: 16 BRPM | OXYGEN SATURATION: 100 % | SYSTOLIC BLOOD PRESSURE: 149 MMHG | DIASTOLIC BLOOD PRESSURE: 85 MMHG

## 2023-01-14 DIAGNOSIS — S61.112A LACERATION OF LEFT THUMB WITHOUT FOREIGN BODY WITH DAMAGE TO NAIL, INITIAL ENCOUNTER: Primary | ICD-10-CM

## 2023-01-14 PROCEDURE — 99283 EMERGENCY DEPT VISIT LOW MDM: CPT

## 2023-01-14 PROCEDURE — 74011000250 HC RX REV CODE- 250: Performed by: PHYSICIAN ASSISTANT

## 2023-01-14 PROCEDURE — 75810000293 HC SIMP/SUPERF WND  RPR

## 2023-01-14 RX ORDER — ACETAMINOPHEN 325 MG/1
650 TABLET ORAL
Qty: 20 TABLET | Refills: 0 | Status: SHIPPED | OUTPATIENT
Start: 2023-01-14

## 2023-01-14 RX ORDER — BUPIVACAINE HYDROCHLORIDE 5 MG/ML
5 INJECTION, SOLUTION EPIDURAL; INTRACAUDAL
Status: COMPLETED | OUTPATIENT
Start: 2023-01-14 | End: 2023-01-14

## 2023-01-14 RX ORDER — LIDOCAINE HYDROCHLORIDE 20 MG/ML
5 INJECTION, SOLUTION EPIDURAL; INFILTRATION; INTRACAUDAL; PERINEURAL ONCE
Status: COMPLETED | OUTPATIENT
Start: 2023-01-14 | End: 2023-01-14

## 2023-01-14 RX ADMIN — BUPIVACAINE HYDROCHLORIDE 25 MG: 5 INJECTION, SOLUTION EPIDURAL; INTRACAUDAL; PERINEURAL at 11:02

## 2023-01-14 RX ADMIN — LIDOCAINE HYDROCHLORIDE 100 MG: 20 INJECTION, SOLUTION EPIDURAL; INFILTRATION; INTRACAUDAL; PERINEURAL at 11:02

## 2023-01-14 NOTE — ED PROVIDER NOTES
[facility] EMERGENCY DEPT  EMERGENCY DEPARTMENT ENCOUNTER       Pt Name: Miley Myrick  MRN: [id number]  [place] 1980  Date of evaluation: 1/14/2023  Provider: LIUDMILA Ahuja   PCP: Aixa Mullen MD  Note Started: 10:50 AM 1/14/23     CHIEF COMPLAINT       Chief Complaint   Patient presents with    Laceration     Cut left thumb cutting onions just pta        HISTORY OF PRESENT ILLNESS: 1 or more elements      History From: Patient  HPI Limitations : None     Miley Myrick is a 38 y.o. female who presents ambulatory with an hour or so of 10 constant, achy tenderness to the tip of the left thumb that is worse with palpation. Patient tells me she was cutting onions earlier and accidentally cut the tip of her left thumb. She is right-hand dominant. She denies any other injuries. Tetanus is up-to-date as of September 25, 2019. She takes no blood thinners. She has been well lately without fever. Nursing Notes were all reviewed and agreed with or any disagreements were addressed in the HPI. REVIEW OF SYSTEMS      Review of Systems   Constitutional:  Negative for fever. Skin:  Positive for wound. Positives and Pertinent negatives as per HPI. PAST HISTORY     Past Medical History:  Past Medical History:   Diagnosis Date    Arthritis     Asthma     [facility]     Morbid obesity [facility])        Past Surgical History:  Past Surgical History:   Procedure Laterality Date    HX GASTRECTOMY  12/29/2016    lap sleeve gastrectomy by Dr Jg Ocampo GYN  2008    c section    HX GYN  t-14    d and c post miscarriage    HX GYN  12/5/12    HYSTEROSCOPIC REMOVAL OF IUD    HX KNEE ARTHROSCOPY      L KNEE    HX ORTHOPAEDIC      right hand    HX ORTHOPAEDIC      right tendon repair at age 11-16       Family History:  Family History   Problem Relation Age of Onset    Diabetes Mother     Hypertension Mother     Elevated Lipids Mother     Asthma Sister     Lung Disease Maternal Grandmother     Anesth Problems Neg Hx        Social History:  Social History     Tobacco Use    Smoking status: Never    Smokeless tobacco: Never   Vaping Use    Vaping Use: Never used   Substance Use Topics    Alcohol use: No    Drug use: Never     Types: Prescription, OTC       Allergies:  No Known Allergies    CURRENT MEDICATIONS      Discharge Medication List as of 1/14/2023 11:46 AM        CONTINUE these medications which have NOT CHANGED    Details   cholecalciferol (VITAMIN D3) (5000 Units/125 mcg) tab tablet Take 5,000 Units by mouth daily. , Historical Med      VITAMIN K2 PO Take  by mouth. Needs to buy more, Historical Med      ferrous sulfate 325 mg (65 mg iron) tablet Take  by mouth Daily (before breakfast). , Historical Med      cyanocobalamin 1,000 mcg tablet Take 1,000 mcg by mouth daily. , Historical Med      MAGNESIUM PO Take  by mouth three (3) times daily. , Historical Med      MILK THISTLE PO Take  by mouth., Historical Med      albuterol (PROVENTIL VENTOLIN) 2.5 mg /3 mL (0.083 %) nebu 2.5 mg = 3 mL, Inhalation, every 6 hours, 3 Refills, Dispense: 1,080, mL, Pharmacy [id number], Historical Med      albuterol (VENTOLIN HFA) 90 mcg/actuation inhaler Take 2 Puffs by inhalation every four (4) hours as needed for Wheezing., Normal, Disp-1 Inhaler, R-0      montelukast (SINGULAIR) 10 mg tablet Take 1 Tab by mouth daily. , Normal, Disp-90 Tab, R-3      fluticasone-salmeterol (ADVAIR) 500-50 mcg/dose diskus inhaler Take 1 Puff by inhalation every twelve (12) hours. , Normal, Disp-1 Inhaler, R-0             PHYSICAL EXAM      ED Triage Vitals [01/14/23 1047]   ED Encounter Vitals Group      BP (!) 149/85      Pulse (Heart Rate) 72      Resp Rate 16      Temp 98.2 °F (36.8 °C)      Temp src       O2 Sat (%) 100 %      Weight 273 lb 2.4 oz      Height         Physical Exam  Vitals and nursing note reviewed. Constitutional:       General: She is not in acute distress. Appearance: She is obese.    HENT:      Head: Normocephalic and atraumatic. Right Ear: External ear normal.      Left Ear: External ear normal.      Nose: Nose normal.   Eyes:      General: Vision grossly intact. Cardiovascular:      Rate and Rhythm: Normal rate. Pulmonary:      Effort: Pulmonary effort is normal.   Musculoskeletal:         General: Normal range of motion. Hands:       Comments:   LEFT THUMB:  There is an ~1.5cm linear laceration of the tip that involves a small portion of the radial aspect of the nail. She is able to fully flex and extend the MCPJ and IPJ. Skin:     Findings: No rash. Neurological:      Mental Status: She is oriented to person, place, and time. She is not disoriented. Psychiatric:         Speech: Speech normal.        DIAGNOSTIC RESULTS   LABS:     No results found for this or any previous visit (from the past 12 hour(s)). Interpretation per the Radiologist below, if available at the time of this note:     No results found. PROCEDURES   Unless otherwise noted below, none  Procedures    Procedure Note - Laceration Repair:  11:45 AM  Procedure by Keith Ruffin PA-C  Complexity: simple   1.5cm linear laceration to the tip of the left thumb involving distal portion of the nail was irrigated copiously with NS under jet lavage, prepped with Hibiclens and draped in a sterile fashion. The area was anesthetized via digital block with 2 mL of a 50-50 mix of 2% lidocaine without epinephrine and 0.5% bupivacaine without epinephrine. The wound was explored with the following results: No foreign bodies found. The wound was repaired with One layer suture closure: Skin Layer:  3 sutures placed, stitch type:simple interrupted, suture: 4-0 nylon. A single stitch was made through the nail, securing the distal fragment to the proximal nail. The wound was closed with good hemostasis and approximation. Sterile dressing applied.   Estimated blood loss: minimal  The procedure took 1-15 minutes, and pt tolerated well.       CRITICAL CARE TIME   None    EMERGENCY DEPARTMENT COURSE and DIFFERENTIAL DIAGNOSIS/MDM   Vitals:    Vitals:    01/14/23 1047   BP: (!) 149/85   Pulse: 72   Resp: 16   Temp: 98.2 °F (36.8 °C)   SpO2: 100%   Weight: 123.9 kg (273 lb 2.4 oz)   Height: 5' 4\" (1.626 m)        Patient was given the following medications:  Medications   lidocaine (PF) (XYLOCAINE) 20 mg/mL (2 %) injection 100 mg (100 mg Peripheral Nerve Block Given 1/14/23 1102)   bupivacaine (PF) (MARCAINE) 0.5 % (5 mg/mL) injection 25 mg (25 mg Peripheral Nerve Block Given 1/14/23 1102)       CONSULTS: (Who and What was discussed)  None    Chronic Conditions: Morbid obesity, asthma, arthritis    Social Determinants affecting Dx or Tx: [place] is enrolled in [place] and does have a primary care provider    Records Reviewed (source and summary of external records): Nursing Notes, Old Medical Records, and immunization records which demonstrates most current Tdap was 09/25/2019. CC/HPI Summary, DDx, ED Course, and Reassessment:     Will update tetanus. Mechanism does not suggest the likelihood of fracture or foreign body: imaging deferred  Laceration repair as above. Wound care instructions. Return precautions. Disposition Considerations (Tests not done, Shared Decision Making, Pt Expectation of Test or Tx.):      FINAL IMPRESSION     1.  Laceration of left thumb without foreign body with damage to nail, initial encounter          DISPOSITION/PLAN   Discharged       PATIENT REFERRED TO:  Follow-up Information       Follow up With Specialties Details Why Contact Info    Ryan Salvador MD Internal Medicine Physician Call  PRIMARY CARE: have stitches removed in 7 - 10 days [address] [facility]  [phone]                DISCHARGE MEDICATIONS:  Discharge Medication List as of 1/14/2023 11:46 AM        START taking these medications    Details   acetaminophen (TYLENOL) 325 mg tablet Take 2 Tablets by mouth every six (6) hours as needed for Pain., Normal, Disp-20 Tablet, R-0           CONTINUE these medications which have NOT CHANGED    Details   cholecalciferol (VITAMIN D3) (5000 Units/125 mcg) tab tablet Take 5,000 Units by mouth daily. , Historical Med      VITAMIN K2 PO Take  by mouth. Needs to buy more, Historical Med      ferrous sulfate 325 mg (65 mg iron) tablet Take  by mouth Daily (before breakfast). , Historical Med      cyanocobalamin 1,000 mcg tablet Take 1,000 mcg by mouth daily. , Historical Med      MAGNESIUM PO Take  by mouth three (3) times daily. , Historical Med      MILK THISTLE PO Take  by mouth., Historical Med      albuterol (PROVENTIL VENTOLIN) 2.5 mg /3 mL (0.083 %) nebu 2.5 mg = 3 mL, Inhalation, every 6 hours, 3 Refills, Dispense: 1,080, mL, Pharmacy [id number], Historical Med      albuterol (VENTOLIN HFA) 90 mcg/actuation inhaler Take 2 Puffs by inhalation every four (4) hours as needed for Wheezing., Normal, Disp-1 Inhaler, R-0      montelukast (SINGULAIR) 10 mg tablet Take 1 Tab by mouth daily. , Normal, Disp-90 Tab, R-3      fluticasone-salmeterol (ADVAIR) 500-50 mcg/dose diskus inhaler Take 1 Puff by inhalation every twelve (12) hours. , Normal, Disp-1 Inhaler, R-0               DISCONTINUED MEDICATIONS:  Discharge Medication List as of 1/14/2023 11:46 AM          Shared Not Shared IZZY: I have seen and evaluated the patient. My supervision physician was available for consultation. I am the Primary Clinician of Record. LIUDMILA Hernandez (electronically signed)    (Please note that parts of this dictation were completed with voice recognition software. Quite often unanticipated grammatical, syntax, homophones, and other interpretive errors are inadvertently transcribed by the computer software. Please disregards these errors.  Please excuse any errors that have escaped final proofreading.)

## 2023-01-30 ENCOUNTER — CLINICAL SUPPORT (OUTPATIENT)
Dept: INTERNAL MEDICINE CLINIC | Age: 43
End: 2023-01-30
Payer: MEDICAID

## 2023-01-30 DIAGNOSIS — S61.112A LACERATION OF LEFT THUMB WITH DAMAGE TO NAIL, FOREIGN BODY PRESENCE UNSPECIFIED, INITIAL ENCOUNTER: Primary | ICD-10-CM

## 2023-01-30 PROCEDURE — S0630 REMOVAL OF SUTURES: HCPCS | Performed by: INTERNAL MEDICINE

## 2023-01-30 NOTE — PROGRESS NOTES
GABBIE per Dr. Prashanth Flores for stitch removal 3 Sutures removed from left thumb with one stitch in thumbnail, edges are approximated without redness or drainage. Patient tolerated well but was too nervous and anxious about procedure for vital signs. Patient indicates thumb was tender to touch.

## 2023-02-08 ENCOUNTER — TELEPHONE (OUTPATIENT)
Dept: SURGERY | Age: 43
End: 2023-02-08

## 2023-04-17 ENCOUNTER — OFFICE VISIT (OUTPATIENT)
Dept: HEMATOLOGY | Age: 43
End: 2023-04-17
Payer: MEDICAID

## 2023-04-17 VITALS
HEART RATE: 80 BPM | TEMPERATURE: 97.6 F | OXYGEN SATURATION: 99 % | RESPIRATION RATE: 17 BRPM | BODY MASS INDEX: 46.95 KG/M2 | SYSTOLIC BLOOD PRESSURE: 126 MMHG | HEIGHT: 64 IN | DIASTOLIC BLOOD PRESSURE: 74 MMHG | WEIGHT: 275 LBS

## 2023-04-17 DIAGNOSIS — R16.0 LIVER MASS: Primary | ICD-10-CM

## 2023-04-17 PROBLEM — K21.9 GASTROESOPHAGEAL REFLUX DISEASE WITHOUT ESOPHAGITIS: Status: ACTIVE | Noted: 2023-04-11

## 2023-04-17 PROBLEM — J45.20 MILD INTERMITTENT ASTHMA: Status: ACTIVE | Noted: 2023-04-11

## 2023-04-17 PROBLEM — J30.9 ALLERGIC RHINITIS: Status: ACTIVE | Noted: 2023-04-11

## 2023-04-17 PROCEDURE — 99213 OFFICE O/P EST LOW 20 MIN: CPT | Performed by: NURSE PRACTITIONER

## 2023-04-17 RX ORDER — FLUTICASONE PROPIONATE 50 MCG
2 SPRAY, SUSPENSION (ML) NASAL DAILY
COMMUNITY
Start: 2023-04-11 | End: 2024-04-10

## 2023-04-17 NOTE — PROGRESS NOTES
[facility], MD, FACP, [facility] Leilani Wallace, [place]      ARIANA Frazier, PCNP-BC   Calli Monge, [facility]   Mary Rojas, FNP-C   Madison Sanchez, FNP-C    Mary Raul, AGPCNP-BC       Beck Maganaroger [address]    at [address], [address] [place] [address], [facility].    [phone]    FAX: [address] [place] [address]    at [address], [address]    [phone]    FAX: [phone]       Patient Care Team:  Savana Ornelas MD as PCP - General (Internal Medicine Physician)  Savana Ornelas MD as PCP - [facility] Empaneled Provider  Mckenzie Mane MD (Obstetrics & Gynecology)  Ronda Brown MD Edmon Akin, NP (Nurse Practitioner)  Scarlett Rashid NP (Nurse Practitioner)      Problem List  Date Reviewed: 4/17/2023            Codes Class Noted    Mild intermittent asthma ICD-10-CM: J45.20  ICD-9-CM: 493.90  4/11/2023        Gastroesophageal reflux disease without esophagitis ICD-10-CM: K21.9  ICD-9-CM: 530.81  4/11/2023        Allergic rhinitis ICD-10-CM: J30.9  ICD-9-CM: 477.9  4/11/2023        Liver mass ICD-10-CM: R16.0  ICD-9-CM: 573.8  6/21/2022        H/O gastric bypass ICD-10-CM: Z98.84  ICD-9-CM: V45.86  6/9/2022        Mixed hyperlipidemia ICD-10-CM: E78.2  ICD-9-CM: 272.2  10/19/2021        Iron deficiency anemia ICD-10-CM: D50.9  ICD-9-CM: 280.9  8/12/2020        Osteoarthrosis, localized, primary, knee ICD-10-CM: M17.10  ICD-9-CM: 715.16  9/20/2017        Obstructive sleep apnea ICD-10-CM: G47.33  ICD-9-CM: 327.23  2/15/2012        Arthritis ICD-10-CM: M19.90  ICD-9-CM: 716.90  2/15/2012        Morbid obesity ([facility] ICD-10-CM: E66.01  ICD-9-CM: 278.01  9/8/2011        COPD (chronic obstructive pulmonary disease) ([facility].) ICD-10-CM: J44.9  ICD-9-CM: 658  9/8/2011        Asthma ICD-10-CM: J45.909  ICD-9-CM: 493.90  9/8/2011           Nina Hurst is being seen at [facility] for management of a liver mass which appears to be either focal nodular hyperplasia or adenoma. The active problem list, all pertinent past medical history, medications, radiologic findings and laboratory findings related to the liver disorder were reviewed and discussed with the patient. The patient is a 38 y.o. Black female without any history of previous liver disease. The patient underwent an CT scan without IV contrast for non-specific abdominal pain in 2/2022. This demonstrated a single hypodense mass in the left lobe measuring 3.9 x 4.4 cm in diameter. A liver ultrasound in 3/2022 demonstrated a normal appearing liver with a hypodense mass in the left lobe measuring 4.3 x 2.9 cm. The most recent imaging of the liver was MRI performed in 7/2022. Results suggest the liver is normal.  There are 4 enhancing masses all of which enhance and then become isointense with surrounding liver on delayed imaging. NO central scar is seen in any of the lesions. The lesions are as follows:  3.8 x 4.4 cm, Left lobe segment 2,   3.1 x 3.7 cm, Left lobe, segment 4B,   1.1 x 1.1 cm, Right lobe, segment 7,   0.9 x 1.1 cm, Right lobe, segment 6. Hemangioma 1.0 cm, Right lobe, segment 7. The patient has the following symptoms which could be attributed to the liver disorder:    Fatigue. The patient is not experiencing the following symptoms which are commonly seen in this liver disorder:   pain in the right side over the liver,     The patient completes all daily activities without any functional limitations. Since the last office visit the patient reports she has begun working out and is experiencing improvement in energy. She is undergoing evaluation for potential myomectomy.  She reports intermittent discomfort secondary to fibroids. ASSESSMENT AND PLAN:  Liver mass   This is most likely to be Focal Nodular Hyperplasia. The radiologic characteristics of enhancement then washout with the lesion becoming isointense to surrounding liver is consistent with FNF. However, without there being a central scar it is not possible to be 756% certain this is FNH vs adenoma. There is no steatosis in the lesion suggesting adenoma. Focal Nodular Hyperplasia are benign and in general do not increase in size over time. These lesions are not responsive to estrogens or progesterones. HRT or OCH do not need to be stopped if the patient is taking these medications or these medications can be started if necessary. The diagnostic feature on imaging, a scar in the center of the lesion was not seen. Will therefore repeat imaging this time with triple phase CT to see if we can pick this up. Concern for malignancy is know. Triple phase CT ordered; patient to schedule. Hemangioma  One of the liver masses in right lobe, segment 7 had appearance of hemangioma. These lesions are benign and only rarely increase in size over time. Hemangiomas rarely bleed  Hemangiomas of this size do not cause symptoms. 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 She has been on an oral FE supplement for years with decline in HB form 9.4 to 7.6 gm since 2019. The most recent FE studies were from 6/2022. The serum ferritin is 2    She has received 7 FE infusions since 6/2022    Vaccinations   Since the patient does not have a chronic liver disease which can lead to liver injury screening for HAV and HBV is not needed. Routine vaccinations against other bacterial and viral agents can be performed as indicated. Annual flu vaccination should be administered if indicated.       ALLERGIES  No Known Allergies    MEDICATIONS  Current Outpatient Medications   Medication Sig fluticasone propionate (FLONASE) 50 mcg/actuation nasal spray 2 Sprays daily. cholecalciferol (VITAMIN D3) (5000 Units/125 mcg) tab tablet Take 1 Tablet by mouth daily. ferrous sulfate 325 mg (65 mg iron) tablet Take  by mouth Daily (before breakfast). cyanocobalamin 1,000 mcg tablet Take 1 Tablet by mouth daily. MAGNESIUM PO Take  by mouth three (3) times daily. albuterol (PROVENTIL VENTOLIN) 2.5 mg /3 mL (0.083 %) nebu 2.5 mg = 3 mL, Inhalation, every 6 hours, 3 Refills, Dispense: 1,080, mL, Pharmacy [facility]    albuterol (VENTOLIN HFA) 90 mcg/actuation inhaler Take 2 Puffs by inhalation every four (4) hours as needed for Wheezing.    montelukast (SINGULAIR) 10 mg tablet Take 1 Tab by mouth daily. fluticasone-salmeterol (ADVAIR) 500-50 mcg/dose diskus inhaler Take 1 Puff by inhalation every twelve (12) hours. No current facility-administered medications for this visit. SYSTEM REVIEW NOT RELATED TO LIVER DISEASE OR REVIEWED ABOVE:  Constitution systems: Negative for fever, chills, weight gain, weight loss. Eyes: Negative for visual changes. ENT: Negative for sore throat, painful swallowing. Respiratory: Negative for cough, hemoptysis, SOB. Cardiology: Negative for chest pain, palpitations. GI:  Negative for constipation or diarrhea. : Negative for urinary frequency, dysuria, hematuria, nocturia. Skin: Negative for rash. Hematology: Negative for easy bruising, blood clots. Musculo-skelatal: Negative for back pain, muscle pain, weakness. Neurologic: Negative for headaches, dizziness, vertigo, memory problems not related to HE. Psychology: Negative for anxiety, depression. FAMILY HISTORY:  The father Has/had the following following chronic disease(s): None. The mother Has/had the following chronic disease(s): None. There is no family history of liver disease. SOCIAL HISTORY:  The patient has never been .     The patient has 5 children,   The patient has never used tobacco products. The patient has never consumed significant amounts of alcohol. The patient does not work outside the home. PHYSICAL EXAMINATION:  Visit Vitals  /74 (BP 1 Location: Left lower arm, BP Patient Position: Sitting, BP Cuff Size: Adult long)   Pulse 80   Temp 97.6 °F (36.4 °C) (Temporal)   Resp 17   Ht 5' 4\" (1.626 m)   Wt 275 lb (124.7 kg)   LMP 04/04/2023   SpO2 99%   BMI 47.20 kg/m²       General: No acute distress. Eyes: Sclera anicteric. ENT: No oral lesions. Thyroid normal.  Nodes: No adenopathy. Skin: No spider angiomata. No jaundice. No palmar erythema. Respiratory: Lungs clear to auscultation. Cardiovascular: Regular heart rate. No murmurs. No JVD. Abdomen: Soft non-tender, liver size normal to percussion/palpation. Spleen not palpable. No obvious ascites. Extremities: No edema. No muscle wasting. No gross arthritic changes. Neurologic: Alert and oriented. Cranial nerves grossly intact. No asterixis. LABORATORY STUDIES:  Additional lab values drawn at today's office visit are pending at the time of documentation.     Liver [place] of [address] Units 6/6/2022 2/18/2022   WBC 3.4 - 10.8 x10E3/uL 5.5 9.0   ANC 1.8 - 8.0 K/UL  6.1   HGB 11.1 - 15.9 g/dL 7.6 (L) 8.6 (L)    - 450 x10E3/uL 289 319   AST 0 - 40 IU/L 14 13 (L)   ALT 0 - 32 IU/L 13 19   Alk Phos 44 - 121 IU/L 87 79   Bili, Total 0.0 - 1.2 mg/dL 0.2 0.3   Albumin 3.8 - 4.8 g/dL 4.1 3.4 (L)   BUN 6 - 20 mg/dL 11 8   Creat 0.57 - 1.00 mg/dL 0.57 0.66   Na 134 - 144 mmol/L 139 142   K 3.5 - 5.2 mmol/L 4.4 4.1   Cl 96 - 106 mmol/L 106 115 (H)   CO2 20 - 29 mmol/L 16 (L) 22   Glucose 65 - 99 mg/dL 85 92     Cancer Screening Latest Ref Rng & Units 6/24/2022   AFP, Serum 0.0 - 6.4 ng/mL 1.1   AFP-L3% 0.0 - 9.9 % Comment   CA 19-9 0 - 35 U/mL <2   CEA 0.0 - 4.7 ng/mL 1.3       SEROLOGIES:  Serologies Latest Ref Rng & Units 6/6/2022 3/1/2017 Ferritin 15 - 150 ng/mL 2 (L)    Iron % Saturation 15 - 55 %  22     LIVER HISTOLOGY:  Not available or performed    ENDOSCOPIC PROCEDURES:  Not available or performed    RADIOLOGY:  2/2022. CT scan abdomen with IV contrast.  Normal appearing liver. Several hypodense lesions in the liver. The largest is 4.4 x 3.9 cm in the left lobe. 3/2022. Ultrasound of liver. Normal appearing liver. 4.3 x 2.9 cm hypodense mass in left lobe. 7/2022. Dynamic MRI liver. There are 4 circumscribed arterial phase enhancing lesions which show isointensity on other sequences and near isoenhancing to adjacent liver on more  delayed sequences measuring 3.8 x 4.4 cm in segment 2, 3.1 x 3.7 cm in segment  4A/B, 1.1 x 1.1 cm in segment 7, and 0.9 x 1.1 cm in segment 6. There is no  evident central scar, washout, or capsule and in the of these lesions. A 10 mm  hemangioma is seen in segment 7 below the dome, no other focal hepatic lesions  are seen and there is normal hepatic morphology and normal enhancement of  hepatic vascular structures. OTHER TESTING:  Not available or performed    FOLLOW-UP:  All of the issues listed above in the Assessment and Plan were discussed with the patient. All questions were answered. The patient expressed a clear understanding of the above. If next imaging study demonstrates [address], no additional follow up is needed at [facility]. If lesions are found to be adenomas, we will schedule follow up for routine surveillance. Patient in agreement with the plan.        Dalila Chau, FRANCES-AG  [facility] of [address] [address] Marty Vásquez, [address] [place] [address] 22.  [phone]  [address] [place] [address]

## 2023-04-17 NOTE — PROGRESS NOTES
Identified pt with two pt identifiers(name and ). Reviewed record in preparation for visit and have obtained necessary documentation. Chief Complaint   Patient presents with    Other     Liver mass   6 mo f/u      Vitals:    23 1056   BP: 126/74   Pulse: 80   Resp: 17   Temp: 97.6 °F (36.4 °C)   TempSrc: Temporal   SpO2: 99%   Weight: 275 lb (124.7 kg)   Height: 5' 4\" (1.626 m)   PainSc:   0 - No pain   LMP: 2023       Health Maintenance Review: Patient reminded of \"due or due soon\" health maintenance. I have asked the patient to contact his/her primary care provider (PCP) for follow-up on his/her health maintenance. Coordination of Care Questionnaire:  :   1) Have you been to an emergency room, urgent care, or hospitalized since your last visit? If yes, where when, and reason for visit? Yes, [facility] left thumb required stitches      2. Have seen or consulted other health care provider since your last visit? If yes, where when, and reason for visit? YES, Pulmonologist [facility] follow up      Patient is accompanied by self I have received verbal consent from Wale Addison to discuss any/all medical information while they are present in the room.

## 2023-04-27 LAB
ALBUMIN SERPL-MCNC: 3.6 G/DL (ref 3.5–5)
ALBUMIN/GLOB SERPL: 0.9 (ref 1.1–2.2)
ALP SERPL-CCNC: 80 U/L (ref 45–117)
ALT SERPL-CCNC: 18 U/L (ref 12–78)
ANION GAP SERPL CALC-SCNC: 5 MMOL/L (ref 5–15)
AST SERPL-CCNC: 13 U/L (ref 15–37)
BASOPHILS # BLD: 0.1 K/UL (ref 0–0.1)
BASOPHILS NFR BLD: 1 % (ref 0–1)
BILIRUB DIRECT SERPL-MCNC: <0.1 MG/DL (ref 0–0.2)
BILIRUB SERPL-MCNC: 0.3 MG/DL (ref 0.2–1)
BUN SERPL-MCNC: 12 MG/DL (ref 6–20)
BUN/CREAT SERPL: 21 (ref 12–20)
CALCIUM SERPL-MCNC: 10.1 MG/DL (ref 8.5–10.1)
CHLORIDE SERPL-SCNC: 109 MMOL/L (ref 97–108)
CO2 SERPL-SCNC: 24 MMOL/L (ref 21–32)
CREAT SERPL-MCNC: 0.56 MG/DL (ref 0.55–1.02)
DIFFERENTIAL METHOD BLD: ABNORMAL
EOSINOPHIL # BLD: 0.1 K/UL (ref 0–0.4)
EOSINOPHIL NFR BLD: 1 % (ref 0–7)
ERYTHROCYTE [DISTWIDTH] IN BLOOD BY AUTOMATED COUNT: 16.8 % (ref 11.5–14.5)
GLOBULIN SER CALC-MCNC: 4.1 G/DL (ref 2–4)
GLUCOSE SERPL-MCNC: 86 MG/DL (ref 65–100)
HCT VFR BLD AUTO: 30.5 % (ref 35–47)
HGB BLD-MCNC: 8.7 G/DL (ref 11.5–16)
IMM GRANULOCYTES # BLD AUTO: 0 K/UL (ref 0–0.04)
IMM GRANULOCYTES NFR BLD AUTO: 0 % (ref 0–0.5)
LYMPHOCYTES # BLD: 2.3 K/UL (ref 0.8–3.5)
LYMPHOCYTES NFR BLD: 41 % (ref 12–49)
MCH RBC QN AUTO: 21.2 PG (ref 26–34)
MCHC RBC AUTO-ENTMCNC: 28.5 G/DL (ref 30–36.5)
MCV RBC AUTO: 74.4 FL (ref 80–99)
MONOCYTES # BLD: 0.4 K/UL (ref 0–1)
MONOCYTES NFR BLD: 8 % (ref 5–13)
NEUTS SEG # BLD: 2.6 K/UL (ref 1.8–8)
NEUTS SEG NFR BLD: 49 % (ref 32–75)
NRBC # BLD: 0 K/UL (ref 0–0.01)
NRBC BLD-RTO: 0 PER 100 WBC
PLATELET # BLD AUTO: 295 K/UL (ref 150–400)
PMV BLD AUTO: 11.7 FL (ref 8.9–12.9)
POTASSIUM SERPL-SCNC: 4.7 MMOL/L (ref 3.5–5.1)
PROT SERPL-MCNC: 7.7 G/DL (ref 6.4–8.2)
RBC # BLD AUTO: 4.1 M/UL (ref 3.8–5.2)
RBC MORPH BLD: ABNORMAL
SODIUM SERPL-SCNC: 138 MMOL/L (ref 136–145)
WBC # BLD AUTO: 5.5 K/UL (ref 3.6–11)

## 2023-04-29 DIAGNOSIS — K76.9 LIVER LESION: Primary | ICD-10-CM

## 2023-09-02 ENCOUNTER — HOSPITAL ENCOUNTER (OUTPATIENT)
Facility: HOSPITAL | Age: 43
Setting detail: OBSERVATION
Discharge: HOME | End: 2023-09-04
Attending: STUDENT IN AN ORGANIZED HEALTH CARE EDUCATION/TRAINING PROGRAM | Admitting: HOSPITALIST
Payer: COMMERCIAL

## 2023-09-02 ENCOUNTER — APPOINTMENT (OUTPATIENT)
Facility: HOSPITAL | Age: 43
End: 2023-09-02
Payer: COMMERCIAL

## 2023-09-02 DIAGNOSIS — D64.9 SEVERE ANEMIA: ICD-10-CM

## 2023-09-02 DIAGNOSIS — D25.1 INTRAMURAL LEIOMYOMA OF UTERUS: Primary | ICD-10-CM

## 2023-09-02 DIAGNOSIS — R07.89 OTHER CHEST PAIN: ICD-10-CM

## 2023-09-02 DIAGNOSIS — D50.0 IRON DEFICIENCY ANEMIA DUE TO CHRONIC BLOOD LOSS: ICD-10-CM

## 2023-09-02 LAB
ALBUMIN SERPL-MCNC: 3.3 G/DL (ref 3.5–5)
ALBUMIN/GLOB SERPL: 0.8 (ref 1.1–2.2)
ALP SERPL-CCNC: 76 U/L (ref 45–117)
ALT SERPL-CCNC: 20 U/L (ref 12–78)
ANION GAP SERPL CALC-SCNC: 3 MMOL/L (ref 5–15)
APPEARANCE UR: CLEAR
AST SERPL-CCNC: 15 U/L (ref 15–37)
BACTERIA URNS QL MICRO: ABNORMAL /HPF
BASOPHILS # BLD: 0 K/UL (ref 0–0.1)
BASOPHILS NFR BLD: 0 % (ref 0–1)
BILIRUB SERPL-MCNC: 0.3 MG/DL (ref 0.2–1)
BILIRUB UR QL: NEGATIVE
BUN SERPL-MCNC: 13 MG/DL (ref 6–20)
BUN/CREAT SERPL: 20 (ref 12–20)
CALCIUM SERPL-MCNC: 9.5 MG/DL (ref 8.5–10.1)
CHLORIDE SERPL-SCNC: 112 MMOL/L (ref 97–108)
CO2 SERPL-SCNC: 26 MMOL/L (ref 21–32)
COLOR UR: ABNORMAL
COMMENT:: NORMAL
CREAT SERPL-MCNC: 0.66 MG/DL (ref 0.55–1.02)
DIFFERENTIAL METHOD BLD: ABNORMAL
EOSINOPHIL # BLD: 0 K/UL (ref 0–0.4)
EOSINOPHIL NFR BLD: 0 % (ref 0–7)
EPITH CASTS URNS QL MICRO: ABNORMAL /LPF
ERYTHROCYTE [DISTWIDTH] IN BLOOD BY AUTOMATED COUNT: 17.9 % (ref 11.5–14.5)
GLOBULIN SER CALC-MCNC: 4 G/DL (ref 2–4)
GLUCOSE SERPL-MCNC: 86 MG/DL (ref 65–100)
GLUCOSE UR STRIP.AUTO-MCNC: NEGATIVE MG/DL
HCG UR QL: NEGATIVE
HCG UR QL: NEGATIVE
HCT VFR BLD AUTO: 25.1 % (ref 35–47)
HCT VFR BLD AUTO: 25.2 % (ref 35–47)
HGB BLD-MCNC: 6.8 G/DL (ref 11.5–16)
HGB BLD-MCNC: 7.1 G/DL (ref 11.5–16)
HGB UR QL STRIP: NEGATIVE
HISTORY CHECK: NORMAL
IMM GRANULOCYTES # BLD AUTO: 0 K/UL (ref 0–0.04)
IMM GRANULOCYTES NFR BLD AUTO: 0 % (ref 0–0.5)
IRON SATN MFR SERPL: 5 % (ref 20–50)
IRON SERPL-MCNC: 20 UG/DL (ref 35–150)
KETONES UR QL STRIP.AUTO: NEGATIVE MG/DL
LACTATE SERPL-SCNC: 1.6 MMOL/L (ref 0.4–2)
LEUKOCYTE ESTERASE UR QL STRIP.AUTO: NEGATIVE
LYMPHOCYTES # BLD: 0.4 K/UL (ref 0.8–3.5)
LYMPHOCYTES NFR BLD: 4 % (ref 12–49)
MCH RBC QN AUTO: 18.4 PG (ref 26–34)
MCHC RBC AUTO-ENTMCNC: 27 G/DL (ref 30–36.5)
MCV RBC AUTO: 68.3 FL (ref 80–99)
MONOCYTES # BLD: 0.4 K/UL (ref 0–1)
MONOCYTES NFR BLD: 4 % (ref 5–13)
NEUTS SEG # BLD: 9.1 K/UL (ref 1.8–8)
NEUTS SEG NFR BLD: 92 % (ref 32–75)
NITRITE UR QL STRIP.AUTO: NEGATIVE
NRBC # BLD: 0 K/UL (ref 0–0.01)
NRBC BLD-RTO: 0 PER 100 WBC
PH UR STRIP: 6 (ref 5–8)
PLATELET # BLD AUTO: 294 K/UL (ref 150–400)
PMV BLD AUTO: 10.3 FL (ref 8.9–12.9)
POTASSIUM SERPL-SCNC: 3.3 MMOL/L (ref 3.5–5.1)
PROT SERPL-MCNC: 7.3 G/DL (ref 6.4–8.2)
PROT UR STRIP-MCNC: NEGATIVE MG/DL
RBC # BLD AUTO: 3.69 M/UL (ref 3.8–5.2)
RBC #/AREA URNS HPF: ABNORMAL /HPF (ref 0–5)
RBC MORPH BLD: ABNORMAL
SARS-COV-2 RDRP RESP QL NAA+PROBE: NOT DETECTED
SODIUM SERPL-SCNC: 141 MMOL/L (ref 136–145)
SOURCE: NORMAL
SP GR UR REFRACTOMETRY: 1.02 (ref 1–1.03)
SPECIMEN HOLD: NORMAL
TIBC SERPL-MCNC: 423 UG/DL (ref 250–450)
UROBILINOGEN UR QL STRIP.AUTO: 0.2 EU/DL (ref 0.2–1)
WBC # BLD AUTO: 9.9 K/UL (ref 3.6–11)
WBC URNS QL MICRO: ABNORMAL /HPF (ref 0–4)

## 2023-09-02 PROCEDURE — 80053 COMPREHEN METABOLIC PANEL: CPT

## 2023-09-02 PROCEDURE — 36430 TRANSFUSION BLD/BLD COMPNT: CPT

## 2023-09-02 PROCEDURE — 87086 URINE CULTURE/COLONY COUNT: CPT

## 2023-09-02 PROCEDURE — 6370000000 HC RX 637 (ALT 250 FOR IP): Performed by: HOSPITALIST

## 2023-09-02 PROCEDURE — P9016 RBC LEUKOCYTES REDUCED: HCPCS

## 2023-09-02 PROCEDURE — 85014 HEMATOCRIT: CPT

## 2023-09-02 PROCEDURE — 96361 HYDRATE IV INFUSION ADD-ON: CPT

## 2023-09-02 PROCEDURE — 85018 HEMOGLOBIN: CPT

## 2023-09-02 PROCEDURE — 83605 ASSAY OF LACTIC ACID: CPT

## 2023-09-02 PROCEDURE — 86850 RBC ANTIBODY SCREEN: CPT

## 2023-09-02 PROCEDURE — 83550 IRON BINDING TEST: CPT

## 2023-09-02 PROCEDURE — G0378 HOSPITAL OBSERVATION PER HR: HCPCS

## 2023-09-02 PROCEDURE — 86900 BLOOD TYPING SEROLOGIC ABO: CPT

## 2023-09-02 PROCEDURE — 6370000000 HC RX 637 (ALT 250 FOR IP): Performed by: STUDENT IN AN ORGANIZED HEALTH CARE EDUCATION/TRAINING PROGRAM

## 2023-09-02 PROCEDURE — 86920 COMPATIBILITY TEST SPIN: CPT

## 2023-09-02 PROCEDURE — 6360000004 HC RX CONTRAST MEDICATION: Performed by: RADIOLOGY

## 2023-09-02 PROCEDURE — 36415 COLL VENOUS BLD VENIPUNCTURE: CPT

## 2023-09-02 PROCEDURE — 85025 COMPLETE CBC W/AUTO DIFF WBC: CPT

## 2023-09-02 PROCEDURE — 81025 URINE PREGNANCY TEST: CPT

## 2023-09-02 PROCEDURE — 83540 ASSAY OF IRON: CPT

## 2023-09-02 PROCEDURE — 2580000003 HC RX 258: Performed by: HOSPITALIST

## 2023-09-02 PROCEDURE — 87635 SARS-COV-2 COVID-19 AMP PRB: CPT

## 2023-09-02 PROCEDURE — 96375 TX/PRO/DX INJ NEW DRUG ADDON: CPT

## 2023-09-02 PROCEDURE — 6360000002 HC RX W HCPCS: Performed by: HOSPITALIST

## 2023-09-02 PROCEDURE — 2580000003 HC RX 258: Performed by: STUDENT IN AN ORGANIZED HEALTH CARE EDUCATION/TRAINING PROGRAM

## 2023-09-02 PROCEDURE — 93005 ELECTROCARDIOGRAM TRACING: CPT | Performed by: FAMILY MEDICINE

## 2023-09-02 PROCEDURE — 71045 X-RAY EXAM CHEST 1 VIEW: CPT

## 2023-09-02 PROCEDURE — 86901 BLOOD TYPING SEROLOGIC RH(D): CPT

## 2023-09-02 PROCEDURE — 74177 CT ABD & PELVIS W/CONTRAST: CPT

## 2023-09-02 PROCEDURE — 96374 THER/PROPH/DIAG INJ IV PUSH: CPT

## 2023-09-02 PROCEDURE — 99285 EMERGENCY DEPT VISIT HI MDM: CPT

## 2023-09-02 PROCEDURE — 81001 URINALYSIS AUTO W/SCOPE: CPT

## 2023-09-02 PROCEDURE — 94640 AIRWAY INHALATION TREATMENT: CPT

## 2023-09-02 PROCEDURE — A4216 STERILE WATER/SALINE, 10 ML: HCPCS | Performed by: HOSPITALIST

## 2023-09-02 PROCEDURE — 87040 BLOOD CULTURE FOR BACTERIA: CPT

## 2023-09-02 RX ORDER — POLYETHYLENE GLYCOL 3350 17 G/17G
17 POWDER, FOR SOLUTION ORAL DAILY PRN
Status: DISCONTINUED | OUTPATIENT
Start: 2023-09-02 | End: 2023-09-04 | Stop reason: HOSPADM

## 2023-09-02 RX ORDER — CHOLECALCIFEROL (VITAMIN D3) 125 MCG
1000 CAPSULE ORAL DAILY
Status: DISCONTINUED | OUTPATIENT
Start: 2023-09-02 | End: 2023-09-04 | Stop reason: HOSPADM

## 2023-09-02 RX ORDER — ACETAMINOPHEN 500 MG
1000 TABLET ORAL
Status: COMPLETED | OUTPATIENT
Start: 2023-09-02 | End: 2023-09-02

## 2023-09-02 RX ORDER — ALBUTEROL SULFATE 2.5 MG/3ML
2.5 SOLUTION RESPIRATORY (INHALATION) EVERY 6 HOURS PRN
Status: DISCONTINUED | OUTPATIENT
Start: 2023-09-02 | End: 2023-09-04 | Stop reason: HOSPADM

## 2023-09-02 RX ORDER — SODIUM CHLORIDE 9 MG/ML
INJECTION, SOLUTION INTRAVENOUS CONTINUOUS
Status: DISCONTINUED | OUTPATIENT
Start: 2023-09-02 | End: 2023-09-02

## 2023-09-02 RX ORDER — SODIUM CHLORIDE 9 MG/ML
INJECTION, SOLUTION INTRAVENOUS PRN
Status: DISCONTINUED | OUTPATIENT
Start: 2023-09-02 | End: 2023-09-03

## 2023-09-02 RX ORDER — SODIUM CHLORIDE 9 MG/ML
INJECTION, SOLUTION INTRAVENOUS PRN
Status: DISCONTINUED | OUTPATIENT
Start: 2023-09-02 | End: 2023-09-04 | Stop reason: HOSPADM

## 2023-09-02 RX ORDER — SODIUM CHLORIDE 0.9 % (FLUSH) 0.9 %
5-40 SYRINGE (ML) INJECTION EVERY 12 HOURS SCHEDULED
Status: DISCONTINUED | OUTPATIENT
Start: 2023-09-02 | End: 2023-09-04 | Stop reason: HOSPADM

## 2023-09-02 RX ORDER — ACETAMINOPHEN 325 MG/1
650 TABLET ORAL EVERY 6 HOURS PRN
Status: DISCONTINUED | OUTPATIENT
Start: 2023-09-02 | End: 2023-09-02

## 2023-09-02 RX ORDER — SODIUM CHLORIDE 0.9 % (FLUSH) 0.9 %
5-40 SYRINGE (ML) INJECTION PRN
Status: DISCONTINUED | OUTPATIENT
Start: 2023-09-02 | End: 2023-09-04 | Stop reason: HOSPADM

## 2023-09-02 RX ORDER — ONDANSETRON 2 MG/ML
4 INJECTION INTRAMUSCULAR; INTRAVENOUS EVERY 6 HOURS PRN
Status: DISCONTINUED | OUTPATIENT
Start: 2023-09-02 | End: 2023-09-04 | Stop reason: HOSPADM

## 2023-09-02 RX ORDER — 0.9 % SODIUM CHLORIDE 0.9 %
1000 INTRAVENOUS SOLUTION INTRAVENOUS ONCE
Status: COMPLETED | OUTPATIENT
Start: 2023-09-02 | End: 2023-09-02

## 2023-09-02 RX ORDER — MONTELUKAST SODIUM 10 MG/1
10 TABLET ORAL DAILY
Status: DISCONTINUED | OUTPATIENT
Start: 2023-09-02 | End: 2023-09-04 | Stop reason: HOSPADM

## 2023-09-02 RX ORDER — SODIUM CHLORIDE AND POTASSIUM CHLORIDE 300; 900 MG/100ML; MG/100ML
INJECTION, SOLUTION INTRAVENOUS CONTINUOUS
Status: DISCONTINUED | OUTPATIENT
Start: 2023-09-02 | End: 2023-09-04

## 2023-09-02 RX ORDER — ONDANSETRON 4 MG/1
4 TABLET, ORALLY DISINTEGRATING ORAL EVERY 8 HOURS PRN
Status: DISCONTINUED | OUTPATIENT
Start: 2023-09-02 | End: 2023-09-04 | Stop reason: HOSPADM

## 2023-09-02 RX ORDER — ACETAMINOPHEN 650 MG/1
650 SUPPOSITORY RECTAL EVERY 6 HOURS PRN
Status: DISCONTINUED | OUTPATIENT
Start: 2023-09-02 | End: 2023-09-02

## 2023-09-02 RX ORDER — FERROUS SULFATE 325(65) MG
325 TABLET ORAL 2 TIMES DAILY WITH MEALS
Status: DISCONTINUED | OUTPATIENT
Start: 2023-09-02 | End: 2023-09-03

## 2023-09-02 RX ORDER — ACETAMINOPHEN 500 MG
1000 TABLET ORAL EVERY 6 HOURS PRN
Status: DISCONTINUED | OUTPATIENT
Start: 2023-09-02 | End: 2023-09-04

## 2023-09-02 RX ADMIN — ACETAMINOPHEN 1000 MG: 500 TABLET ORAL at 20:55

## 2023-09-02 RX ADMIN — SODIUM CHLORIDE 1000 ML: 9 INJECTION, SOLUTION INTRAVENOUS at 11:05

## 2023-09-02 RX ADMIN — SODIUM CHLORIDE 1000 MG: 9 INJECTION INTRAMUSCULAR; INTRAVENOUS; SUBCUTANEOUS at 15:22

## 2023-09-02 RX ADMIN — CYANOCOBALAMIN TAB 500 MCG 1000 MCG: 500 TAB at 15:22

## 2023-09-02 RX ADMIN — MONTELUKAST 10 MG: 10 TABLET, FILM COATED ORAL at 15:22

## 2023-09-02 RX ADMIN — ACETAMINOPHEN 1000 MG: 500 TABLET ORAL at 11:05

## 2023-09-02 RX ADMIN — IOPAMIDOL 100 ML: 755 INJECTION, SOLUTION INTRAVENOUS at 12:01

## 2023-09-02 RX ADMIN — ALBUTEROL SULFATE 2.5 MG: 2.5 SOLUTION RESPIRATORY (INHALATION) at 21:20

## 2023-09-02 RX ADMIN — SODIUM CHLORIDE, PRESERVATIVE FREE 10 ML: 5 INJECTION INTRAVENOUS at 20:56

## 2023-09-02 ASSESSMENT — PAIN DESCRIPTION - DESCRIPTORS: DESCRIPTORS: THROBBING;ACHING

## 2023-09-02 ASSESSMENT — PAIN - FUNCTIONAL ASSESSMENT: PAIN_FUNCTIONAL_ASSESSMENT: 0-10

## 2023-09-02 ASSESSMENT — PAIN SCALES - GENERAL
PAINLEVEL_OUTOF10: 7
PAINLEVEL_OUTOF10: 9

## 2023-09-02 ASSESSMENT — PAIN DESCRIPTION - LOCATION
LOCATION: PELVIS
LOCATION: ABDOMEN

## 2023-09-02 NOTE — ED TRIAGE NOTES
Pt arrives by EMS c/o having 9/10  abdominal pain at 8am this morning x4 quadrants. Endorses CP, difficulty breathing, N/V, and feels cold. Rebound tenderness on abdomen. Pt has not used bathroom in 3-4 days.  \"Feels similar to when she had blood in abdomen from cyst\"       EMS vitals:   -82  103.5  Hr: 112
Detail Level: Zone
Discontinue Regimen: betamethasone dipropionate cream

## 2023-09-02 NOTE — H&P
of my ability given all available resources at the time of admission. Route is PO if not otherwise noted. Family and social history were personally reviewed, all pertinent and relevant details are outlined as above. Objective:     PHYSICAL EXAM:   Vital signs: Reviewed  Weight/BMI: Reviewed      General: Lying in bed comfortably     HEENT: Pale conjunctive a. No jaundice    Chest/Lungs: On room air. Symmetrical air entry bilaterally, no added sounds. CVS/Heart: Tachycardic, regular, no gallop or murmur    Abdomen: Lower abdominal tenderness without rebound or guarding    Extremity/Musculoskeletal/Back: No edema or deformity    Skin: No depigmentation or rash    CNS:   Mental status: Alert and oriented x3   Cranial nerves 2-12: Intact   Sensory exam: Intact   Motor exam: Intact   Gait: Not tested    Data Review:   I have independently reviewed and interpreted patient's lab and all other diagnostic data    Abnormal Labs Reviewed   CBC WITH AUTO DIFFERENTIAL - Abnormal; Notable for the following components:       Result Value    RBC 3.69 (*)     Hemoglobin 6.8 (*)     Hematocrit 25.2 (*)     MCV 68.3 (*)     MCH 18.4 (*)     MCHC 27.0 (*)     RDW 17.9 (*)     Neutrophils % 92 (*)     Lymphocytes % 4 (*)     Monocytes % 4 (*)     Neutrophils Absolute 9.1 (*)     Lymphocytes Absolute 0.4 (*)     All other components within normal limits   COMPREHENSIVE METABOLIC PANEL - Abnormal; Notable for the following components:    Potassium 3.3 (*)     Chloride 112 (*)     Anion Gap 3 (*)     Albumin 3.3 (*)     Albumin/Globulin Ratio 0.8 (*)     All other components within normal limits   URINALYSIS WITH MICROSCOPIC - Abnormal; Notable for the following components:    BACTERIA, URINE 1+ (*)     All other components within normal limits       [unfilled]    IMAGING:   CT ABDOMEN PELVIS W IV CONTRAST Additional Contrast? None   Final Result   No acute abnormality in the abdomen or pelvis.  Enlarged fibroid uterus,

## 2023-09-03 LAB
ABO + RH BLD: NORMAL
ANION GAP SERPL CALC-SCNC: 7 MMOL/L (ref 5–15)
BACTERIA SPEC CULT: NORMAL
BASOPHILS # BLD: 0 K/UL (ref 0–0.1)
BASOPHILS NFR BLD: 0 % (ref 0–1)
BLD PROD TYP BPU: NORMAL
BLOOD BANK DISPENSE STATUS: NORMAL
BLOOD GROUP ANTIBODIES SERPL: NORMAL
BPU ID: NORMAL
BUN SERPL-MCNC: 8 MG/DL (ref 6–20)
BUN/CREAT SERPL: 13 (ref 12–20)
CALCIUM SERPL-MCNC: 9.2 MG/DL (ref 8.5–10.1)
CC UR VC: NORMAL
CHLORIDE SERPL-SCNC: 112 MMOL/L (ref 97–108)
CO2 SERPL-SCNC: 20 MMOL/L (ref 21–32)
CREAT SERPL-MCNC: 0.6 MG/DL (ref 0.55–1.02)
CROSSMATCH RESULT: NORMAL
DIFFERENTIAL METHOD BLD: ABNORMAL
EOSINOPHIL # BLD: 0 K/UL (ref 0–0.4)
EOSINOPHIL NFR BLD: 0 % (ref 0–7)
ERYTHROCYTE [DISTWIDTH] IN BLOOD BY AUTOMATED COUNT: 19.4 % (ref 11.5–14.5)
GLUCOSE SERPL-MCNC: 84 MG/DL (ref 65–100)
HCT VFR BLD AUTO: 25.6 % (ref 35–47)
HGB BLD-MCNC: 7.2 G/DL (ref 11.5–16)
IMM GRANULOCYTES # BLD AUTO: 0 K/UL (ref 0–0.04)
IMM GRANULOCYTES NFR BLD AUTO: 0 % (ref 0–0.5)
LYMPHOCYTES # BLD: 1.1 K/UL (ref 0.8–3.5)
LYMPHOCYTES NFR BLD: 8 % (ref 12–49)
MCH RBC QN AUTO: 19.6 PG (ref 26–34)
MCHC RBC AUTO-ENTMCNC: 28.1 G/DL (ref 30–36.5)
MCV RBC AUTO: 69.8 FL (ref 80–99)
MONOCYTES # BLD: 0.7 K/UL (ref 0–1)
MONOCYTES NFR BLD: 5 % (ref 5–13)
NEUTS SEG # BLD: 11.9 K/UL (ref 1.8–8)
NEUTS SEG NFR BLD: 87 % (ref 32–75)
NRBC # BLD: 0 K/UL (ref 0–0.01)
NRBC BLD-RTO: 0 PER 100 WBC
PLATELET # BLD AUTO: 283 K/UL (ref 150–400)
PMV BLD AUTO: 10.5 FL (ref 8.9–12.9)
POTASSIUM SERPL-SCNC: 3.8 MMOL/L (ref 3.5–5.1)
RBC # BLD AUTO: 3.67 M/UL (ref 3.8–5.2)
RBC MORPH BLD: ABNORMAL
SERVICE CMNT-IMP: NORMAL
SODIUM SERPL-SCNC: 139 MMOL/L (ref 136–145)
SPECIMEN EXP DATE BLD: NORMAL
UNIT DIVISION: 0
WBC # BLD AUTO: 13.7 K/UL (ref 3.6–11)

## 2023-09-03 PROCEDURE — 85025 COMPLETE CBC W/AUTO DIFF WBC: CPT

## 2023-09-03 PROCEDURE — 6360000002 HC RX W HCPCS: Performed by: NURSE PRACTITIONER

## 2023-09-03 PROCEDURE — 2580000003 HC RX 258: Performed by: NURSE PRACTITIONER

## 2023-09-03 PROCEDURE — 6370000000 HC RX 637 (ALT 250 FOR IP): Performed by: HOSPITALIST

## 2023-09-03 PROCEDURE — 6360000002 HC RX W HCPCS: Performed by: HOSPITALIST

## 2023-09-03 PROCEDURE — 6370000000 HC RX 637 (ALT 250 FOR IP): Performed by: NURSE PRACTITIONER

## 2023-09-03 PROCEDURE — 96376 TX/PRO/DX INJ SAME DRUG ADON: CPT

## 2023-09-03 PROCEDURE — A4216 STERILE WATER/SALINE, 10 ML: HCPCS | Performed by: HOSPITALIST

## 2023-09-03 PROCEDURE — 2580000003 HC RX 258: Performed by: HOSPITALIST

## 2023-09-03 PROCEDURE — 80048 BASIC METABOLIC PNL TOTAL CA: CPT

## 2023-09-03 PROCEDURE — 96365 THER/PROPH/DIAG IV INF INIT: CPT

## 2023-09-03 PROCEDURE — 94640 AIRWAY INHALATION TREATMENT: CPT

## 2023-09-03 PROCEDURE — G0378 HOSPITAL OBSERVATION PER HR: HCPCS

## 2023-09-03 PROCEDURE — 36415 COLL VENOUS BLD VENIPUNCTURE: CPT

## 2023-09-03 RX ORDER — TRAMADOL HYDROCHLORIDE 50 MG/1
50 TABLET ORAL EVERY 6 HOURS PRN
Status: DISCONTINUED | OUTPATIENT
Start: 2023-09-03 | End: 2023-09-04 | Stop reason: HOSPADM

## 2023-09-03 RX ADMIN — ARFORMOTEROL TARTRATE: 15 SOLUTION RESPIRATORY (INHALATION) at 08:15

## 2023-09-03 RX ADMIN — FERROUS SULFATE TAB 325 MG (65 MG ELEMENTAL FE) 325 MG: 325 (65 FE) TAB at 16:11

## 2023-09-03 RX ADMIN — IRON SUCROSE 300 MG: 20 INJECTION, SOLUTION INTRAVENOUS at 18:17

## 2023-09-03 RX ADMIN — CYANOCOBALAMIN TAB 500 MCG 1000 MCG: 500 TAB at 09:15

## 2023-09-03 RX ADMIN — ARFORMOTEROL TARTRATE: 15 SOLUTION RESPIRATORY (INHALATION) at 20:09

## 2023-09-03 RX ADMIN — TRAMADOL HYDROCHLORIDE 50 MG: 50 TABLET ORAL at 10:47

## 2023-09-03 RX ADMIN — MONTELUKAST 10 MG: 10 TABLET, FILM COATED ORAL at 09:15

## 2023-09-03 RX ADMIN — ACETAMINOPHEN 1000 MG: 500 TABLET ORAL at 13:10

## 2023-09-03 RX ADMIN — SODIUM CHLORIDE 1000 MG: 9 INJECTION INTRAMUSCULAR; INTRAVENOUS; SUBCUTANEOUS at 13:13

## 2023-09-03 RX ADMIN — TRAMADOL HYDROCHLORIDE 50 MG: 50 TABLET ORAL at 16:23

## 2023-09-03 RX ADMIN — SODIUM CHLORIDE, PRESERVATIVE FREE 10 ML: 5 INJECTION INTRAVENOUS at 21:19

## 2023-09-03 RX ADMIN — FERROUS SULFATE TAB 325 MG (65 MG ELEMENTAL FE) 325 MG: 325 (65 FE) TAB at 09:15

## 2023-09-03 ASSESSMENT — PAIN SCALES - GENERAL
PAINLEVEL_OUTOF10: 8
PAINLEVEL_OUTOF10: 9
PAINLEVEL_OUTOF10: 8

## 2023-09-03 ASSESSMENT — PAIN DESCRIPTION - ORIENTATION: ORIENTATION: RIGHT;LEFT

## 2023-09-03 ASSESSMENT — PAIN DESCRIPTION - DESCRIPTORS: DESCRIPTORS: ACHING

## 2023-09-03 ASSESSMENT — PAIN DESCRIPTION - LOCATION
LOCATION: HEAD
LOCATION: ABDOMEN;CHEST
LOCATION: HEAD

## 2023-09-03 NOTE — CONSULTS
pain.     COMPARISON: MRI 2022. CT 2022     TECHNIQUE: Helical CT of the abdomen  and pelvis  following the uneventful  intravenous administration of nonionic contrast.  Coronal and sagittal reformats  are performed. CT dose reduction was achieved through use of a standardized  protocol tailored for this examination and automatic exposure control for dose  modulation. Adaptive statistical iterative reconstruction (ASIR) was utilized. FINDINGS:   The visualized lung bases demonstrate no mass or consolidation. The heart size  is normal. There is no pericardial or pleural effusion. The liver, spleen, pancreas, and adrenal glands are normal. The gall bladder is  present  without intra- or extra-hepatic biliary dilatation. The kidneys are symmetric without hydronephrosis. Gastric surgical changes are noted. There are no dilated bowel loops. The  appendix is normal.       There are no enlarged lymph nodes. There is no free fluid or free air. The  aorta tapers without aneurysm. The urinary bladder is normal. An enlarged fibroid uterus measures 17.9 cm in  length, previously 16.2 cm. The largest uterine fibroid measures 11.6 x 10.1 cm,  previously 11.6 x 8.3 cm. The bony structures are age-appropriate. IMPRESSION:  No acute abnormality in the abdomen or pelvis. Enlarged fibroid uterus, stable  to slightly increased in size since 2022. Assessment:     Principal Problem:    Severe anemia  Resolved Problems:    * No resolved hospital problems. *    42 y/o  Lat Child 15 years ago by  section(oldest child 21 y/o) with Sever anemia presumed secondary to known stable Fibroid w/o Menorrhagia(normal 4-7 Day period) which she describes as only normal and mildly heavy on first day only.  While she as known fibroids which may cause her anemia, I am concerned that there is another etiology that needs to be explored during this hospitalization Via medicine team and or GI(marito

## 2023-09-04 VITALS
WEIGHT: 281 LBS | SYSTOLIC BLOOD PRESSURE: 150 MMHG | HEART RATE: 83 BPM | BODY MASS INDEX: 47.97 KG/M2 | DIASTOLIC BLOOD PRESSURE: 64 MMHG | HEIGHT: 64 IN | RESPIRATION RATE: 18 BRPM | OXYGEN SATURATION: 97 % | TEMPERATURE: 98.3 F

## 2023-09-04 LAB
ANION GAP SERPL CALC-SCNC: 4 MMOL/L (ref 5–15)
BASOPHILS # BLD: 0.1 K/UL (ref 0–0.1)
BASOPHILS NFR BLD: 1 % (ref 0–1)
BUN SERPL-MCNC: 6 MG/DL (ref 6–20)
BUN/CREAT SERPL: 12 (ref 12–20)
CALCIUM SERPL-MCNC: 8.9 MG/DL (ref 8.5–10.1)
CHLORIDE SERPL-SCNC: 112 MMOL/L (ref 97–108)
CO2 SERPL-SCNC: 19 MMOL/L (ref 21–32)
CREAT SERPL-MCNC: 0.52 MG/DL (ref 0.55–1.02)
CRP SERPL-MCNC: 9.31 MG/DL (ref 0–0.6)
DIFFERENTIAL METHOD BLD: ABNORMAL
EKG ATRIAL RATE: 118 BPM
EKG DIAGNOSIS: NORMAL
EKG P AXIS: 59 DEGREES
EKG P-R INTERVAL: 148 MS
EKG Q-T INTERVAL: 294 MS
EKG QRS DURATION: 72 MS
EKG QTC CALCULATION (BAZETT): 412 MS
EKG R AXIS: 69 DEGREES
EKG T AXIS: -16 DEGREES
EKG VENTRICULAR RATE: 118 BPM
EOSINOPHIL # BLD: 0.1 K/UL (ref 0–0.4)
EOSINOPHIL NFR BLD: 1 % (ref 0–7)
ERYTHROCYTE [DISTWIDTH] IN BLOOD BY AUTOMATED COUNT: 19.9 % (ref 11.5–14.5)
ERYTHROCYTE [SEDIMENTATION RATE] IN BLOOD: 43 MM/HR (ref 0–20)
GLUCOSE SERPL-MCNC: 83 MG/DL (ref 65–100)
HCT VFR BLD AUTO: 25.2 % (ref 35–47)
HGB BLD-MCNC: 7.1 G/DL (ref 11.5–16)
IMM GRANULOCYTES # BLD AUTO: 0.1 K/UL (ref 0–0.04)
IMM GRANULOCYTES NFR BLD AUTO: 1 % (ref 0–0.5)
LYMPHOCYTES # BLD: 1.2 K/UL (ref 0.8–3.5)
LYMPHOCYTES NFR BLD: 20 % (ref 12–49)
MCH RBC QN AUTO: 19.4 PG (ref 26–34)
MCHC RBC AUTO-ENTMCNC: 28.2 G/DL (ref 30–36.5)
MCV RBC AUTO: 68.9 FL (ref 80–99)
MONOCYTES # BLD: 0.6 K/UL (ref 0–1)
MONOCYTES NFR BLD: 9 % (ref 5–13)
NEUTS SEG # BLD: 4.1 K/UL (ref 1.8–8)
NEUTS SEG NFR BLD: 68 % (ref 32–75)
NRBC # BLD: 0.02 K/UL (ref 0–0.01)
NRBC BLD-RTO: 0.3 PER 100 WBC
PLATELET # BLD AUTO: 263 K/UL (ref 150–400)
PMV BLD AUTO: 10.4 FL (ref 8.9–12.9)
POTASSIUM SERPL-SCNC: 3.9 MMOL/L (ref 3.5–5.1)
RBC # BLD AUTO: 3.66 M/UL (ref 3.8–5.2)
RBC MORPH BLD: ABNORMAL
SODIUM SERPL-SCNC: 135 MMOL/L (ref 136–145)
WBC # BLD AUTO: 6.2 K/UL (ref 3.6–11)

## 2023-09-04 PROCEDURE — 36592 COLLECT BLOOD FROM PICC: CPT

## 2023-09-04 PROCEDURE — 6370000000 HC RX 637 (ALT 250 FOR IP): Performed by: HOSPITALIST

## 2023-09-04 PROCEDURE — 6370000000 HC RX 637 (ALT 250 FOR IP): Performed by: NURSE PRACTITIONER

## 2023-09-04 PROCEDURE — 80048 BASIC METABOLIC PNL TOTAL CA: CPT

## 2023-09-04 PROCEDURE — 6360000002 HC RX W HCPCS: Performed by: HOSPITALIST

## 2023-09-04 PROCEDURE — G0378 HOSPITAL OBSERVATION PER HR: HCPCS

## 2023-09-04 PROCEDURE — 85025 COMPLETE CBC W/AUTO DIFF WBC: CPT

## 2023-09-04 PROCEDURE — 85652 RBC SED RATE AUTOMATED: CPT

## 2023-09-04 PROCEDURE — 86140 C-REACTIVE PROTEIN: CPT

## 2023-09-04 PROCEDURE — 36415 COLL VENOUS BLD VENIPUNCTURE: CPT

## 2023-09-04 PROCEDURE — 94640 AIRWAY INHALATION TREATMENT: CPT

## 2023-09-04 PROCEDURE — 96376 TX/PRO/DX INJ SAME DRUG ADON: CPT

## 2023-09-04 PROCEDURE — 2580000003 HC RX 258: Performed by: HOSPITALIST

## 2023-09-04 PROCEDURE — 96366 THER/PROPH/DIAG IV INF ADDON: CPT

## 2023-09-04 PROCEDURE — 96367 TX/PROPH/DG ADDL SEQ IV INF: CPT

## 2023-09-04 PROCEDURE — A4216 STERILE WATER/SALINE, 10 ML: HCPCS | Performed by: HOSPITALIST

## 2023-09-04 PROCEDURE — 93010 ELECTROCARDIOGRAM REPORT: CPT | Performed by: SPECIALIST

## 2023-09-04 RX ORDER — TRAMADOL HYDROCHLORIDE 50 MG/1
50 TABLET ORAL EVERY 6 HOURS PRN
Qty: 8 TABLET | Refills: 0 | Status: SHIPPED | OUTPATIENT
Start: 2023-09-04 | End: 2023-09-07

## 2023-09-04 RX ORDER — FERROUS SULFATE 325(65) MG
325 TABLET ORAL
Qty: 30 TABLET | Refills: 3 | Status: SHIPPED | OUTPATIENT
Start: 2023-09-04

## 2023-09-04 RX ORDER — ACETAMINOPHEN 500 MG
1000 TABLET ORAL EVERY 8 HOURS SCHEDULED
Status: DISCONTINUED | OUTPATIENT
Start: 2023-09-04 | End: 2023-09-04 | Stop reason: HOSPADM

## 2023-09-04 RX ORDER — BUTALBITAL, ACETAMINOPHEN AND CAFFEINE 50; 325; 40 MG/1; MG/1; MG/1
1 TABLET ORAL EVERY 4 HOURS PRN
Qty: 14 TABLET | Refills: 0 | Status: SHIPPED | OUTPATIENT
Start: 2023-09-04 | End: 2023-10-04

## 2023-09-04 RX ORDER — BUTALBITAL, ACETAMINOPHEN AND CAFFEINE 50; 325; 40 MG/1; MG/1; MG/1
1 TABLET ORAL EVERY 4 HOURS PRN
Status: DISCONTINUED | OUTPATIENT
Start: 2023-09-04 | End: 2023-09-04 | Stop reason: HOSPADM

## 2023-09-04 RX ADMIN — CYANOCOBALAMIN TAB 500 MCG 1000 MCG: 500 TAB at 08:36

## 2023-09-04 RX ADMIN — TRAMADOL HYDROCHLORIDE 50 MG: 50 TABLET ORAL at 08:49

## 2023-09-04 RX ADMIN — POTASSIUM CHLORIDE AND SODIUM CHLORIDE: 900; 300 INJECTION, SOLUTION INTRAVENOUS at 07:05

## 2023-09-04 RX ADMIN — ARFORMOTEROL TARTRATE: 15 SOLUTION RESPIRATORY (INHALATION) at 07:56

## 2023-09-04 RX ADMIN — MONTELUKAST 10 MG: 10 TABLET, FILM COATED ORAL at 08:36

## 2023-09-04 RX ADMIN — SODIUM CHLORIDE 1000 MG: 9 INJECTION INTRAMUSCULAR; INTRAVENOUS; SUBCUTANEOUS at 13:42

## 2023-09-04 RX ADMIN — ACETAMINOPHEN 1000 MG: 500 TABLET ORAL at 13:43

## 2023-09-04 RX ADMIN — TRAMADOL HYDROCHLORIDE 50 MG: 50 TABLET ORAL at 02:50

## 2023-09-04 ASSESSMENT — PAIN DESCRIPTION - LOCATION
LOCATION: ABDOMEN
LOCATION: HEAD
LOCATION: CHEST;BACK;ABDOMEN

## 2023-09-04 ASSESSMENT — PAIN SCALES - GENERAL
PAINLEVEL_OUTOF10: 4
PAINLEVEL_OUTOF10: 7
PAINLEVEL_OUTOF10: 6
PAINLEVEL_OUTOF10: 6

## 2023-09-04 ASSESSMENT — PAIN DESCRIPTION - ORIENTATION: ORIENTATION: RIGHT;LEFT;ANTERIOR;MID

## 2023-09-04 ASSESSMENT — PAIN DESCRIPTION - DESCRIPTORS: DESCRIPTORS: ACHING;DISCOMFORT;DULL;CRAMPING

## 2023-09-04 NOTE — CARE COORDINATION
Observation notice provided in writing to patient and/or caregiver as well as verbal explanation of the policy.   Patients who are in outpatient status also receive the Observation notice    [facility] [address] Management Department  YL:[phone]

## 2023-09-04 NOTE — DISCHARGE INSTRUCTIONS
Discharge Instructions       PATIENT ID: Guillaume Encinas  MRN: [id number]   YOB: 1980    DATE OF ADMISSION: 9/2/2023   DATE OF DISCHARGE: 9/4/2023    PRIMARY CARE PROVIDER: Xiomara Roach     ATTENDING PHYSICIAN: Tacos Dunn MD   DISCHARGING PROVIDER: JACKIE Ruvalcaba NP    To contact this individual call [phone] and ask the  to page. If unavailable ask to be transferred the Adult Hospitalist Department. DISCHARGE DIAGNOSES anemia, fibroids, UTI    CONSULTATIONS: IP CONSULT TO OB HOSPITALIST    PROCEDURES/SURGERIES: * No surgery found *    PENDING TEST RESULTS:   At the time of discharge the following test results are still pending:     FOLLOW UP APPOINTMENTS:     [facility]   Follow up in 1 week(s)   [address] [facility]     Montana Patel MD Internal Medicine Follow up in 1 week(s)   [address] [address] Montana Patel MD Internal Medicine     [address] [facility]  [phone]        Linus Painting MD Gastroenterology Follow up in 1 week(s)   [address] [place] [address], [address]  [phone]                  ADDITIONAL CARE RECOMMENDATIONS:     DIET: regular diet      ACTIVITY: activity as tolerated    WOUND CARE: na    EQUIPMENT needed: heating pad      DISCHARGE MEDICATIONS:   See Medication Reconciliation Form    It is important that you take the medication exactly as they are prescribed. Keep your medication in the bottles provided by the pharmacist and keep a list of the medication names, dosages, and times to be taken in your wallet. Do not take other medications without consulting your doctor. NOTIFY YOUR PHYSICIAN FOR ANY OF THE FOLLOWING:   Fever over 101 degrees for 24 hours.    Chest pain, shortness of breath, fever, chills, nausea, vomiting, diarrhea, change in mentation,

## 2023-09-04 NOTE — DISCHARGE SUMMARY
Discharge Summary       PATIENT ID: Susi Sharma  MRN: [id number]   YOB: 1980    DATE OF ADMISSION: 9/2/2023 10:13 AM    DATE OF DISCHARGE: 9/4/2023   PRIMARY CARE PROVIDER: Jane Guzman MD     ATTENDING PHYSICIAN: Mabel Allen MD  DISCHARGING PROVIDER: JACKIE Ortega NP    To contact this individual call [phone] and ask the  to page. If unavailable ask to be transferred the Adult Hospitalist Department. CONSULTATIONS: IP CONSULT TO OB HOSPITALIST    PROCEDURES/SURGERIES: * No surgery found *     ADMITTING [address] [facility], [address] COURSE:      Ms. Peter Zeng is a 41 y.o. female who presented to the ED with severe lower abdominal pain and dizziness. She has history of fibroids and she had seen her OB/GYN 2 days PTA. She was prescribed a medicine (Myfembree) which she has not received due to insurance coverage issues and is awaiting to get a call back from her OB/GYN. In the ED, she was found to have severe anemia with Hgb of 6.8 and indication for transfusion. She does not feel her cycle is heavy and denies nausea, vomiting, hematemesis or melena. In the ED she was febrile temp 101.2, tachycardic .              DISCHARGE DIAGNOSES / PLAN:      Acute on chronic severe microcytic anemia, iron deficiency suspect due to AUB   Received 1 unit packed red cells for hemoglobin 6.8  Hemoglobin has remained stable, hemoglobin today 7.1  Received iron sucrose  Has been seen by GYN, bleeding does not appear to be GYN related  To follow-up with gastroenterology, will endoscopy outpatient  Still with pain, adjusting medication regimen           SIRS   Fever and tachycardia both have since resolved  Chest radiograph unremarkable  UA with 5-10 WBC, +1 bacteria, unlikely UTI however did receive course of treatment with ceftriaxone  CRP elevated  Abdominal CT with no acute abnormality  Continuing to monitor        Mild hypokalemia  Resolved     History of asthma  Stable, not in

## 2023-09-07 ENCOUNTER — TELEPHONE (OUTPATIENT)
Age: 43
End: 2023-09-07

## 2023-09-07 LAB
BACTERIA SPEC CULT: NORMAL
BACTERIA SPEC CULT: NORMAL
SERVICE CMNT-IMP: NORMAL
SERVICE CMNT-IMP: NORMAL

## 2023-09-08 ASSESSMENT — ENCOUNTER SYMPTOMS: SHORTNESS OF BREATH: 0

## 2023-09-08 NOTE — PROGRESS NOTES
GI evaluation scheduled for October with EGD and colonoscopy    She will be seeing gynecology as well to discuss hysterectomy    She likely needs to follow-up with her bariatric surgeon note as well but this will need to be addressed after she has completed the above evaluation    She is currently taking iron    We will need to repeat CBC in about 1 to 2 weeks to ensure blood counts are stable    Discussed all the above with patient and stressed the importance of following through with plan    Of note she is also overdue with healthcare maintenance which I discussed       2. Mixed hyperlipidemia         Previously on Lipitor weight is climbing needs to check lipids ordered treat as needed once results back       3. Pulmonary emphysema, unspecified emphysema type ([address])         Follows with pulmonary stable and Symbicort       4. Gastroesophageal reflux disease without esophagitis         Not currently having reflux symptoms not on PPI has EGD pending    Await results       5. Obstructive sleep apnea         Past history of sleep apnea never followed through with repeat sleep study not using CPAP discussed this again she will set this up       6. Mild intermittent asthma without complication         Follows with pulmonary stable on Symbicort       7. Elevated BP without diagnosis of hypertension         Needs to come to clinic for blood pressure check see if this is truly elevated or was just elevated during her hospitalization we will determine treatment when she comes in       8. Liver mass         Focal nodular hyperplasia    Follows with hepatology clinic       9. Class 3 severe obesity due to excess calories with serious comorbidity and body mass index (BMI) of 45.0 to 49.9 in adult [facility])     Patient with severe obesity past bariatric surgery will need to follow-up with them needs to work on weight loss and           Depression screen reviewed and negative.   Scribed by Beryl Lazaro of [facility], as dictated by

## 2023-09-15 ENCOUNTER — TELEMEDICINE (OUTPATIENT)
Age: 43
End: 2023-09-15
Payer: COMMERCIAL

## 2023-09-15 DIAGNOSIS — E78.2 MIXED HYPERLIPIDEMIA: ICD-10-CM

## 2023-09-15 DIAGNOSIS — D64.9 SEVERE ANEMIA: Primary | ICD-10-CM

## 2023-09-15 DIAGNOSIS — G47.33 OBSTRUCTIVE SLEEP APNEA: ICD-10-CM

## 2023-09-15 DIAGNOSIS — E66.01 CLASS 3 SEVERE OBESITY DUE TO EXCESS CALORIES WITH SERIOUS COMORBIDITY AND BODY MASS INDEX (BMI) OF 45.0 TO 49.9 IN ADULT (HCC): ICD-10-CM

## 2023-09-15 DIAGNOSIS — R16.0 LIVER MASS: ICD-10-CM

## 2023-09-15 DIAGNOSIS — K21.9 GASTROESOPHAGEAL REFLUX DISEASE WITHOUT ESOPHAGITIS: ICD-10-CM

## 2023-09-15 DIAGNOSIS — R03.0 ELEVATED BP WITHOUT DIAGNOSIS OF HYPERTENSION: ICD-10-CM

## 2023-09-15 DIAGNOSIS — J43.9 PULMONARY EMPHYSEMA, UNSPECIFIED EMPHYSEMA TYPE (HCC): ICD-10-CM

## 2023-09-15 DIAGNOSIS — J45.20 MILD INTERMITTENT ASTHMA WITHOUT COMPLICATION: ICD-10-CM

## 2023-09-15 PROBLEM — E66.813 CLASS 3 SEVERE OBESITY DUE TO EXCESS CALORIES WITH SERIOUS COMORBIDITY IN ADULT: Status: ACTIVE | Noted: 2023-09-15

## 2023-09-15 PROCEDURE — 99215 OFFICE O/P EST HI 40 MIN: CPT | Performed by: INTERNAL MEDICINE

## 2023-09-15 RX ORDER — ALBUTEROL SULFATE 2.5 MG/3ML
SOLUTION RESPIRATORY (INHALATION)
Qty: 120 EACH | Refills: 2 | Status: SHIPPED | OUTPATIENT
Start: 2023-09-15

## 2023-09-15 SDOH — ECONOMIC STABILITY: INCOME INSECURITY: HOW HARD IS IT FOR YOU TO PAY FOR THE VERY BASICS LIKE FOOD, HOUSING, MEDICAL CARE, AND HEATING?: NOT HARD AT ALL

## 2023-09-15 SDOH — ECONOMIC STABILITY: FOOD INSECURITY: WITHIN THE PAST 12 MONTHS, YOU WORRIED THAT YOUR FOOD WOULD RUN OUT BEFORE YOU GOT MONEY TO BUY MORE.: NEVER TRUE

## 2023-09-15 SDOH — ECONOMIC STABILITY: HOUSING INSECURITY
IN THE LAST 12 MONTHS, WAS THERE A TIME WHEN YOU DID NOT HAVE A STEADY PLACE TO SLEEP OR SLEPT IN A SHELTER (INCLUDING NOW)?: NO

## 2023-09-15 SDOH — ECONOMIC STABILITY: FOOD INSECURITY: WITHIN THE PAST 12 MONTHS, THE FOOD YOU BOUGHT JUST DIDN'T LAST AND YOU DIDN'T HAVE MONEY TO GET MORE.: NEVER TRUE

## 2023-09-15 ASSESSMENT — PATIENT HEALTH QUESTIONNAIRE - PHQ9
SUM OF ALL RESPONSES TO PHQ QUESTIONS 1-9: 0
1. LITTLE INTEREST OR PLEASURE IN DOING THINGS: 0
SUM OF ALL RESPONSES TO PHQ QUESTIONS 1-9: 0

## 2023-12-14 ENCOUNTER — HOSPITAL ENCOUNTER (OUTPATIENT)
Facility: HOSPITAL | Age: 43
Discharge: HOME | End: 2023-12-14
Payer: COMMERCIAL

## 2023-12-14 VITALS
WEIGHT: 286.6 LBS | HEART RATE: 77 BPM | DIASTOLIC BLOOD PRESSURE: 74 MMHG | BODY MASS INDEX: 48.93 KG/M2 | RESPIRATION RATE: 12 BRPM | SYSTOLIC BLOOD PRESSURE: 124 MMHG | HEIGHT: 64 IN | TEMPERATURE: 97.9 F

## 2023-12-14 LAB
ABO + RH BLD: NORMAL
ALBUMIN SERPL-MCNC: 3.4 G/DL (ref 3.5–5)
ALBUMIN/GLOB SERPL: 0.9 (ref 1.1–2.2)
ALP SERPL-CCNC: 85 U/L (ref 45–117)
ALT SERPL-CCNC: 19 U/L (ref 12–78)
ANION GAP SERPL CALC-SCNC: 3 MMOL/L (ref 5–15)
AST SERPL-CCNC: 10 U/L (ref 15–37)
BASOPHILS # BLD: 0 K/UL (ref 0–0.1)
BASOPHILS NFR BLD: 1 % (ref 0–1)
BILIRUB SERPL-MCNC: 0.2 MG/DL (ref 0.2–1)
BLOOD GROUP ANTIBODIES SERPL: NORMAL
BUN SERPL-MCNC: 12 MG/DL (ref 6–20)
BUN/CREAT SERPL: 21 (ref 12–20)
CALCIUM SERPL-MCNC: 9.5 MG/DL (ref 8.5–10.1)
CHLORIDE SERPL-SCNC: 110 MMOL/L (ref 97–108)
CO2 SERPL-SCNC: 28 MMOL/L (ref 21–32)
CREAT SERPL-MCNC: 0.56 MG/DL (ref 0.55–1.02)
DIFFERENTIAL METHOD BLD: ABNORMAL
EOSINOPHIL # BLD: 0.1 K/UL (ref 0–0.4)
EOSINOPHIL NFR BLD: 2 % (ref 0–7)
ERYTHROCYTE [DISTWIDTH] IN BLOOD BY AUTOMATED COUNT: 15.3 % (ref 11.5–14.5)
GLOBULIN SER CALC-MCNC: 3.8 G/DL (ref 2–4)
GLUCOSE SERPL-MCNC: 85 MG/DL (ref 65–100)
HCG UR QL: NEGATIVE
HCT VFR BLD AUTO: 32.9 % (ref 35–47)
HGB BLD-MCNC: 9.6 G/DL (ref 11.5–16)
IMM GRANULOCYTES # BLD AUTO: 0 K/UL (ref 0–0.04)
IMM GRANULOCYTES NFR BLD AUTO: 0 % (ref 0–0.5)
LYMPHOCYTES # BLD: 2 K/UL (ref 0.8–3.5)
LYMPHOCYTES NFR BLD: 46 % (ref 12–49)
MCH RBC QN AUTO: 23.9 PG (ref 26–34)
MCHC RBC AUTO-ENTMCNC: 29.2 G/DL (ref 30–36.5)
MCV RBC AUTO: 82 FL (ref 80–99)
MONOCYTES # BLD: 0.4 K/UL (ref 0–1)
MONOCYTES NFR BLD: 9 % (ref 5–13)
NEUTS SEG # BLD: 1.8 K/UL (ref 1.8–8)
NEUTS SEG NFR BLD: 42 % (ref 32–75)
NRBC # BLD: 0 K/UL (ref 0–0.01)
NRBC BLD-RTO: 0 PER 100 WBC
PLATELET # BLD AUTO: 248 K/UL (ref 150–400)
PMV BLD AUTO: 10.9 FL (ref 8.9–12.9)
POTASSIUM SERPL-SCNC: 4.3 MMOL/L (ref 3.5–5.1)
PROT SERPL-MCNC: 7.2 G/DL (ref 6.4–8.2)
RBC # BLD AUTO: 4.01 M/UL (ref 3.8–5.2)
SODIUM SERPL-SCNC: 141 MMOL/L (ref 136–145)
SPECIMEN EXP DATE BLD: NORMAL
WBC # BLD AUTO: 4.3 K/UL (ref 3.6–11)

## 2023-12-14 PROCEDURE — 81025 URINE PREGNANCY TEST: CPT

## 2023-12-14 PROCEDURE — 86850 RBC ANTIBODY SCREEN: CPT

## 2023-12-14 PROCEDURE — 85025 COMPLETE CBC W/AUTO DIFF WBC: CPT

## 2023-12-14 PROCEDURE — 86901 BLOOD TYPING SEROLOGIC RH(D): CPT

## 2023-12-14 PROCEDURE — 80053 COMPREHEN METABOLIC PANEL: CPT

## 2023-12-14 PROCEDURE — 86900 BLOOD TYPING SEROLOGIC ABO: CPT

## 2023-12-14 PROCEDURE — 36415 COLL VENOUS BLD VENIPUNCTURE: CPT

## 2023-12-14 NOTE — PERIOP NOTE
Surgical consent obtained and signed by patient. Patient demonstrated proper use of Incentive Spirometer. Advance Directive form given to patient to complete; advised to bring DOS. PAT Nutrition Screen    1. Has the patient had an unplanned weight loss of 10% of body weight or more in the last 6 months? No = 0   2. Has the patient been eating less than 50% of their normal diet in the preceding week? No = 0       Patient instructed on Non-Diabetic pre-operative nutrition plan to start 5 days prior to surgery. Patient was instructed to purchase 10 shakes and 3 pre-surgery drinks. Opportunity given for questions and all questions answered. Coupon for Ensure Complete Shakes provided for patient.

## 2023-12-14 NOTE — PERIOP NOTE
on the day of surgery. The Preadmission Testing staff can be reached at [phone]. Special instructions: BRING ALBUTEROL INHALER DAY OF SURGERY. BRING ADVANCE DIRECTIVE DAY OF SURGERY IF YOU COMPLETED THE FORM GIVEN TO YOU.  BRING CPAP DAY OF SURGERY. Eating and Drinking Before Surgery    You may eat a regular dinner at the usual time on the day before your surgery. If your surgery requires a bowel prep, follow prep instructions given to you by your surgeon. Do NOT eat any solid foods after midnight. You may drink clear liquids only from 12 midnight until 1 hour prior to your arrival time at the hospital on the day of your surgery. Do NOT drink alcohol. Clear liquids include:  Water  Fruit juices without pulp( i.e. apple juice)  Carbonated beverages  Black coffee (no cream/milk)  Tea (no cream/milk)  Gatorade  You may drink up to 12-16 ounces at one time every 4 hours between the hours of midnight and 1 hour before your arrival time at the hospital. Example- if your arrival time at the hospital is 6am, you may drink 12-16 ounces of clear liquids no later than 5am.  If you have any questions, please contact your surgeon's office.      Current Outpatient Medications   Medication Sig    albuterol (PROVENTIL) (2.5 MG/3ML) 0.083% nebulizer solution 2.5 mg = 3 mL, Inhalation, every 6 hours, 3 Refills, Dispense: 1,080, mL, [facility] (Patient taking differently: Take 3 mLs by nebulization every 4 hours as needed for Shortness of Breath 2.5 mg = 3 mL, Inhalation, every 6 hours, 3 Refills, Dispense: 1,080, mL, [facility] [address])    ferrous sulfate (IRON 325) 325 (65 Fe) MG tablet Take 1 tablet by mouth every morning (before breakfast)    butalbital-acetaminophen-caffeine (FIORICET, ESGIC) -40 MG per tablet Take 1 tablet by mouth every 4 hours as needed for Headaches (Patient not taking: Reported on 12/14/2023)    MAGNESIUM PO Take by mouth 3 times daily (Patient germs:  Hand washing is the most important thing you and your caregivers can do to prevent infections. Keep your bandage clean and dry! Do not touch your surgical wound. Use clean, freshly washed towels and washcloths every time you shower; do not share bath linens with others. Until your surgical wound is healed, wear clothing and sleep on bed linens each day that are clean and freshly washed. Do not allow pets to sleep in your bed with you or touch your surgical wound. Do not smoke - smoking delays wound healing. This may be a good time to stop smoking. If you have diabetes, it is important for you to manage your blood sugar levels properly before your surgery as well as after your surgery. Poorly managed blood sugar levels slow down wound healing and prevent you from healing completely. Day of Procedure    Please park in the parking deck or any designated visitor parking lot. Enter the facility through the Main Entrance of the hospital.  On the day of surgery, please provide the cell phone number of the person who will be waiting for you to the Patient Access representative at the time of registration. Masks are highly recommended in the hospital, but not required. Once your procedure and the immediate recovery period is completed, a nurse in the recovery area will contact your designated visitor to inform them of your room number or to otherwise review other pertinent information regarding your care. Social distancing practices are strongly encouraged in waiting areas and the cafeteria. The patient was contacted in person. She verbalized understanding of all instructions does not need reinforcement.   12/21/23

## 2023-12-15 NOTE — PERIOP NOTE
Lab results faxed to surgeon's office and abnormal labs were called to surgeon's office with PAT phone # for any questions.

## 2023-12-20 NOTE — H&P
placentation that may require hysterectomy at time of birth and requirement of  section for all future pregnancies depending on the myometrial defect during surgery. Discussed the options to manage this can be either uterine sparing or non sparing. Uterine sparing are UFE or laparoscopic myomectomy. UFE has >90% rate of decreasing menorrhagia and 60-90% decrease in bulk symptoms especially in the months after the procedure. Myomectomy removes the pathology, however is associated with an increase risk of blood loss and there is no assurance the fibroids will return. The definitive management for both options is a hysterectomy. Discussed this would be done minimally invasive, with likely morcellation given the size of the uterus. Discussed contained morcellation to decrease risk of potential malignant cell spread. Discussed risks of bleeding, postoperative chronic pain, infection, injury to surrounding tissue, DVT, ICU admission, nerve damage and potential need for further surgery. All questions and concerns were addressed. Pt previously elected for hysterectomy and was posted for this. HOWEVER today she desires abdominal myomectomy. Reviewed each in long detail and patient certain she wants to retain her uterus. Implications for future pregnancy reviewed. GIven inability to sample lining, recommended hysteroscopy, D&C at time of abdominal myomectomy. Pt verbalized understanding.     Surgery: changed* abdominal myomectomy, hysteroscopy, D&C  Consents signed today  D25.9: Leiomyoma of uterus, unspecified           Provider   Electronically Signed by: Andrea Warren MD

## 2023-12-21 ENCOUNTER — HOSPITAL ENCOUNTER (OUTPATIENT)
Facility: HOSPITAL | Age: 43
Setting detail: OBSERVATION
Discharge: HOME | End: 2023-12-24
Attending: OBSTETRICS & GYNECOLOGY | Admitting: OBSTETRICS & GYNECOLOGY
Payer: COMMERCIAL

## 2023-12-21 DIAGNOSIS — Z98.890 S/P MYOMECTOMY: Primary | ICD-10-CM

## 2023-12-21 LAB
HCG UR QL: NEGATIVE
HISTORY CHECK: NORMAL

## 2023-12-21 PROCEDURE — C9290 INJ, BUPIVACAINE LIPOSOME: HCPCS | Performed by: OBSTETRICS & GYNECOLOGY

## 2023-12-21 PROCEDURE — 2580000003 HC RX 258: Performed by: OBSTETRICS & GYNECOLOGY

## 2023-12-21 PROCEDURE — 6370000000 HC RX 637 (ALT 250 FOR IP): Performed by: OBSTETRICS & GYNECOLOGY

## 2023-12-21 PROCEDURE — 2580000003 HC RX 258: Performed by: ANESTHESIOLOGY

## 2023-12-21 PROCEDURE — 3600000014 HC SURGERY LEVEL 4 ADDTL 15MIN: Performed by: OBSTETRICS & GYNECOLOGY

## 2023-12-21 PROCEDURE — 3700000001 HC ADD 15 MINUTES (ANESTHESIA): Performed by: OBSTETRICS & GYNECOLOGY

## 2023-12-21 PROCEDURE — G0378 HOSPITAL OBSERVATION PER HR: HCPCS

## 2023-12-21 PROCEDURE — 6360000002 HC RX W HCPCS: Performed by: OBSTETRICS & GYNECOLOGY

## 2023-12-21 PROCEDURE — 3600000004 HC SURGERY LEVEL 4 BASE: Performed by: OBSTETRICS & GYNECOLOGY

## 2023-12-21 PROCEDURE — 81025 URINE PREGNANCY TEST: CPT

## 2023-12-21 PROCEDURE — 2709999900 HC NON-CHARGEABLE SUPPLY: Performed by: OBSTETRICS & GYNECOLOGY

## 2023-12-21 PROCEDURE — 6360000002 HC RX W HCPCS: Performed by: ANESTHESIOLOGY

## 2023-12-21 PROCEDURE — 7100000001 HC PACU RECOVERY - ADDTL 15 MIN: Performed by: OBSTETRICS & GYNECOLOGY

## 2023-12-21 PROCEDURE — C1765 ADHESION BARRIER: HCPCS | Performed by: OBSTETRICS & GYNECOLOGY

## 2023-12-21 PROCEDURE — 88305 TISSUE EXAM BY PATHOLOGIST: CPT

## 2023-12-21 PROCEDURE — 6360000002 HC RX W HCPCS

## 2023-12-21 PROCEDURE — 7100000000 HC PACU RECOVERY - FIRST 15 MIN: Performed by: OBSTETRICS & GYNECOLOGY

## 2023-12-21 PROCEDURE — 3700000000 HC ANESTHESIA ATTENDED CARE: Performed by: OBSTETRICS & GYNECOLOGY

## 2023-12-21 RX ORDER — SODIUM CHLORIDE 0.9 % (FLUSH) 0.9 %
5-40 SYRINGE (ML) INJECTION EVERY 12 HOURS SCHEDULED
Status: DISCONTINUED | OUTPATIENT
Start: 2023-12-21 | End: 2023-12-21 | Stop reason: HOSPADM

## 2023-12-21 RX ORDER — ONDANSETRON 2 MG/ML
4 INJECTION INTRAMUSCULAR; INTRAVENOUS EVERY 6 HOURS PRN
Status: DISCONTINUED | OUTPATIENT
Start: 2023-12-21 | End: 2023-12-24 | Stop reason: HOSPADM

## 2023-12-21 RX ORDER — HYDRALAZINE HYDROCHLORIDE 20 MG/ML
10 INJECTION INTRAMUSCULAR; INTRAVENOUS
Status: DISCONTINUED | OUTPATIENT
Start: 2023-12-21 | End: 2023-12-21 | Stop reason: HOSPADM

## 2023-12-21 RX ORDER — OXYCODONE HYDROCHLORIDE 5 MG/1
5 TABLET ORAL
Status: DISCONTINUED | OUTPATIENT
Start: 2023-12-21 | End: 2023-12-21 | Stop reason: HOSPADM

## 2023-12-21 RX ORDER — CELECOXIB 200 MG/1
200 CAPSULE ORAL
Status: COMPLETED | OUTPATIENT
Start: 2023-12-21 | End: 2023-12-21

## 2023-12-21 RX ORDER — MAGNESIUM HYDROXIDE/ALUMINUM HYDROXICE/SIMETHICONE 120; 1200; 1200 MG/30ML; MG/30ML; MG/30ML
30 SUSPENSION ORAL EVERY 6 HOURS PRN
Status: DISCONTINUED | OUTPATIENT
Start: 2023-12-21 | End: 2023-12-24 | Stop reason: HOSPADM

## 2023-12-21 RX ORDER — LANOLIN ALCOHOL/MO/W.PET/CERES
3 CREAM (GRAM) TOPICAL NIGHTLY PRN
Status: DISCONTINUED | OUTPATIENT
Start: 2023-12-21 | End: 2023-12-24 | Stop reason: HOSPADM

## 2023-12-21 RX ORDER — ACETAMINOPHEN 500 MG
1000 TABLET ORAL ONCE
Status: DISCONTINUED | OUTPATIENT
Start: 2023-12-21 | End: 2023-12-21 | Stop reason: HOSPADM

## 2023-12-21 RX ORDER — PROCHLORPERAZINE EDISYLATE 5 MG/ML
5 INJECTION INTRAMUSCULAR; INTRAVENOUS
Status: DISCONTINUED | OUTPATIENT
Start: 2023-12-21 | End: 2023-12-21 | Stop reason: HOSPADM

## 2023-12-21 RX ORDER — SODIUM CHLORIDE 0.9 % (FLUSH) 0.9 %
5-40 SYRINGE (ML) INJECTION EVERY 12 HOURS SCHEDULED
Status: DISCONTINUED | OUTPATIENT
Start: 2023-12-21 | End: 2023-12-24 | Stop reason: HOSPADM

## 2023-12-21 RX ORDER — HYDROMORPHONE HYDROCHLORIDE 1 MG/ML
0.5 INJECTION, SOLUTION INTRAMUSCULAR; INTRAVENOUS; SUBCUTANEOUS
Status: DISCONTINUED | OUTPATIENT
Start: 2023-12-21 | End: 2023-12-24 | Stop reason: HOSPADM

## 2023-12-21 RX ORDER — ENOXAPARIN SODIUM 100 MG/ML
30 INJECTION SUBCUTANEOUS 2 TIMES DAILY
Status: DISCONTINUED | OUTPATIENT
Start: 2023-12-22 | End: 2023-12-24 | Stop reason: HOSPADM

## 2023-12-21 RX ORDER — SODIUM CHLORIDE 9 MG/ML
INJECTION, SOLUTION INTRAVENOUS PRN
Status: DISCONTINUED | OUTPATIENT
Start: 2023-12-21 | End: 2023-12-24 | Stop reason: HOSPADM

## 2023-12-21 RX ORDER — ACETAMINOPHEN 500 MG
1000 TABLET ORAL EVERY 8 HOURS SCHEDULED
Status: DISCONTINUED | OUTPATIENT
Start: 2023-12-21 | End: 2023-12-24 | Stop reason: HOSPADM

## 2023-12-21 RX ORDER — SODIUM CHLORIDE 9 MG/ML
INJECTION, SOLUTION INTRAVENOUS PRN
Status: DISCONTINUED | OUTPATIENT
Start: 2023-12-21 | End: 2023-12-21 | Stop reason: HOSPADM

## 2023-12-21 RX ORDER — SODIUM CHLORIDE 0.9 % (FLUSH) 0.9 %
5-40 SYRINGE (ML) INJECTION PRN
Status: DISCONTINUED | OUTPATIENT
Start: 2023-12-21 | End: 2023-12-21 | Stop reason: HOSPADM

## 2023-12-21 RX ORDER — KETOROLAC TROMETHAMINE 30 MG/ML
30 INJECTION, SOLUTION INTRAMUSCULAR; INTRAVENOUS EVERY 6 HOURS
Status: DISCONTINUED | OUTPATIENT
Start: 2023-12-21 | End: 2023-12-24 | Stop reason: HOSPADM

## 2023-12-21 RX ORDER — HYDROMORPHONE HYDROCHLORIDE 1 MG/ML
0.5 INJECTION, SOLUTION INTRAMUSCULAR; INTRAVENOUS; SUBCUTANEOUS EVERY 5 MIN PRN
Status: COMPLETED | OUTPATIENT
Start: 2023-12-21 | End: 2023-12-21

## 2023-12-21 RX ORDER — ACETAMINOPHEN 500 MG
1000 TABLET ORAL
Status: COMPLETED | OUTPATIENT
Start: 2023-12-21 | End: 2023-12-21

## 2023-12-21 RX ORDER — LIDOCAINE HYDROCHLORIDE 10 MG/ML
1 INJECTION, SOLUTION EPIDURAL; INFILTRATION; INTRACAUDAL; PERINEURAL
Status: DISCONTINUED | OUTPATIENT
Start: 2023-12-21 | End: 2023-12-21 | Stop reason: HOSPADM

## 2023-12-21 RX ORDER — OXYCODONE HYDROCHLORIDE 5 MG/1
5 TABLET ORAL EVERY 4 HOURS PRN
Status: DISCONTINUED | OUTPATIENT
Start: 2023-12-21 | End: 2023-12-24 | Stop reason: HOSPADM

## 2023-12-21 RX ORDER — OXYCODONE HYDROCHLORIDE 5 MG/1
10 TABLET ORAL EVERY 4 HOURS PRN
Status: DISCONTINUED | OUTPATIENT
Start: 2023-12-21 | End: 2023-12-24 | Stop reason: HOSPADM

## 2023-12-21 RX ORDER — ONDANSETRON 2 MG/ML
4 INJECTION INTRAMUSCULAR; INTRAVENOUS
Status: DISCONTINUED | OUTPATIENT
Start: 2023-12-21 | End: 2023-12-21 | Stop reason: HOSPADM

## 2023-12-21 RX ORDER — METRONIDAZOLE 500 MG/100ML
500 INJECTION, SOLUTION INTRAVENOUS
Status: COMPLETED | OUTPATIENT
Start: 2023-12-21 | End: 2023-12-21

## 2023-12-21 RX ORDER — FENTANYL CITRATE 50 UG/ML
INJECTION, SOLUTION INTRAMUSCULAR; INTRAVENOUS
Status: COMPLETED
Start: 2023-12-21 | End: 2023-12-21

## 2023-12-21 RX ORDER — SODIUM CHLORIDE 0.9 % (FLUSH) 0.9 %
5-40 SYRINGE (ML) INJECTION PRN
Status: DISCONTINUED | OUTPATIENT
Start: 2023-12-21 | End: 2023-12-24 | Stop reason: HOSPADM

## 2023-12-21 RX ORDER — MIDAZOLAM HYDROCHLORIDE 2 MG/2ML
2 INJECTION, SOLUTION INTRAMUSCULAR; INTRAVENOUS
Status: DISCONTINUED | OUTPATIENT
Start: 2023-12-21 | End: 2023-12-21 | Stop reason: HOSPADM

## 2023-12-21 RX ORDER — FENTANYL CITRATE 50 UG/ML
100 INJECTION, SOLUTION INTRAMUSCULAR; INTRAVENOUS
Status: DISCONTINUED | OUTPATIENT
Start: 2023-12-21 | End: 2023-12-21 | Stop reason: HOSPADM

## 2023-12-21 RX ORDER — SODIUM CHLORIDE, SODIUM LACTATE, POTASSIUM CHLORIDE, CALCIUM CHLORIDE 600; 310; 30; 20 MG/100ML; MG/100ML; MG/100ML; MG/100ML
INJECTION, SOLUTION INTRAVENOUS CONTINUOUS
Status: DISCONTINUED | OUTPATIENT
Start: 2023-12-21 | End: 2023-12-21 | Stop reason: HOSPADM

## 2023-12-21 RX ORDER — VASOPRESSIN 20 U/ML
INJECTION PARENTERAL PRN
Status: DISCONTINUED | OUTPATIENT
Start: 2023-12-21 | End: 2023-12-21 | Stop reason: HOSPADM

## 2023-12-21 RX ORDER — FENTANYL CITRATE 50 UG/ML
25 INJECTION, SOLUTION INTRAMUSCULAR; INTRAVENOUS EVERY 5 MIN PRN
Status: COMPLETED | OUTPATIENT
Start: 2023-12-21 | End: 2023-12-21

## 2023-12-21 RX ORDER — SODIUM CHLORIDE 9 MG/ML
INJECTION, SOLUTION INTRAVENOUS CONTINUOUS
Status: DISCONTINUED | OUTPATIENT
Start: 2023-12-21 | End: 2023-12-24 | Stop reason: HOSPADM

## 2023-12-21 RX ORDER — BISACODYL 5 MG/1
5 TABLET, DELAYED RELEASE ORAL DAILY
Status: DISCONTINUED | OUTPATIENT
Start: 2023-12-21 | End: 2023-12-24 | Stop reason: HOSPADM

## 2023-12-21 RX ORDER — ONDANSETRON 4 MG/1
4 TABLET, ORALLY DISINTEGRATING ORAL EVERY 8 HOURS PRN
Status: DISCONTINUED | OUTPATIENT
Start: 2023-12-21 | End: 2023-12-24 | Stop reason: HOSPADM

## 2023-12-21 RX ADMIN — SODIUM CHLORIDE: 9 INJECTION, SOLUTION INTRAVENOUS at 20:57

## 2023-12-21 RX ADMIN — FENTANYL CITRATE 25 MCG: 50 INJECTION INTRAMUSCULAR; INTRAVENOUS at 21:44

## 2023-12-21 RX ADMIN — FENTANYL CITRATE 25 MCG: 50 INJECTION INTRAMUSCULAR; INTRAVENOUS at 21:21

## 2023-12-21 RX ADMIN — KETOROLAC TROMETHAMINE 30 MG: 30 INJECTION, SOLUTION INTRAMUSCULAR at 20:38

## 2023-12-21 RX ADMIN — SODIUM CHLORIDE, POTASSIUM CHLORIDE, SODIUM LACTATE AND CALCIUM CHLORIDE: 600; 310; 30; 20 INJECTION, SOLUTION INTRAVENOUS at 14:59

## 2023-12-21 RX ADMIN — FENTANYL CITRATE 25 MCG: 50 INJECTION INTRAMUSCULAR; INTRAVENOUS at 21:01

## 2023-12-21 RX ADMIN — HYDROMORPHONE HYDROCHLORIDE 0.5 MG: 1 INJECTION, SOLUTION INTRAMUSCULAR; INTRAVENOUS; SUBCUTANEOUS at 20:50

## 2023-12-21 RX ADMIN — ACETAMINOPHEN 1000 MG: 500 TABLET ORAL at 23:34

## 2023-12-21 RX ADMIN — FENTANYL CITRATE 25 MCG: 50 INJECTION INTRAMUSCULAR; INTRAVENOUS at 21:29

## 2023-12-21 RX ADMIN — ACETAMINOPHEN 1000 MG: 500 TABLET ORAL at 14:35

## 2023-12-21 RX ADMIN — CELECOXIB 200 MG: 200 CAPSULE ORAL at 14:35

## 2023-12-21 RX ADMIN — HYDROMORPHONE HYDROCHLORIDE 0.5 MG: 1 INJECTION, SOLUTION INTRAMUSCULAR; INTRAVENOUS; SUBCUTANEOUS at 21:43

## 2023-12-21 RX ADMIN — OXYCODONE 5 MG: 5 TABLET ORAL at 23:37

## 2023-12-21 NOTE — INTERVAL H&P NOTE
Met and examined the patient. Agreed to proceed with the scheduled surgery. I reviewed  the risks of surgery, the risks include infection, bleeding,  damage to organs including bowel, bladder, ureters, vessels, possible need for hysterectomy, blood transfusion, and future surgery to repair undiagnosed damage to organs. Questions were answered. Consent was signed.

## 2023-12-22 LAB
HCT VFR BLD AUTO: 26.6 % (ref 35–47)
HGB BLD-MCNC: 8.1 G/DL (ref 11.5–16)

## 2023-12-22 PROCEDURE — 2580000003 HC RX 258: Performed by: OBSTETRICS & GYNECOLOGY

## 2023-12-22 PROCEDURE — 6360000002 HC RX W HCPCS: Performed by: OBSTETRICS & GYNECOLOGY

## 2023-12-22 PROCEDURE — G0378 HOSPITAL OBSERVATION PER HR: HCPCS

## 2023-12-22 PROCEDURE — 85014 HEMATOCRIT: CPT

## 2023-12-22 PROCEDURE — 94761 N-INVAS EAR/PLS OXIMETRY MLT: CPT

## 2023-12-22 PROCEDURE — 85018 HEMOGLOBIN: CPT

## 2023-12-22 PROCEDURE — 96372 THER/PROPH/DIAG INJ SC/IM: CPT

## 2023-12-22 PROCEDURE — 96374 THER/PROPH/DIAG INJ IV PUSH: CPT

## 2023-12-22 PROCEDURE — 6370000000 HC RX 637 (ALT 250 FOR IP): Performed by: OBSTETRICS & GYNECOLOGY

## 2023-12-22 PROCEDURE — 36415 COLL VENOUS BLD VENIPUNCTURE: CPT

## 2023-12-22 PROCEDURE — 96376 TX/PRO/DX INJ SAME DRUG ADON: CPT

## 2023-12-22 RX ORDER — IBUPROFEN 600 MG/1
600 TABLET ORAL 4 TIMES DAILY PRN
Qty: 40 TABLET | Refills: 0 | Status: SHIPPED | OUTPATIENT
Start: 2023-12-22

## 2023-12-22 RX ORDER — ALBUTEROL SULFATE 2.5 MG/3ML
2.5 SOLUTION RESPIRATORY (INHALATION) EVERY 6 HOURS PRN
Status: DISCONTINUED | OUTPATIENT
Start: 2023-12-22 | End: 2023-12-24 | Stop reason: HOSPADM

## 2023-12-22 RX ORDER — OXYCODONE HYDROCHLORIDE 5 MG/1
5 TABLET ORAL EVERY 6 HOURS PRN
Qty: 20 TABLET | Refills: 0 | Status: SHIPPED | OUTPATIENT
Start: 2023-12-22 | End: 2023-12-29

## 2023-12-22 RX ADMIN — ACETAMINOPHEN 1000 MG: 500 TABLET ORAL at 22:37

## 2023-12-22 RX ADMIN — ACETAMINOPHEN 1000 MG: 500 TABLET ORAL at 15:10

## 2023-12-22 RX ADMIN — BISACODYL 5 MG: 5 TABLET, COATED ORAL at 09:44

## 2023-12-22 RX ADMIN — ENOXAPARIN SODIUM 30 MG: 100 INJECTION SUBCUTANEOUS at 21:05

## 2023-12-22 RX ADMIN — SODIUM CHLORIDE: 9 INJECTION, SOLUTION INTRAVENOUS at 11:30

## 2023-12-22 RX ADMIN — ENOXAPARIN SODIUM 30 MG: 100 INJECTION SUBCUTANEOUS at 09:48

## 2023-12-22 RX ADMIN — KETOROLAC TROMETHAMINE 30 MG: 30 INJECTION, SOLUTION INTRAMUSCULAR at 15:10

## 2023-12-22 RX ADMIN — KETOROLAC TROMETHAMINE 30 MG: 30 INJECTION, SOLUTION INTRAMUSCULAR at 02:03

## 2023-12-22 RX ADMIN — NALOXEGOL OXALATE 12.5 MG: 12.5 TABLET, FILM COATED ORAL at 09:44

## 2023-12-22 RX ADMIN — KETOROLAC TROMETHAMINE 30 MG: 30 INJECTION, SOLUTION INTRAMUSCULAR at 09:35

## 2023-12-22 RX ADMIN — ACETAMINOPHEN 1000 MG: 500 TABLET ORAL at 05:53

## 2023-12-22 RX ADMIN — SODIUM CHLORIDE, PRESERVATIVE FREE 10 ML: 5 INJECTION INTRAVENOUS at 09:52

## 2023-12-22 RX ADMIN — KETOROLAC TROMETHAMINE 30 MG: 30 INJECTION, SOLUTION INTRAMUSCULAR at 21:02

## 2023-12-22 RX ADMIN — OXYCODONE 5 MG: 5 TABLET ORAL at 19:10

## 2023-12-22 RX ADMIN — OXYCODONE 5 MG: 5 TABLET ORAL at 04:11

## 2023-12-22 RX ADMIN — OXYCODONE 5 MG: 5 TABLET ORAL at 11:25

## 2023-12-22 NOTE — PROGRESS NOTES
TRANSFER - IN REPORT:    Verbal report received from BRYSON PLUMMER on Susi Sharma  being received from PACU for routine post-op      Report consisted of patient's Situation, Background, Assessment and   Recommendations(SBAR). Information from the following report(s) Nurse Handoff Report, Index, Adult Overview, MAR, and Recent Results was reviewed with the receiving nurse. Opportunity for questions and clarification was provided. Assessment completed upon patient's arrival to unit and care assumed.

## 2023-12-22 NOTE — OP NOTE
Op NOTE      Lowanda Odor    DATE OF PROCEDURE:  23     PREOPERATIVE DIAGNOSIS:  Uterine leiomyoma, unspecified location [D25.9], anemia    POSTOPERATIVE DIAGNOSIS:  Same as above    PROCEDURE: Hysteroscopy, dilatation & curettage, abdominal myomectomy     SURGEON:  Mary Velasquez MD    ASSISTANT:  Neeta Reed MD    Complications: none    ANESTHESIA: Grace Patterson    EBL:  250cc    FINDINGS: Hysteroscopy with atrophic endometrium. Bilateral ostia visualized bilaterally. Abdominal myomectomy with 12cm fundal subserosal and two 6cm intramural fibroids, one left anterior and the other right posterior. Hemostasis excellent. No endometrial cavity defects. Not a candidate for vaginal birth. Specimen:  Endometrial curettings and uterine fibroids      PROCEDURE: PROCEDURE: Patient was placed on the operating table in the supine position. Time out was done to confirm the operating procedure, surgeon, patient and site. Once confirmed by the team, procedure was started. Patient was placed under general. She was prepped and draped in the usual fashion for vaginal surgery. Cervix was visualized with the aid of a sidearm speculum and grasped with a single-tooth tenaculum. The uterus was then sounded to 10cm. The cervix was then dilated to 23-Tamazight. The diagnostic hysteroscope was then placed in the endometrial cavity. Findings were noted as above. Thorough endometrial curettage was performed with endometrial curettings being sent for histologic review, using a medium sharp curette,  Until good uterine cry is noted in all quadrants. Tissue was sent to pathology. There was no bleeding. Instruments were removed. Hemostasis excellent. Attention was then turned to the abdomen. A vertical midline incision was made approximately 3cm below the umbilicus to the previous  scar. This was taken down to the level of the fascia which was incised sharply with the knife.   This was

## 2023-12-23 PROCEDURE — 6360000002 HC RX W HCPCS: Performed by: OBSTETRICS & GYNECOLOGY

## 2023-12-23 PROCEDURE — G0378 HOSPITAL OBSERVATION PER HR: HCPCS

## 2023-12-23 PROCEDURE — 6370000000 HC RX 637 (ALT 250 FOR IP): Performed by: OBSTETRICS & GYNECOLOGY

## 2023-12-23 PROCEDURE — 96372 THER/PROPH/DIAG INJ SC/IM: CPT

## 2023-12-23 PROCEDURE — 96376 TX/PRO/DX INJ SAME DRUG ADON: CPT

## 2023-12-23 PROCEDURE — 2580000003 HC RX 258: Performed by: OBSTETRICS & GYNECOLOGY

## 2023-12-23 RX ADMIN — KETOROLAC TROMETHAMINE 30 MG: 30 INJECTION, SOLUTION INTRAMUSCULAR at 08:42

## 2023-12-23 RX ADMIN — ACETAMINOPHEN 1000 MG: 500 TABLET ORAL at 06:40

## 2023-12-23 RX ADMIN — OXYCODONE 5 MG: 5 TABLET ORAL at 18:44

## 2023-12-23 RX ADMIN — ACETAMINOPHEN 1000 MG: 500 TABLET ORAL at 13:43

## 2023-12-23 RX ADMIN — SODIUM CHLORIDE, PRESERVATIVE FREE 10 ML: 5 INJECTION INTRAVENOUS at 08:43

## 2023-12-23 RX ADMIN — ENOXAPARIN SODIUM 30 MG: 100 INJECTION SUBCUTANEOUS at 20:47

## 2023-12-23 RX ADMIN — ACETAMINOPHEN 1000 MG: 500 TABLET ORAL at 21:54

## 2023-12-23 RX ADMIN — NALOXEGOL OXALATE 12.5 MG: 12.5 TABLET, FILM COATED ORAL at 08:42

## 2023-12-23 RX ADMIN — KETOROLAC TROMETHAMINE 30 MG: 30 INJECTION, SOLUTION INTRAMUSCULAR at 20:46

## 2023-12-23 RX ADMIN — SERTRALINE HYDROCHLORIDE 50 MG: 50 TABLET ORAL at 13:49

## 2023-12-23 RX ADMIN — BISACODYL 5 MG: 5 TABLET, COATED ORAL at 08:42

## 2023-12-23 RX ADMIN — KETOROLAC TROMETHAMINE 30 MG: 30 INJECTION, SOLUTION INTRAMUSCULAR at 02:49

## 2023-12-23 RX ADMIN — ENOXAPARIN SODIUM 30 MG: 100 INJECTION SUBCUTANEOUS at 08:43

## 2023-12-23 RX ADMIN — OXYCODONE 5 MG: 5 TABLET ORAL at 11:57

## 2023-12-23 NOTE — PROGRESS NOTES
0800: Bedside and Verbal shift change report given to SAGRARIO (oncoming nurse) by BENJAMIN COOK (offgoing nurse). Report included the following information Nurse Handoff Report. 0815: Encouraged pt to ambulate in hallway and use incentive spirometer. Pt verbalized understanding.

## 2023-12-24 VITALS
HEIGHT: 64 IN | OXYGEN SATURATION: 95 % | SYSTOLIC BLOOD PRESSURE: 150 MMHG | DIASTOLIC BLOOD PRESSURE: 88 MMHG | TEMPERATURE: 99 F | RESPIRATION RATE: 16 BRPM | HEART RATE: 88 BPM | BODY MASS INDEX: 48.89 KG/M2 | WEIGHT: 286.38 LBS

## 2023-12-24 PROCEDURE — 2580000003 HC RX 258: Performed by: OBSTETRICS & GYNECOLOGY

## 2023-12-24 PROCEDURE — 6360000002 HC RX W HCPCS: Performed by: OBSTETRICS & GYNECOLOGY

## 2023-12-24 PROCEDURE — 96372 THER/PROPH/DIAG INJ SC/IM: CPT

## 2023-12-24 PROCEDURE — 6370000000 HC RX 637 (ALT 250 FOR IP): Performed by: OBSTETRICS & GYNECOLOGY

## 2023-12-24 PROCEDURE — 96376 TX/PRO/DX INJ SAME DRUG ADON: CPT

## 2023-12-24 PROCEDURE — G0378 HOSPITAL OBSERVATION PER HR: HCPCS

## 2023-12-24 RX ADMIN — ACETAMINOPHEN 1000 MG: 500 TABLET ORAL at 06:08

## 2023-12-24 RX ADMIN — KETOROLAC TROMETHAMINE 30 MG: 30 INJECTION, SOLUTION INTRAMUSCULAR at 10:06

## 2023-12-24 RX ADMIN — BISACODYL 5 MG: 5 TABLET, COATED ORAL at 10:05

## 2023-12-24 RX ADMIN — NALOXEGOL OXALATE 12.5 MG: 12.5 TABLET, FILM COATED ORAL at 10:04

## 2023-12-24 RX ADMIN — SERTRALINE HYDROCHLORIDE 50 MG: 50 TABLET ORAL at 10:05

## 2023-12-24 RX ADMIN — SODIUM CHLORIDE, PRESERVATIVE FREE 10 ML: 5 INJECTION INTRAVENOUS at 10:14

## 2023-12-24 RX ADMIN — ENOXAPARIN SODIUM 30 MG: 100 INJECTION SUBCUTANEOUS at 10:05

## 2023-12-24 NOTE — DISCHARGE SUMMARY
Gynecology Discharge Summary     Patient ID:  Beth Small  [id number]  55 y.o.  1980    Admit date: 12/21/2023    Discharge date: 12/24/2023     Admission Diagnoses:   Patient Active Problem List   Diagnosis    Iron deficiency anemia    Obstructive sleep apnea    Asthma    COPD (chronic obstructive pulmonary disease) ([address])    Mixed hyperlipidemia    Arthritis    H/O gastric bypass    Liver mass    Osteoarthrosis, localized, primary, knee    Mild intermittent asthma    Gastroesophageal reflux disease without esophagitis    Allergic rhinitis    Severe anemia    Class 3 severe obesity due to excess calories with serious comorbidity in [facility])    S/P myomectomy       Discharge Diagnoses: There are no discharge diagnoses documented for the most recent discharge. Patient Active Problem List   Diagnosis    Iron deficiency anemia    Obstructive sleep apnea    Asthma    COPD (chronic obstructive pulmonary disease) ([facility])    Mixed hyperlipidemia    Arthritis    H/O gastric bypass    Liver mass    Osteoarthrosis, localized, primary, knee    Mild intermittent asthma    Gastroesophageal reflux disease without esophagitis    Allergic rhinitis    Severe anemia    Class 3 severe obesity due to excess calories with serious comorbidity in [facility])    S/P myomectomy       Procedures for this admission: Procedure(s):  ABDOMINAL MYOMECTOMY,  Hysteroscopy [facility]     Hospital Course: Pt did well postoperatively and was discharged home on POD3.      Disposition: Home or self care    Discharged Condition: stable            Patient Instructions:   Current Discharge Medication List        START taking these medications    Details   sertraline (ZOLOFT) 50 MG tablet Take 1 tablet by mouth daily  Qty: 30 tablet, Refills: 3      ibuprofen (ADVIL;MOTRIN) 600 MG tablet Take 1 tablet by mouth 4 times daily as needed for Pain  Qty: 40 tablet, Refills: 0      oxyCODONE (ROXICODONE) 5 MG immediate release tablet Take 1 tablet by mouth every
3 days. Take lowest dose possible to manage pain Max Daily Amount: 20 mg  Qty: 20 tablet, Refills: 0    Comments: Reduce doses taken as pain becomes manageable  Associated Diagnoses: S/P myomectomy           CONTINUE these medications which have NOT CHANGED    Details   albuterol (PROVENTIL) (2.5 MG/3ML) 0.083% nebulizer solution 2.5 mg = 3 mL, Inhalation, every 6 hours, 3 Refills, Dispense: 1,080, mL, [facility]  Qty: 120 each, Refills: 2      ferrous sulfate (IRON 325) 325 (65 Fe) MG tablet Take 1 tablet by mouth every morning (before breakfast)  Qty: 30 tablet, Refills: 3      butalbital-acetaminophen-caffeine (FIORICET, ESGIC) -40 MG per tablet Take 1 tablet by mouth every 4 hours as needed for Headaches  Qty: 14 tablet, Refills: 0      MAGNESIUM PO Take by mouth 3 times daily      albuterol sulfate HFA (PROVENTIL;VENTOLIN;PROAIR) 108 (90 Base) MCG/ACT inhaler Inhale 2 puffs into the lungs every 4 hours as needed for Shortness of Breath      vitamin D3 (CHOLECALCIFEROL) 125 MCG (5000 UT) TABS tablet Take 1 tablet by mouth daily      cyanocobalamin 1000 MCG tablet Take 1 tablet by mouth daily      fluticasone-salmeterol (ADVAIR DISKUS) 500-50 MCG/ACT AEPB diskus inhaler Inhale 1 puff into the lungs in the morning and 1 puff in the evening.      montelukast (SINGULAIR) 10 MG tablet Take 1 tablet by mouth daily           Activity: activity as tolerated, no lifting, Driving, or Strenuous exercise for 6, no heavy lifting, pushing, pulling with the implant side for 2 months, no sex for 6 weeks, and no heavy lifting for 6 weeks  Diet: regular diet  Wound Care: keep wound clean and dry    Follow-up with [facility] in 2 weeks.     Signed:  Mary Velasquez MD  12/22/2023  9:39 AM

## 2023-12-24 NOTE — PROGRESS NOTES
Bedside shift change report given to Violet Ugalde RN (oncoming nurse) by Jessica Hernandez RN (offgoing nurse). Report included the following information Nurse Handoff Report, Intake/Output, MAR, and Recent Results.

## 2024-02-14 ENCOUNTER — TELEPHONE (OUTPATIENT)
Age: 44
End: 2024-02-14

## 2024-03-02 NOTE — TELEPHONE ENCOUNTER
See office note for today. Hospital Bundle    Fluids: PO  Electrolytes: Replete K > 4, Mg > 2, Phos > 3  Nutrition: Diet NPO for possible GI procedure  PPX  ---VTE: hold iso acute bleed. can consider SCD but acute DVT in L leg   ---GI: IV PPI BID  ---Resp: n/a  Access: PIV  Code Status: FULL CODE; would engage in further GoC  Dispo: pending clinical improvement, likely return to [facility] if able

## 2024-04-29 ENCOUNTER — TELEPHONE (OUTPATIENT)
Age: 44
End: 2024-04-29

## 2024-04-29 NOTE — TELEPHONE ENCOUNTER
Patient called to schedule an appt with Dr. Cruz to discuss a possible revision due to weight gain.

## 2024-05-07 ENCOUNTER — OFFICE VISIT (OUTPATIENT)
Age: 44
End: 2024-05-07
Payer: COMMERCIAL

## 2024-05-07 VITALS
RESPIRATION RATE: 20 BRPM | DIASTOLIC BLOOD PRESSURE: 80 MMHG | BODY MASS INDEX: 50.02 KG/M2 | TEMPERATURE: 98.4 F | HEART RATE: 72 BPM | WEIGHT: 293 LBS | SYSTOLIC BLOOD PRESSURE: 118 MMHG | HEIGHT: 64 IN | OXYGEN SATURATION: 99 %

## 2024-05-07 DIAGNOSIS — E66.01 MORBID OBESITY (HCC): ICD-10-CM

## 2024-05-07 DIAGNOSIS — K21.9 GASTROESOPHAGEAL REFLUX DISEASE WITHOUT ESOPHAGITIS: ICD-10-CM

## 2024-05-07 DIAGNOSIS — R63.5 WEIGHT GAIN, ABNORMAL: Primary | ICD-10-CM

## 2024-05-07 PROCEDURE — 99213 OFFICE O/P EST LOW 20 MIN: CPT | Performed by: SURGERY

## 2024-05-07 ASSESSMENT — PATIENT HEALTH QUESTIONNAIRE - PHQ9
SUM OF ALL RESPONSES TO PHQ QUESTIONS 1-9: 0
SUM OF ALL RESPONSES TO PHQ QUESTIONS 1-9: 0
SUM OF ALL RESPONSES TO PHQ9 QUESTIONS 1 & 2: 0
SUM OF ALL RESPONSES TO PHQ QUESTIONS 1-9: 0
1. LITTLE INTEREST OR PLEASURE IN DOING THINGS: NOT AT ALL
2. FEELING DOWN, DEPRESSED OR HOPELESS: NOT AT ALL
SUM OF ALL RESPONSES TO PHQ QUESTIONS 1-9: 0

## 2024-05-07 NOTE — PROGRESS NOTES
Identified patient with two patient identifiers (name and ). Reviewed chart in preparation for visit and have obtained necessary documentation.    Maggie Lin is a 43 y.o. female  Chief Complaint   Patient presents with    Weight Management     7 1/2 years s/p lap sleeve gastrectomy     /80 (Site: Right Upper Arm, Position: Sitting, Cuff Size: Large Adult)   Pulse 72   Temp 98.4 °F (36.9 °C)   Resp 20   Ht 1.626 m (5' 4\")   Wt 133.8 kg (295 lb)   SpO2 99%   BMI 50.64 kg/m²     1. Have you been to the ER, urgent care clinic since your last visit?  Hospitalized since your last visit?Yes had blood transfusions and had fibroids removed    2. Have you seen or consulted any other health care providers outside of the [facility] since your last visit?  Include any pap smears or colon screening. no

## 2024-05-10 NOTE — PROGRESS NOTES
Subjective:     The patient is a 43 y.o. obese female status-post laparoscopic sleeve gastrectomy in 2016.  Body mass index is 50.64 kg/m².   Maggie Lin has tried multiple diets in her  lifetime most recently trying unsupervised diets during which she was able to lose small amounts of weight and then gained it back plus more. She exercises at gym 5 days weekly, avoids liquid calories and fast-fried foods. She underwent myomectomy 12/2023 for management of severe anemia. She notes mild GERD symptoms.    Bariatric comorbidities present are   Past Medical History:   Diagnosis Date    Arthritis     Asthma     [facility]    Cerebral artery occlusion with cerebral infarction (HCC) 2016    TIA, NO RESIDUAL    Morbid obesity (HCC)     Sleep apnea     DOES NOT WEAR HER CPAP       Patient Active Problem List    Diagnosis Date Noted    S/P myomectomy 12/21/2023    Class 3 severe obesity due to excess calories with serious comorbidity in adult (HCC) 09/15/2023    Severe anemia 09/02/2023    Mild intermittent asthma 04/11/2023    Gastroesophageal reflux disease without esophagitis 04/11/2023    Allergic rhinitis 04/11/2023    Liver mass 06/21/2022    H/O gastric bypass 06/09/2022    Mixed hyperlipidemia 10/19/2021    Iron deficiency anemia 08/12/2020    Osteoarthrosis, localized, primary, knee 09/20/2017    Obstructive sleep apnea 02/15/2012    Arthritis 02/15/2012    Asthma 09/08/2011    COPD (chronic obstructive pulmonary disease) (HCC) 09/08/2011     Past Medical History:   Diagnosis Date    Arthritis     Asthma     [facility]    Cerebral artery occlusion with cerebral infarction (HCC) 2016    TIA, NO RESIDUAL    Morbid obesity (HCC)     Sleep apnea     DOES NOT WEAR HER CPAP      Past Surgical History:   Procedure Laterality Date    ENDOSCOPY, COLON, DIAGNOSTIC      GASTRECTOMY  12/29/2016    lap sleeve gastrectomy by Dr Chidi Cruz    GYN  t-14    d and c post miscarriage    GYN  2008    c section

## 2024-05-20 ENCOUNTER — HOSPITAL ENCOUNTER (OUTPATIENT)
Facility: HOSPITAL | Age: 44
Discharge: HOME | End: 2024-05-23
Attending: SURGERY
Payer: COMMERCIAL

## 2024-05-20 DIAGNOSIS — K21.9 GASTROESOPHAGEAL REFLUX DISEASE WITHOUT ESOPHAGITIS: ICD-10-CM

## 2024-05-20 PROCEDURE — 74240 X-RAY XM UPR GI TRC 1CNTRST: CPT

## 2024-05-31 ENCOUNTER — OFFICE VISIT (OUTPATIENT)
Age: 44
End: 2024-05-31

## 2024-05-31 DIAGNOSIS — E66.01 MORBID OBESITY (HCC): Primary | ICD-10-CM

## 2024-05-31 NOTE — PROGRESS NOTES
Post-Operative Bariatric Nutrition Counseling - Phone Consult     Physician/Surgeon:Chidi Cruz M.D.   Name: Maggie Lin  : 1980        Reason for visit: Follow up nutrition education and counseling post sleeve gastrectomy      ASSESSMENT:  Date of surgery:    Medications/Supplements:   Prior to Admission medications    Medication Sig Start Date End Date Taking? Authorizing Provider   albuterol (PROVENTIL) (2.5 MG/3ML) 0.083% nebulizer solution 2.5 mg = 3 mL, Inhalation, every 6 hours, 3 Refills, Dispense: 1,080, mL, [id number]  Patient taking differently: Take 3 mLs by nebulization every 4 hours as needed for Shortness of Breath 2.5 mg = 3 mL, Inhalation, every 6 hours, 3 Refills, Dispense: 1,080, mL, [id number] 9/15/23   Patito Whitehead MD   ferrous sulfate (IRON 325) 325 (65 Fe) MG tablet Take 1 tablet by mouth every morning (before breakfast) 23   Charles Christina, APRN - NP   butalbital-acetaminophen-caffeine (FIORICET, ESGIC) -40 MG per tablet Take 1 tablet by mouth every 4 hours as needed for Headaches  Patient not taking: Reported on 2023 9/4/23 10/4/23  Charles Christina, APRN - NP   MAGNESIUM PO Take by mouth 3 times daily    Automatic Reconciliation, Ar   albuterol sulfate HFA (PROVENTIL;VENTOLIN;PROAIR) 108 (90 Base) MCG/ACT inhaler Inhale 2 puffs into the lungs every 4 hours as needed for Shortness of Breath 3/6/17   Automatic Reconciliation, Ar   vitamin D3 (CHOLECALCIFEROL) 125 MCG (5000 UT) TABS tablet Take 1 tablet by mouth daily    Automatic Reconciliation, Ar   cyanocobalamin 1000 MCG tablet Take 1 tablet by mouth daily    Automatic Reconciliation, Ar   fluticasone-salmeterol (ADVAIR DISKUS) 500-50 MCG/ACT AEPB diskus inhaler Inhale 1 puff into the lungs in the morning and 1 puff in the evening. 16   Automatic Reconciliation, Ar   montelukast (SINGULAIR) 10 MG tablet Take 1 tablet by mouth daily 16

## 2024-06-03 ENCOUNTER — OFFICE VISIT (OUTPATIENT)
Age: 44
End: 2024-06-03
Payer: COMMERCIAL

## 2024-06-03 VITALS
HEART RATE: 65 BPM | BODY MASS INDEX: 50.02 KG/M2 | HEIGHT: 64 IN | SYSTOLIC BLOOD PRESSURE: 135 MMHG | WEIGHT: 293 LBS | OXYGEN SATURATION: 98 % | TEMPERATURE: 98.4 F | RESPIRATION RATE: 20 BRPM | DIASTOLIC BLOOD PRESSURE: 74 MMHG

## 2024-06-03 DIAGNOSIS — K21.9 GASTROESOPHAGEAL REFLUX DISEASE WITHOUT ESOPHAGITIS: ICD-10-CM

## 2024-06-03 DIAGNOSIS — E66.01 MORBID OBESITY (HCC): Primary | ICD-10-CM

## 2024-06-03 DIAGNOSIS — K44.9 HIATAL HERNIA: ICD-10-CM

## 2024-06-03 PROCEDURE — 99213 OFFICE O/P EST LOW 20 MIN: CPT | Performed by: SURGERY

## 2024-06-03 ASSESSMENT — PATIENT HEALTH QUESTIONNAIRE - PHQ9
1. LITTLE INTEREST OR PLEASURE IN DOING THINGS: NOT AT ALL
SUM OF ALL RESPONSES TO PHQ9 QUESTIONS 1 & 2: 0
SUM OF ALL RESPONSES TO PHQ QUESTIONS 1-9: 0
2. FEELING DOWN, DEPRESSED OR HOPELESS: NOT AT ALL

## 2024-06-04 NOTE — PROGRESS NOTES
Identified patient with two patient identifiers (name and ). Reviewed chart in preparation for visit and have obtained necessary documentation.    Maggie Lin is a 43 y.o. female  Chief Complaint   Patient presents with    Weight Management     /74 (Site: Right Lower Arm, Position: Sitting, Cuff Size: Large Adult)   Pulse 65   Temp 98.4 °F (36.9 °C)   Resp 20   Ht 1.626 m (5' 4\")   Wt 134.9 kg (297 lb 8 oz)   SpO2 98%   BMI 51.07 kg/m²     1. Have you been to the ER, urgent care clinic since your last visit?  Hospitalized since your last visit?no    2. Have you seen or consulted any other health care providers outside of the [facility] since your last visit?  Include any pap smears or colon screening. no  
  fluticasone-salmeterol (ADVAIR DISKUS) 500-50 MCG/ACT AEPB diskus inhaler Inhale 1 puff into the lungs in the morning and 1 puff in the evening. 5/11/16  Yes Automatic Reconciliation, Ar   montelukast (SINGULAIR) 10 MG tablet Take 1 tablet by mouth daily 5/11/16  Yes Automatic Reconciliation, Ar   butalbital-acetaminophen-caffeine (FIORICET, ESGIC) -40 MG per tablet Take 1 tablet by mouth every 4 hours as needed for Headaches  Patient not taking: Reported on 12/14/2023 9/4/23 10/4/23  Charles Christina, APRN - NP     No Known Allergies        Objective:     /74 (Site: Right Lower Arm, Position: Sitting, Cuff Size: Large Adult)   Pulse 65   Temp 98.4 °F (36.9 °C)   Resp 20   Ht 1.626 m (5' 4\")   Wt 134.9 kg (297 lb 8 oz)   SpO2 98%   BMI 51.07 kg/m²     Physical Exam:  GENERAL: morbidly obese.    Data Review: Upper GI series: Small hiatal hernia with significant spontaneous reflux.  Normal emptying of sleeve gastrectomy.    Assessment:     Persistent morbid obesity, worsening gastroesophageal reflux disease following sleeve gastrectomy; no significant weight loss through behavior modification; no significant improvement in reflux symptoms despite escalation of medical therapy.  We discussed post sleeve gastrectomy reflux, and that the most effective treatment involve revision to gastric bypass with hiatal hernia repair.  Given high BMI, need for significant behavior modification, she would need to complete all preoperative prerequisites required by insurance for new patients.  We discussed weight loss strategies, to include increasing the intensity of her workouts, eliminating juice from her diet, minimizing starches.  She agrees with this plan.    Plan:     Increase intensity, frequency of exercise regimen.  Eliminate fruit juice from diet; minimize starches and sugars.  Complete 5 remaining registered dietitian evaluations.  Patient will schedule psychology evaluation.  Intake labs

## 2024-06-06 LAB
25(OH)D3+25(OH)D2 SERPL-MCNC: 24.1 NG/ML (ref 30–100)
ALBUMIN SERPL-MCNC: 4.2 G/DL (ref 3.9–4.9)
ALBUMIN/GLOB SERPL: 1.2 {RATIO} (ref 1.2–2.2)
ALP SERPL-CCNC: 97 IU/L (ref 44–121)
ALT SERPL-CCNC: 16 IU/L (ref 0–32)
AST SERPL-CCNC: 17 IU/L (ref 0–40)
BASOPHILS # BLD AUTO: 0.1 X10E3/UL (ref 0–0.2)
BASOPHILS NFR BLD AUTO: 1 %
BILIRUB SERPL-MCNC: 0.3 MG/DL (ref 0–1.2)
BUN SERPL-MCNC: 9 MG/DL (ref 6–24)
BUN/CREAT SERPL: 15 (ref 9–23)
CALCIUM SERPL-MCNC: 10.2 MG/DL (ref 8.7–10.2)
CHLORIDE SERPL-SCNC: 104 MMOL/L (ref 96–106)
CO2 SERPL-SCNC: 21 MMOL/L (ref 20–29)
CREAT SERPL-MCNC: 0.62 MG/DL (ref 0.57–1)
EGFRCR SERPLBLD CKD-EPI 2021: 115 ML/MIN/1.73
EOSINOPHIL # BLD AUTO: 0.1 X10E3/UL (ref 0–0.4)
EOSINOPHIL NFR BLD AUTO: 1 %
ERYTHROCYTE [DISTWIDTH] IN BLOOD BY AUTOMATED COUNT: 16.8 % (ref 11.7–15.4)
FOLATE SERPL-MCNC: 8.1 NG/ML
GLOBULIN SER CALC-MCNC: 3.6 G/DL (ref 1.5–4.5)
GLUCOSE SERPL-MCNC: 74 MG/DL (ref 70–99)
HBA1C MFR BLD: 5.3 % (ref 4.8–5.6)
HCT VFR BLD AUTO: 34.4 % (ref 34–46.6)
HGB BLD-MCNC: 9.6 G/DL (ref 11.1–15.9)
IMM GRANULOCYTES # BLD AUTO: 0 X10E3/UL (ref 0–0.1)
IMM GRANULOCYTES NFR BLD AUTO: 0 %
IRON SATN MFR SERPL: 6 % (ref 15–55)
IRON SERPL-MCNC: 28 UG/DL (ref 27–159)
LYMPHOCYTES # BLD AUTO: 2.2 X10E3/UL (ref 0.7–3.1)
LYMPHOCYTES NFR BLD AUTO: 37 %
MCH RBC QN AUTO: 20.7 PG (ref 26.6–33)
MCHC RBC AUTO-ENTMCNC: 27.9 G/DL (ref 31.5–35.7)
MCV RBC AUTO: 74 FL (ref 79–97)
MONOCYTES # BLD AUTO: 0.5 X10E3/UL (ref 0.1–0.9)
MONOCYTES NFR BLD AUTO: 8 %
NEUTROPHILS # BLD AUTO: 3.2 X10E3/UL (ref 1.4–7)
NEUTROPHILS NFR BLD AUTO: 53 %
PLATELET # BLD AUTO: 268 X10E3/UL (ref 150–450)
POTASSIUM SERPL-SCNC: 4.7 MMOL/L (ref 3.5–5.2)
PROT SERPL-MCNC: 7.8 G/DL (ref 6–8.5)
RBC # BLD AUTO: 4.64 X10E6/UL (ref 3.77–5.28)
SODIUM SERPL-SCNC: 137 MMOL/L (ref 134–144)
TIBC SERPL-MCNC: 466 UG/DL (ref 250–450)
TSH SERPL DL<=0.005 MIU/L-ACNC: 1.44 UIU/ML (ref 0.45–4.5)
UIBC SERPL-MCNC: 438 UG/DL (ref 131–425)
UREA BREATH TEST QL: NEGATIVE
VIT B12 SERPL-MCNC: 857 PG/ML (ref 232–1245)
WBC # BLD AUTO: 6 X10E3/UL (ref 3.4–10.8)

## 2024-06-28 ENCOUNTER — OFFICE VISIT (OUTPATIENT)
Age: 44
End: 2024-06-28

## 2024-06-28 DIAGNOSIS — E66.01 MORBID OBESITY (HCC): Primary | ICD-10-CM

## 2024-06-28 NOTE — PROGRESS NOTES
[facility] at [facility]  Supervised Weight Loss     Date:   2024    Patient's Name: Maggie Lin  : 1980    Insurance:  [employer]            Session:   Surgery: Gastric Bypass  Surgeon:  Chidi Cruz M.D.     Height: 64\"  Recent Office Weight:    297      Lbs.   BMI: 51   Pounds Lost since last month: 0               Pounds Gained since last month: 2#    Starting Weight: 295#   Previous Month’s Weight: 295#  Overall Pounds Lost: 0  Overall Pounds Gained: 2#    Other Pertinent Information: Today's appointment was completed over the phone. Pt with previous sleeve gastrectomy seeking conversion to gastric bypass.       I have reviewed with pt the guidelines of the supervised wt loss program.  Pt understands the expectations of some wt loss during the program and that wt gain could delay the process. I have also explained that classes need to be consecutive.  Missing a class may result in starting over. Pt has received this information in writing.          Changes that patient has made since last month include:  eating more vegetables.      Eating Habits and Behaviors  General healthy eating guidelines were discussed. A nutrition lesson specific to the importance of protein intake after surgery was provided. We discussed food sources of protein, protein supplements and multiple reasons as to why protein is important after bariatric surgery.  Pts were instructed to focus on including protein at every meal and practice eating protein first at the meal. Pts were encouraged to sample a protein shake for tolerance. Patients were also instructed to use the balanced plate method for help with portion control and general healthy eating prior to surgery. We discussed measuring meals to 1/2 cup total per meal after surgery. Drinking only calorie-free, sugar-free and non-carbonated beverages. We discussed the importance of drinking 64 ounces of fluid per day to prevent

## 2024-07-26 ENCOUNTER — TRANSCRIBE ORDERS (OUTPATIENT)
Dept: SCHEDULING | Age: 44
End: 2024-07-26

## 2024-07-26 ENCOUNTER — OFFICE VISIT (OUTPATIENT)
Age: 44
End: 2024-07-26

## 2024-07-26 DIAGNOSIS — Z12.31 SCREENING MAMMOGRAM FOR BREAST CANCER: Primary | ICD-10-CM

## 2024-07-26 DIAGNOSIS — E66.01 MORBID OBESITY (HCC): Primary | ICD-10-CM

## 2024-07-26 NOTE — PROGRESS NOTES
[facility] at [facility]  Supervised Weight Loss     Date:   2024    Patient's Name: Maggie Lin  : 1980    Insurance:  [employer]                             Session: 3 of  6  Surgery: Gastric Bypass                  Surgeon:  Chidi Cruz M.D.      Height: 64\"                 Recent Office Weight:    297      Lbs.                                  BMI: 51              Pounds Lost since last month: 0               Pounds Gained since last month: 0#     Starting Weight: 295#                       Previous Month’s Weight: 297#  Overall Pounds Lost: 0                    Overall Pounds Gained: 2#     Other Pertinent Information: Today's appointment was completed over the phone. Pt with previous sleeve gastrectomy seeking conversion to gastric bypass. Has not weighed this past month.     Smoking Status:  none  Alcohol Intake: none    I have reviewed with pt the guidelines of the supervised wt loss program.  Pt understands the expectations of some wt loss during the program and that wt gain could delay the process. I have also explained that appointments need to be consecutive and missing an appointment may result in starting over. Pt has received this information in writing.          Changes that patient has made since last month include:  eating healthier, limited walking d/t hot weather.      Eating Habits and Behaviors  A nutrition lesson specific to vitamins was provided. We discussed the various reasons for needing vitamins and different types and doses. General healthy eating guidelines were also discussed. Pts were instructed that their plate should be made up 1/2 plate coming from non-starchy vegetables, 1/4 coming from lean meat, and 1/4 of their plate coming from carbohydrates, including fruits, starches, or milk.  We discussed measuring meals to 1/2 cup total per meal after surgery. Drinking only calorie-free, sugar-free and non-carbonated beverages. We discussed

## 2024-08-07 ENCOUNTER — TELEPHONE (OUTPATIENT)
Age: 44
End: 2024-08-07

## 2024-08-12 ENCOUNTER — OFFICE VISIT (OUTPATIENT)
Age: 44
End: 2024-08-12
Payer: COMMERCIAL

## 2024-08-12 VITALS
HEART RATE: 64 BPM | SYSTOLIC BLOOD PRESSURE: 124 MMHG | HEIGHT: 64 IN | TEMPERATURE: 98 F | DIASTOLIC BLOOD PRESSURE: 78 MMHG | RESPIRATION RATE: 22 BRPM | BODY MASS INDEX: 50.02 KG/M2 | OXYGEN SATURATION: 100 % | WEIGHT: 293 LBS

## 2024-08-12 DIAGNOSIS — Z00.00 PHYSICAL EXAM: ICD-10-CM

## 2024-08-12 DIAGNOSIS — J45.20 MILD INTERMITTENT ASTHMA WITHOUT COMPLICATION: Primary | ICD-10-CM

## 2024-08-12 DIAGNOSIS — K44.9 HIATAL HERNIA: ICD-10-CM

## 2024-08-12 DIAGNOSIS — K21.9 GASTROESOPHAGEAL REFLUX DISEASE WITHOUT ESOPHAGITIS: ICD-10-CM

## 2024-08-12 DIAGNOSIS — E66.01 CLASS 3 SEVERE OBESITY DUE TO EXCESS CALORIES WITH SERIOUS COMORBIDITY AND BODY MASS INDEX (BMI) OF 45.0 TO 49.9 IN ADULT (HCC): ICD-10-CM

## 2024-08-12 DIAGNOSIS — D64.9 SEVERE ANEMIA: ICD-10-CM

## 2024-08-12 DIAGNOSIS — D50.9 IRON DEFICIENCY ANEMIA, UNSPECIFIED IRON DEFICIENCY ANEMIA TYPE: ICD-10-CM

## 2024-08-12 DIAGNOSIS — J43.9 PULMONARY EMPHYSEMA, UNSPECIFIED EMPHYSEMA TYPE (HCC): ICD-10-CM

## 2024-08-12 DIAGNOSIS — R07.9 CHEST PAIN, UNSPECIFIED TYPE: ICD-10-CM

## 2024-08-12 DIAGNOSIS — G47.33 OBSTRUCTIVE SLEEP APNEA: ICD-10-CM

## 2024-08-12 DIAGNOSIS — E78.2 MIXED HYPERLIPIDEMIA: ICD-10-CM

## 2024-08-12 DIAGNOSIS — R73.01 IFG (IMPAIRED FASTING GLUCOSE): ICD-10-CM

## 2024-08-12 DIAGNOSIS — I20.9 ANGINA PECTORIS (HCC): ICD-10-CM

## 2024-08-12 PROCEDURE — 99396 PREV VISIT EST AGE 40-64: CPT | Performed by: INTERNAL MEDICINE

## 2024-08-12 PROCEDURE — 93005 ELECTROCARDIOGRAM TRACING: CPT | Performed by: INTERNAL MEDICINE

## 2024-08-12 PROCEDURE — 99213 OFFICE O/P EST LOW 20 MIN: CPT | Performed by: INTERNAL MEDICINE

## 2024-08-12 PROCEDURE — 93010 ELECTROCARDIOGRAM REPORT: CPT | Performed by: INTERNAL MEDICINE

## 2024-08-12 RX ORDER — PANTOPRAZOLE SODIUM 40 MG/1
40 TABLET, DELAYED RELEASE ORAL
Qty: 90 TABLET | Refills: 0 | Status: SHIPPED | OUTPATIENT
Start: 2024-08-12

## 2024-08-12 ASSESSMENT — PATIENT HEALTH QUESTIONNAIRE - PHQ9
2. FEELING DOWN, DEPRESSED OR HOPELESS: NOT AT ALL
SUM OF ALL RESPONSES TO PHQ QUESTIONS 1-9: 0
1. LITTLE INTEREST OR PLEASURE IN DOING THINGS: NOT AT ALL
SUM OF ALL RESPONSES TO PHQ9 QUESTIONS 1 & 2: 0
SUM OF ALL RESPONSES TO PHQ QUESTIONS 1-9: 0

## 2024-08-12 ASSESSMENT — ENCOUNTER SYMPTOMS: SHORTNESS OF BREATH: 0

## 2024-08-12 NOTE — PROGRESS NOTES
HISTORY OF PRESENT ILLNESS  Maggie Lin is a 44 y.o. female.  HPI    This is an established visit completed with telemedicine was completed with video assist. The patient acknowledges and agrees to this method of visitation.    Last here vv 9/15/23. Pt is here for routine care.        Scheduled colonoscopy and endoscopy with Dr Ismael Marquez (GI) 10/19/23   Received 1 unit PRBCs and IV iron in the hospital now taking oral iron       No home BP readings for review  BP in hospital 150/64 will need a repeat blood pressure in clinic to see if she has hypertension or if this is a one-time event        Patient with significant obesity  History of past gastric bypass  Wt at hospital 281 lbs, up 4 lbs x lov   Discussed need for weight loss portion control BMI has been climbing significantly     Reviewed labs   Ordered labs     Lov reported lower abdominal pain worse than menstrual cramps and she states it feels like [place] contractions.     She was in the hospital 23-23 for severe anemia      She presented to ED 23 for L thumb laceration      She is following with Dr Vazquez (gyn) for enlarged fibroid   Lov 24  Reviewed note:  HPI Notes: *Pt wearing a mask. **Pt declines pelvic exam Rm 13: 44yo  patient presents for post-op f/u. Procedure: Hysteroscopy, dilatation & curettage, abdominal myomectomy performed on  by Dr. Arrieta with assist from Dr. Rodriguez for AUB-L and bulk (12cm, 6cm, 6cm leiomyoma, vertical midline) Path: Secretory endometrium. Negative for hyperplasia and carcinoma. Leiomyomata. Pt reports incisions healing well. C/o pelvic pain after walking for an extended period time. Denies abn vaginal discharge, abn vaginal bleeding/spotting, n/v, fever/chills, drainage from incisions, urinary/BM concerns.  She also had an endometrial biopsy on 23      She is following with Dr. Shaw (hepato) for hepatic mass which is likely focal nodular hyperplasia saw him in October and then 
HISTORY OF PRESENT ILLNESS  Maggie Lin is a 44 y.o. female.  Hyperlipidemia  Associated symptoms include chest pain. Pertinent negatives include no shortness of breath.   Chest Pain   Pertinent negatives include no shortness of breath.   Her past medical history is significant for hyperlipidemia.     Last here vv 9/15/23. Pt is here for routine care.    She notes sharp chest pain with exertion that alleviates with rest x 4-5 months.  States this happens with every exertion and resolves with rest will do EKG today.  Given exertional symptoms will need to get stress test asked patient also would like to proceed with bariatric surgery will need stress test prior to this        Needs to reschedule colonoscopy and endoscopy with Dr Ismael Marquez (GI), referred for f/u again today.  Recall these are both scheduled for 2023 after admission for severe anemia unfortunately she canceled both and never followed up she continues to have difficulty with severe reflux she had a barium swallow showing severe reflux Rx'd protonix for acid reflux  Recall during her admission 2023 she had a transfusion and was given an iron supplement  She does note that she has had fibroid surgery since then in 2023       BP today 124/78          Patient with significant obesity  History of past gastric bypass  Wt at hospital 300 lbs, up 19 lbs since lov  Discussed need for weight loss portion control BMI has been climbing significantly  She has been meeting with bariatric surgery has a history of sleeve gastrectomy     Reviewed labs   Ordered labs         She was in the hospital 23-23 for severe anemia            She is following with Dr Vazquez (gyn) for enlarged fibroid--had this removed 2023  Lov 24  Reviewed note:  HPI Notes: *Pt wearing a mask. **Pt declines pelvic exam Rm 13: 42yo  patient presents for post-op f/u. Procedure: Hysteroscopy, dilatation & curettage, abdominal myomectomy 
0.083% nebulizer solution 2.5 mg = 3 mL, Inhalation, every 6 hours, 3 Refills, Dispense: 1,080, mL, Pharmacy [id number] (Patient taking differently: Take 3 mLs by nebulization every 4 hours as needed for Shortness of Breath 2.5 mg = 3 mL, Inhalation, every 6 hours, 3 Refills, Dispense: 1,080, mL, Pharmacy [id number]) 120 each 2    ferrous sulfate (IRON 325) 325 (65 Fe) MG tablet Take 1 tablet by mouth every morning (before breakfast) 30 tablet 3    MAGNESIUM PO Take by mouth 3 times daily      albuterol sulfate HFA (PROVENTIL;VENTOLIN;PROAIR) 108 (90 Base) MCG/ACT inhaler Inhale 2 puffs into the lungs every 4 hours as needed for Shortness of Breath      vitamin D3 (CHOLECALCIFEROL) 125 MCG (5000 UT) TABS tablet Take 1 tablet by mouth daily      cyanocobalamin 1000 MCG tablet Take 1 tablet by mouth daily      fluticasone-salmeterol (ADVAIR DISKUS) 500-50 MCG/ACT AEPB diskus inhaler Inhale 1 puff into the lungs in the morning and 1 puff in the evening.      montelukast (SINGULAIR) 10 MG tablet Take 1 tablet by mouth daily       No current facility-administered medications for this visit.     Past Surgical History:   Procedure Laterality Date    ENDOSCOPY, COLON, DIAGNOSTIC      GASTRECTOMY  12/29/2016    lap sleeve gastrectomy by Dr Chidi Cruz    GYN  t-14    d and c post miscarriage    GYN  2008    c section    GYN  12/5/12    HYSTEROSCOPIC REMOVAL OF IUD    HYSTEROSCOPY N/A 12/21/2023    Hysteroscopy D&C performed by Bhavna Arrieta MD at [facility] MAIN OR    KNEE ARTHROSCOPY      L KNEE    MYOMECTOMY N/A 12/21/2023    ABDOMINAL MYOMECTOMY, performed by Bhavna Arrieta MD at [facility] MAIN OR    ORTHOPEDIC SURGERY      right tendon repair at age 10-12    ORTHOPEDIC SURGERY      right hand         Lab Results   Component Value Date    WBC 6.0 06/03/2024    HGB 9.6 (L) 06/03/2024    HCT 34.4 06/03/2024    MCV 74 (L) 06/03/2024     06/03/2024     Lab Results   Component Value Date

## 2024-08-14 ENCOUNTER — HOSPITAL ENCOUNTER (OUTPATIENT)
Facility: HOSPITAL | Age: 44
Discharge: HOME | End: 2024-08-17
Attending: INTERNAL MEDICINE
Payer: COMMERCIAL

## 2024-08-14 VITALS — BODY MASS INDEX: 50.02 KG/M2 | HEIGHT: 64 IN | WEIGHT: 293 LBS

## 2024-08-14 DIAGNOSIS — Z12.31 SCREENING MAMMOGRAM FOR BREAST CANCER: ICD-10-CM

## 2024-08-14 PROCEDURE — 77063 BREAST TOMOSYNTHESIS BI: CPT

## 2024-08-14 ASSESSMENT — SLEEP AND FATIGUE QUESTIONNAIRES
DO YOU WORK SHIFTS: NO
DO YOU HAVE DIFFICULTY CONCENTRATING ON THE THINGS YOU DO BECAUSE YOU ARE SLEEPY OR TIRED: YES, MODERATE
WHAT TIME DO YOU USUALLY GO TO BED: 22:45
NUMBER OF TIMES YOU WAKE PER NIGHT: 2
HOW LIKELY ARE YOU TO NOD OFF OR FALL ASLEEP WHEN YOU ARE A PASSENGER IN A CAR FOR AN HOUR WITHOUT A BREAK: SLIGHT CHANCE OF DOZING
HAS YOUR RELATIONSHIP WITH FAMILY, FRIENDS OR WORK COLLEAGUES BEEN AFFECTED BECAUSE YOU ARE SLEEPY OR TIRED: NO
DO YOU HAVE DIFFICULTY OPERATING A MOTOR VEHICLE FOR SHORT DISTANCES (LESS THAN 100 MILES) BECAUSE YOU BECOME SLEEPY: NO
HOW LIKELY ARE YOU TO NOD OFF OR FALL ASLEEP WHILE SITTING AND TALKING TO SOMEONE: WOULD NEVER DOZE
SELECT ANY OF THE FOLLOWING BEHAVIORS OBSERVED WHILE YOU ARE ASLEEP: PAUSES IN BREATHING
HOW LIKELY ARE YOU TO NOD OFF OR FALL ASLEEP WHILE SITTING QUIETLY AFTER LUNCH WITHOUT ALCOHOL: WOULD NEVER DOZE
HOW LIKELY ARE YOU TO NOD OFF OR FALL ASLEEP WHILE SITTING AND READING: WOULD NEVER DOZE
HOW LONG DO YOU NAP: OTHER
DO YOU GENERALLY HAVE DIFFICULTY REMEMBERING THINGS BECAUSE YOU ARE SLEEPY OR TIRED: YES, MODERATE
HOW LIKELY ARE YOU TO NOD OFF OR FALL ASLEEP WHILE WATCHING TV: WOULD NEVER DOZE
HOW LIKELY ARE YOU TO NOD OFF OR FALL ASLEEP WHILE SITTING AND TALKING TO SOMEONE: WOULD NEVER DOZE
HOW LIKELY ARE YOU TO NOD OFF OR FALL ASLEEP IN A CAR, WHILE STOPPED FOR A FEW MINUTES IN TRAFFIC: WOULD NEVER DOZE
HOW LIKELY ARE YOU TO NOD OFF OR FALL ASLEEP WHILE SITTING AND READING: WOULD NEVER DOZE
HOW LIKELY ARE YOU TO NOD OFF OR FALL ASLEEP WHILE SITTING INACTIVE IN A PUBLIC PLACE: WOULD NEVER DOZE
FOSQ SCORE: 15.5
HOW LIKELY ARE YOU TO NOD OFF OR FALL ASLEEP WHEN YOU ARE A PASSENGER IN A CAR FOR AN HOUR WITHOUT A BREAK: SLIGHT CHANCE OF DOZING
DO YOU GET TOO LITTLE SLEEP AT NIGHT: YES
HAS YOUR MOOD BEEN AFFECTED BECAUSE YOU ARE SLEEPY OR TIRED: NO
AVERAGE NUMBER OF SLEEP HOURS: 6
DO YOU HAVE DIFFICULTY BEING AS ACTIVE AS YOU WANT TO BE IN THE EVENING BECAUSE YOU ARE SLEEPY OR TIRED: YES, EXTREME
DO YOU HAVE PROBLEMS WITH FREQUENT AWAKENINGS AT NIGHT: YES
HOW LIKELY ARE YOU TO NOD OFF OR FALL ASLEEP IN A CAR, WHILE STOPPED FOR A FEW MINUTES IN TRAFFIC: WOULD NEVER DOZE
ARE YOU BOTHERED BY WAKING UP TOO EARLY AND NOT BEING ABLE TO GET BACK TO SLEEP: YES
DO YOU HAVE DIFFICULTY OPERATING A MOTOR VEHICLE FOR LONG DISTANCES (GREATER THAN 100 MILES) BECAUSE YOU BECOME SLEEPY: NO
DO YOU HAVE DIFFICULTY BEING AS ACTIVE AS YOU WANT TO BE IN THE MORNING BECAUSE YOU ARE SLEEPY OR TIRED: YES, LITTLE
DO YOU HAVE DIFFICULTY VISITING YOUR FAMILY OR FRIENDS IN THEIR HOME BECAUSE YOU BECOME SLEEPY OR TIRED: NO
HOW LIKELY ARE YOU TO NOD OFF OR FALL ASLEEP WHILE SITTING INACTIVE IN A PUBLIC PLACE: WOULD NEVER DOZE
DO YOU HAVE DIFFICULTY WATCHING A MOVIE OR VIDEO BECAUSE YOU BECOME SLEEPY OR TIRED: YES, A LITTLE
DO YOU TAKE NAPS: YES
SELECT ANY OF THE FOLLOWING BEHAVIORS OBSERVED WHILE PATIENT ASLEEP: LIGHT SNORING;PAUSES IN BREATHING
DO YOU GET TOO LITTLE SLEEP AT NIGHT: YES
SELECT ANY OF THE FOLLOWING BEHAVIORS OBSERVED WHILE YOU ARE ASLEEP: LIGHT SNORING
HOW MANY NAPS DO YOU TAKE PER WEEK: 4
ARE YOU BOTHERED BY WAKING UP TOO EARLY AND NOT BEING ABLE TO GET BACK TO SLEEP: YES
HOW LIKELY ARE YOU TO NOD OFF OR FALL ASLEEP WHILE LYING DOWN TO REST IN THE AFTERNOON WHEN CIRCUMSTANCES PERMIT: MODERATE CHANCE OF DOZING
AVERAGE NUMBER OF SLEEP HOURS: 6
ESS TOTAL SCORE: 3
HOW LIKELY ARE YOU TO NOD OFF OR FALL ASLEEP WHILE WATCHING TV: WOULD NEVER DOZE
HOW LIKELY ARE YOU TO NOD OFF OR FALL ASLEEP WHILE SITTING QUIETLY AFTER LUNCH WITHOUT ALCOHOL: WOULD NEVER DOZE
HOW LIKELY ARE YOU TO NOD OFF OR FALL ASLEEP WHILE LYING DOWN TO REST IN THE AFTERNOON WHEN CIRCUMSTANCES PERMIT: MODERATE CHANCE OF DOZING

## 2024-08-15 ENCOUNTER — OFFICE VISIT (OUTPATIENT)
Age: 44
End: 2024-08-15
Payer: COMMERCIAL

## 2024-08-15 VITALS
WEIGHT: 293 LBS | TEMPERATURE: 97.9 F | HEART RATE: 65 BPM | BODY MASS INDEX: 50.02 KG/M2 | DIASTOLIC BLOOD PRESSURE: 76 MMHG | HEIGHT: 64 IN | OXYGEN SATURATION: 100 % | SYSTOLIC BLOOD PRESSURE: 129 MMHG

## 2024-08-15 DIAGNOSIS — G47.00 INSOMNIA WITH SLEEP APNEA: Primary | ICD-10-CM

## 2024-08-15 DIAGNOSIS — G47.30 INSOMNIA WITH SLEEP APNEA: Primary | ICD-10-CM

## 2024-08-15 PROCEDURE — 99203 OFFICE O/P NEW LOW 30 MIN: CPT | Performed by: INTERNAL MEDICINE

## 2024-08-15 RX ORDER — TRANEXAMIC ACID 650 MG/1
TABLET ORAL
COMMUNITY
Start: 2023-09-25

## 2024-08-15 NOTE — PATIENT INSTRUCTIONS
[address]  [place] [address]  Tel.  [phone]  Fax. [phone] [address]  [place] [address]  Tel.  [phone]  Fax. [phone] [address] [place] [address]  Tel.  [phone]  Fax. [phone]     Sleep Apnea: After Your Visit  Your Care Instructions  Sleep apnea occurs when you frequently stop breathing for 10 seconds or longer during sleep. It can be mild to severe, based on the number of times per hour that you stop breathing or have slowed breathing. Blocked or narrowed airways in your nose, mouth, or throat can cause sleep apnea. Your airway can become blocked when your throat muscles and tongue relax during sleep.  Sleep apnea is common, occurring in 1 out of 20 individuals.  Individuals having any of the following characteristics should be evaluated and treated right away due to high risk and detrimental consequences from untreated sleep apnea:  Obesity  Congestive Heart failure  Atrial Fibrillation  Uncontrolled Hypertension  Type II Diabetes  Night-time Arrhythmias  Stroke  Pulmonary Hypertension  High-risk Driving Populations (pilots, truck drivers, etc.)  Patients Considering Weight-loss Surgery    How do you know you have sleep apnea?  You probably have sleep apnea if you answer 'yes' to 3 or more of the following questions:  S - Have you been told that you Snore?   T - Are you often Tired during the day?  O - Has anyone Observed you stop breathing while sleeping?  P- Do you have (or are being treated for) high blood Pressure?    B - Are you obese (Body Mass Index > 35)?  A - Is your Age 50 years old or older?  N - Is your Neck size greater than 16 inches?  G - Are you male Gender?  A sleep physician can prescribe a breathing device that prevents tissues in the throat from blocking your airway. Or your doctor may recommend using a dental device (oral breathing device) to help keep your airway open. In some cases, surgery may

## 2024-08-15 NOTE — PROGRESS NOTES
[address] [address]  Tel.  [phone]    Fax. [phone]     [address],   [place] [address]  Tel.  [phone]    Fax. [phone] [address] [place] [address]  Tel.  [phone]    Fax. [phone]       Maggie Lin is a 44 y.o. year old female referred by Dr. Patito Whitehead for evaluation of a sleep disorder.       ASSESSMENT/PLAN:     Diagnosis Orders   1. Insomnia with sleep apnea  PAT - Home Sleep Test      2. BMI 50.0-59.9, adult (HCC)            Patient has a history and examination consistent with the diagnosis of sleep apnea.    Return for follow-up after testing is completed.    * The patient currently has a Low Risk for having sleep apnea.  STOP-BANG score 3.    * Sleep testing was ordered for initial evaluation.      Orders Placed This Encounter   Procedures    PAT - Home Sleep Test     Standing Status:   Future     Standing Expiration Date:   2/15/2025     Order Specific Question:   Location For Sleep Study     Answer:   [facility]       * She was provided information on sleep apnea including corresponding risk factors and the importance of proper treatment.     * Treatment options were reviewed in detail. she would like to proceed with PAP therapy. Patient will be seen in follow-up in 6-8 weeks after PAP setup to gauge treatment response and adherence to therapy.     * The patient was counseled regarding proper sleep hygiene, with emphasis on ensuring sufficient total sleep time; safe driving and the benefits of exercise and weight loss.      * All of her questions were addressed.    2. Recommended a dedicated weight loss program through appropriate diet and exercise regimen as significant weight reduction has been shown to reduce severity of obstructive sleep apnea.     SUBJECTIVE/OBJECTIVE:    Maggie Lin is an 44 y.o. female referred for

## 2024-08-20 ENCOUNTER — HOSPITAL ENCOUNTER (OUTPATIENT)
Facility: HOSPITAL | Age: 44
Discharge: HOME | End: 2024-08-22
Attending: INTERNAL MEDICINE
Payer: COMMERCIAL

## 2024-08-20 ENCOUNTER — HOSPITAL ENCOUNTER (OUTPATIENT)
Facility: HOSPITAL | Age: 44
Discharge: HOME | End: 2024-08-23
Attending: INTERNAL MEDICINE
Payer: COMMERCIAL

## 2024-08-20 VITALS
DIASTOLIC BLOOD PRESSURE: 85 MMHG | WEIGHT: 293 LBS | HEART RATE: 76 BPM | HEIGHT: 64 IN | SYSTOLIC BLOOD PRESSURE: 155 MMHG | BODY MASS INDEX: 50.02 KG/M2

## 2024-08-20 DIAGNOSIS — R07.9 CHEST PAIN, UNSPECIFIED TYPE: ICD-10-CM

## 2024-08-20 DIAGNOSIS — I20.9 ANGINA PECTORIS (HCC): ICD-10-CM

## 2024-08-20 PROCEDURE — 78452 HT MUSCLE IMAGE SPECT MULT: CPT

## 2024-08-20 PROCEDURE — A9500 TC99M SESTAMIBI: HCPCS | Performed by: INTERNAL MEDICINE

## 2024-08-20 PROCEDURE — 2580000003 HC RX 258: Performed by: SPECIALIST

## 2024-08-20 PROCEDURE — 6360000002 HC RX W HCPCS: Performed by: SPECIALIST

## 2024-08-20 PROCEDURE — 3430000000 HC RX DIAGNOSTIC RADIOPHARMACEUTICAL: Performed by: INTERNAL MEDICINE

## 2024-08-20 PROCEDURE — 93017 CV STRESS TEST TRACING ONLY: CPT

## 2024-08-20 RX ORDER — SODIUM CHLORIDE 0.9 % (FLUSH) 0.9 %
10 SYRINGE (ML) INJECTION 2 TIMES DAILY
Status: DISCONTINUED | OUTPATIENT
Start: 2024-08-20 | End: 2024-08-23 | Stop reason: HOSPADM

## 2024-08-20 RX ORDER — CAFFEINE CITRATE 20 MG/ML
60 SOLUTION INTRAVENOUS ONCE
Status: COMPLETED | OUTPATIENT
Start: 2024-08-20 | End: 2024-08-20

## 2024-08-20 RX ORDER — TETRAKIS(2-METHOXYISOBUTYLISOCYANIDE)COPPER(I) TETRAFLUOROBORATE 1 MG/ML
33 INJECTION, POWDER, LYOPHILIZED, FOR SOLUTION INTRAVENOUS
Status: COMPLETED | OUTPATIENT
Start: 2024-08-20 | End: 2024-08-20

## 2024-08-20 RX ORDER — REGADENOSON 0.08 MG/ML
0.4 INJECTION, SOLUTION INTRAVENOUS
Status: COMPLETED | OUTPATIENT
Start: 2024-08-20 | End: 2024-08-20

## 2024-08-20 RX ADMIN — CAFFEINE CITRATE 60 MG: 20 INJECTION INTRAVENOUS at 09:34

## 2024-08-20 RX ADMIN — REGADENOSON 0.4 MG: 0.08 INJECTION, SOLUTION INTRAVENOUS at 09:32

## 2024-08-20 RX ADMIN — SODIUM CHLORIDE, PRESERVATIVE FREE 10 ML: 5 INJECTION INTRAVENOUS at 09:34

## 2024-08-20 RX ADMIN — TETRAKIS(2-METHOXYISOBUTYLISOCYANIDE)COPPER(I) TETRAFLUOROBORATE 33 MILLICURIE: 1 INJECTION, POWDER, LYOPHILIZED, FOR SOLUTION INTRAVENOUS at 09:20

## 2024-08-21 ENCOUNTER — HOSPITAL ENCOUNTER (OUTPATIENT)
Facility: HOSPITAL | Age: 44
Discharge: HOME | End: 2024-08-24
Attending: INTERNAL MEDICINE
Payer: COMMERCIAL

## 2024-08-21 LAB
ECHO BSA: 2.48 M2
EKG DIAGNOSIS: NORMAL
STRESS BASELINE DIAS BP: 85 MMHG
STRESS BASELINE HR: 74 BPM
STRESS BASELINE ST DEPRESSION: 0 MM
STRESS BASELINE SYS BP: 155 MMHG
STRESS ESTIMATED WORKLOAD: 1 METS
STRESS PEAK DIAS BP: 85 MMHG
STRESS PEAK SYS BP: 155 MMHG
STRESS PERCENT HR ACHIEVED: 68 %
STRESS POST PEAK HR: 121 BPM
STRESS RATE PRESSURE PRODUCT: NORMAL BPM*MMHG
STRESS ST DEPRESSION: 0 MM
STRESS STAGE 1 BP: NORMAL MMHG
STRESS STAGE 1 DURATION: 1 MIN:SEC
STRESS STAGE 1 HR: 121 BPM
STRESS STAGE 2 DURATION: 1 MIN:SEC
STRESS STAGE 2 HR: 108 BPM
STRESS STAGE 3 BP: NORMAL MMHG
STRESS STAGE 3 DURATION: 1 MIN:SEC
STRESS STAGE 3 HR: 100 BPM
STRESS STAGE RECOVERY 1 BP: NORMAL MMHG
STRESS STAGE RECOVERY 1 DURATION: 1 MIN:SEC
STRESS STAGE RECOVERY 1 HR: 93 BPM
STRESS STAGE RECOVERY 2 DURATION: 1 MIN:SEC
STRESS STAGE RECOVERY 2 HR: 89 BPM
STRESS STAGE RECOVERY 3 BP: NORMAL MMHG
STRESS STAGE RECOVERY 3 DURATION: 1 MIN:SEC
STRESS STAGE RECOVERY 3 HR: 88 BPM
STRESS TARGET HR: 178 BPM

## 2024-08-21 PROCEDURE — A9500 TC99M SESTAMIBI: HCPCS | Performed by: INTERNAL MEDICINE

## 2024-08-21 PROCEDURE — 3430000000 HC RX DIAGNOSTIC RADIOPHARMACEUTICAL: Performed by: INTERNAL MEDICINE

## 2024-08-21 PROCEDURE — 93016 CV STRESS TEST SUPVJ ONLY: CPT | Performed by: SPECIALIST

## 2024-08-21 PROCEDURE — 93018 CV STRESS TEST I&R ONLY: CPT | Performed by: SPECIALIST

## 2024-08-21 RX ORDER — TETRAKIS(2-METHOXYISOBUTYLISOCYANIDE)COPPER(I) TETRAFLUOROBORATE 1 MG/ML
32.7 INJECTION, POWDER, LYOPHILIZED, FOR SOLUTION INTRAVENOUS
Status: COMPLETED | OUTPATIENT
Start: 2024-08-21 | End: 2024-08-21

## 2024-08-21 RX ADMIN — TETRAKIS(2-METHOXYISOBUTYLISOCYANIDE)COPPER(I) TETRAFLUOROBORATE 32.7 MILLICURIE: 1 INJECTION, POWDER, LYOPHILIZED, FOR SOLUTION INTRAVENOUS at 07:50

## 2024-08-22 ENCOUNTER — HOSPITAL ENCOUNTER (OUTPATIENT)
Facility: HOSPITAL | Age: 44
Discharge: HOME | End: 2024-08-25
Payer: COMMERCIAL

## 2024-08-22 DIAGNOSIS — G47.30 INSOMNIA WITH SLEEP APNEA: ICD-10-CM

## 2024-08-22 DIAGNOSIS — G47.00 INSOMNIA WITH SLEEP APNEA: ICD-10-CM

## 2024-08-22 PROCEDURE — 95800 SLP STDY UNATTENDED: CPT | Performed by: INTERNAL MEDICINE

## 2024-08-22 NOTE — RESULT ENCOUNTER NOTE
Called, Spoke with Pt  Received two pt identifiers  Pt informed per Dr. Whitehead stress test normal  Form placed in Dr. Whitehead's office to sign  Pt verbalizes understanding of the instructions and has no further questions at this time.

## 2024-08-23 ENCOUNTER — OFFICE VISIT (OUTPATIENT)
Age: 44
End: 2024-08-23

## 2024-08-23 DIAGNOSIS — E66.01 MORBID OBESITY (HCC): Primary | ICD-10-CM

## 2024-08-23 NOTE — PROGRESS NOTES
shakes per day and 1 meal is salad with protein. Drinks coffee and water.                  Physical Activity/Exercise  We talked about the importance of increasing daily physical activity and beginning to develop an exercise regimen/routine. We talked about exercise as being an important part of long term weight loss after surgery.     Comments:  During class, I discussed with patient the importance of getting into an exercise routine.  Pt is currently exercising at the [facility] (elliptical for 10 minutes, swimming/water aerobics, resistance training, yoga class) and walking an hour daily for activity.  Pt has been encouraged to maintain and increase as tolerated.     Behavior Modification       A behavior modification lesson was provided with an emphasis on developing mindful eating behaviors. We talked about how to eat more mindfully and identify emotional eating triggers. Tips and recommendations for how to make these changes were provided. Pt was encouraged to keep a food journal and record what they were taking in daily.     Patient's reported eating behaviors: pt does not drink with meals and sips fluids and does not gulp        Overall Assessment: Pt demonstrates appropriate lifestyle changes evidenced by reported changes. Is unsure of reason for wt gain. Will continue to assess.     Patient-Set Goals:   1. Nutrition - maintain protein focused meals and snack choices  2. Exercise - maintain exercise and increase as tolerated   3. Behavior -continue to implement 30/30 drinking rule    Janis Carson, AUSTEN  8/23/2024

## 2024-08-30 ENCOUNTER — CLINICAL DOCUMENTATION (OUTPATIENT)
Age: 44
End: 2024-08-30

## 2024-08-30 DIAGNOSIS — G47.30 INSOMNIA WITH SLEEP APNEA: Primary | ICD-10-CM

## 2024-08-30 DIAGNOSIS — G47.00 INSOMNIA WITH SLEEP APNEA: Primary | ICD-10-CM

## 2024-08-30 NOTE — PROGRESS NOTES
Maggie Lin is to be contacted by sleep technologists regarding results of WatchPAT Testing which was indicative of an average pAHI 3% of 12.0 and pAHI 4% of 4.3 per hour.  SpO2 nathan was 90% and SpO2 of < 88% was 0.00 minutes.        An APAP prescription has been written and patient will be contacted by office staff regarding follow-up  in 2-3 months after initiation of therapy.    Encounter Diagnosis   Name Primary?    Insomnia with sleep apnea Yes       Orders Placed This Encounter   Procedures    DME Order for (Specify) as OP     - DME device ordered - ResMed Device with Heated Humidifer  / .   - Diagnosis: Obstructive Sleep Apnea (G47.33)  - Length of Need: Lifetime    Pressure Settin - 15 cmH2O     Nasal Cushion (Replace) 2 per month.     Nasal Interface Mask 1 every 3 months.    Headgear 1 every 6 months.     Filter(s) Disposable 2 per month.   Filter(s) Non-Disposable 1 every 6 months.      Water Chamber for Humidifier (Replace) 1 every 6 months.   Tubing with heating element 1 every 3 months.    Perform Mask Fitting per patient preference and comfort - replace as above.      Kandice Juarez MD, [facility]; NPI: [id number]  Electronically signed. 2024

## 2024-09-03 ENCOUNTER — TELEPHONE (OUTPATIENT)
Facility: HOSPITAL | Age: 44
End: 2024-09-03

## 2024-09-04 ENCOUNTER — TELEPHONE (OUTPATIENT)
Age: 44
End: 2024-09-04

## 2024-09-04 NOTE — TELEPHONE ENCOUNTER
Identified patient with two patient identifiers (name and ). Reviewed chart in preparation for encounter and have obtained necessary documentation.      Called and spoke with patient to inform her we still have not received her PCP support form, patient given fax and stated she will have this sent over. Patient understood what was discussed and thankful for the follow up call.

## 2024-09-04 NOTE — TELEPHONE ENCOUNTER
Called, Spoke with Pt  Received two pt identifiers  Advised pt form faxed with confirmation  Pt verbalizes understanding of the instructions and has no further questions at this time.

## 2024-09-04 NOTE — TELEPHONE ENCOUNTER
PT called the office stating that the office Dr. Cruz's office have not received a letter of support for the surgery. Pt is still needing the letter sent and it has been 2 weeks. Please fax letter to [phone]    Please reach out to pt once it has been completed.

## 2024-09-05 ENCOUNTER — CLINICAL DOCUMENTATION (OUTPATIENT)
Age: 44
End: 2024-09-05

## 2024-09-05 NOTE — PROGRESS NOTES
Called and spoke to patient to discuss PAP device order and DME company options. PAP device order faxed to [facility] per discussion with patient. Patient instructed on next steps and scheduled for a first adherence/compliance appointment. Patient instructed to bring device, supplies, and power cord to appointment. Patient instructed to contact [facility] if issues with PAP therapy or device arise. Definition of compliance with PAP therapy conveyed to patient.

## 2024-09-05 NOTE — TELEPHONE ENCOUNTER
Called and spoke to patient to discuss PAP device order and DME company options. PAP device order faxed to [employer] per discussion with patient. Patient instructed on next steps and scheduled for a first adherence/compliance appointment. Patient instructed to bring device, supplies, and power cord to appointment. Patient instructed to contact [facility] if issues with PAP therapy or device arise. Definition of compliance with PAP therapy conveyed to patient.

## 2024-09-17 ENCOUNTER — OFFICE VISIT (OUTPATIENT)
Age: 44
End: 2024-09-17

## 2024-09-17 DIAGNOSIS — E66.01 MORBID OBESITY: Primary | ICD-10-CM

## 2024-10-01 ENCOUNTER — ANESTHESIA (OUTPATIENT)
Facility: HOSPITAL | Age: 44
End: 2024-10-01
Payer: COMMERCIAL

## 2024-10-01 ENCOUNTER — ANESTHESIA EVENT (OUTPATIENT)
Facility: HOSPITAL | Age: 44
End: 2024-10-01
Payer: COMMERCIAL

## 2024-10-01 ENCOUNTER — HOSPITAL ENCOUNTER (OUTPATIENT)
Facility: HOSPITAL | Age: 44
Setting detail: OUTPATIENT SURGERY
Discharge: HOME | End: 2024-10-01
Attending: INTERNAL MEDICINE | Admitting: INTERNAL MEDICINE
Payer: COMMERCIAL

## 2024-10-01 VITALS
OXYGEN SATURATION: 99 % | HEART RATE: 67 BPM | TEMPERATURE: 97 F | SYSTOLIC BLOOD PRESSURE: 129 MMHG | DIASTOLIC BLOOD PRESSURE: 78 MMHG | RESPIRATION RATE: 23 BRPM | BODY MASS INDEX: 50.02 KG/M2 | WEIGHT: 293 LBS | HEIGHT: 64 IN

## 2024-10-01 LAB — HCG UR QL: NEGATIVE

## 2024-10-01 PROCEDURE — 3600007512: Performed by: INTERNAL MEDICINE

## 2024-10-01 PROCEDURE — 2709999900 HC NON-CHARGEABLE SUPPLY: Performed by: INTERNAL MEDICINE

## 2024-10-01 PROCEDURE — 7100000010 HC PHASE II RECOVERY - FIRST 15 MIN: Performed by: INTERNAL MEDICINE

## 2024-10-01 PROCEDURE — 2720000010 HC SURG SUPPLY STERILE: Performed by: INTERNAL MEDICINE

## 2024-10-01 PROCEDURE — 3700000001 HC ADD 15 MINUTES (ANESTHESIA): Performed by: INTERNAL MEDICINE

## 2024-10-01 PROCEDURE — 2580000003 HC RX 258: Performed by: NURSE PRACTITIONER

## 2024-10-01 PROCEDURE — 6360000002 HC RX W HCPCS: Performed by: NURSE PRACTITIONER

## 2024-10-01 PROCEDURE — 3700000000 HC ANESTHESIA ATTENDED CARE: Performed by: INTERNAL MEDICINE

## 2024-10-01 PROCEDURE — 81025 URINE PREGNANCY TEST: CPT

## 2024-10-01 PROCEDURE — 7100000011 HC PHASE II RECOVERY - ADDTL 15 MIN: Performed by: INTERNAL MEDICINE

## 2024-10-01 PROCEDURE — 88305 TISSUE EXAM BY PATHOLOGIST: CPT

## 2024-10-01 PROCEDURE — 3600007502: Performed by: INTERNAL MEDICINE

## 2024-10-01 RX ORDER — SODIUM CHLORIDE 9 MG/ML
INJECTION, SOLUTION INTRAVENOUS CONTINUOUS
Status: CANCELLED | OUTPATIENT
Start: 2024-10-01

## 2024-10-01 RX ORDER — SODIUM CHLORIDE 0.9 % (FLUSH) 0.9 %
5-40 SYRINGE (ML) INJECTION PRN
Status: CANCELLED | OUTPATIENT
Start: 2024-10-01

## 2024-10-01 RX ORDER — SODIUM CHLORIDE 9 MG/ML
25 INJECTION, SOLUTION INTRAVENOUS PRN
Status: CANCELLED | OUTPATIENT
Start: 2024-10-01

## 2024-10-01 RX ORDER — SODIUM CHLORIDE 9 MG/ML
INJECTION, SOLUTION INTRAVENOUS CONTINUOUS
Status: DISCONTINUED | OUTPATIENT
Start: 2024-10-01 | End: 2024-10-01 | Stop reason: HOSPADM

## 2024-10-01 RX ORDER — SODIUM CHLORIDE 0.9 % (FLUSH) 0.9 %
5-40 SYRINGE (ML) INJECTION EVERY 12 HOURS SCHEDULED
Status: CANCELLED | OUTPATIENT
Start: 2024-10-01

## 2024-10-01 RX ORDER — SODIUM CHLORIDE 0.9 % (FLUSH) 0.9 %
5-40 SYRINGE (ML) INJECTION PRN
Status: DISCONTINUED | OUTPATIENT
Start: 2024-10-01 | End: 2024-10-01 | Stop reason: HOSPADM

## 2024-10-01 RX ORDER — SODIUM CHLORIDE 0.9 % (FLUSH) 0.9 %
5-40 SYRINGE (ML) INJECTION EVERY 12 HOURS SCHEDULED
Status: DISCONTINUED | OUTPATIENT
Start: 2024-10-01 | End: 2024-10-01 | Stop reason: HOSPADM

## 2024-10-01 RX ORDER — M-VIT,TX,IRON,MINS/CALC/FOLIC 27MG-0.4MG
1 TABLET ORAL DAILY
COMMUNITY

## 2024-10-01 RX ORDER — SODIUM CHLORIDE 9 MG/ML
INJECTION, SOLUTION INTRAVENOUS
Status: DISCONTINUED | OUTPATIENT
Start: 2024-10-01 | End: 2024-10-01 | Stop reason: SDUPTHER

## 2024-10-01 RX ORDER — SODIUM CHLORIDE 9 MG/ML
25 INJECTION, SOLUTION INTRAVENOUS PRN
Status: DISCONTINUED | OUTPATIENT
Start: 2024-10-01 | End: 2024-10-01 | Stop reason: HOSPADM

## 2024-10-01 RX ADMIN — PROPOFOL 50 MG: 10 INJECTION, EMULSION INTRAVENOUS at 13:57

## 2024-10-01 RX ADMIN — PROPOFOL 50 MG: 10 INJECTION, EMULSION INTRAVENOUS at 13:51

## 2024-10-01 RX ADMIN — PROPOFOL 50 MG: 10 INJECTION, EMULSION INTRAVENOUS at 13:47

## 2024-10-01 RX ADMIN — PROPOFOL 50 MG: 10 INJECTION, EMULSION INTRAVENOUS at 13:53

## 2024-10-01 RX ADMIN — PROPOFOL 50 MG: 10 INJECTION, EMULSION INTRAVENOUS at 14:03

## 2024-10-01 RX ADMIN — SODIUM CHLORIDE: 9 INJECTION, SOLUTION INTRAVENOUS at 13:43

## 2024-10-01 RX ADMIN — PROPOFOL 150 MG: 10 INJECTION, EMULSION INTRAVENOUS at 13:45

## 2024-10-01 RX ADMIN — PROPOFOL 50 MG: 10 INJECTION, EMULSION INTRAVENOUS at 14:00

## 2024-10-01 RX ADMIN — PROPOFOL 50 MG: 10 INJECTION, EMULSION INTRAVENOUS at 14:05

## 2024-10-01 RX ADMIN — PROPOFOL 50 MG: 10 INJECTION, EMULSION INTRAVENOUS at 13:49

## 2024-10-01 RX ADMIN — PROPOFOL 50 MG: 10 INJECTION, EMULSION INTRAVENOUS at 13:55

## 2024-10-01 ASSESSMENT — PAIN SCALES - GENERAL
PAINLEVEL_OUTOF10: 0

## 2024-10-01 NOTE — ANESTHESIA POSTPROCEDURE EVALUATION
Department of Anesthesiology  Postprocedure Note    Patient: Maggie Lin  MRN: [id number]  YOB: 1980  Date of evaluation: 10/1/2024    Procedure Summary       Date: 10/01/24 Room / Location: [facility] 01 / [facility] ENDOSCOPY    Anesthesia Start: 1343 Anesthesia Stop: 1409    Procedures:       ESOPHAGOGASTRODUODENOSCOPY (Upper GI Region)      COLONOSCOPY DIAGNOSTIC (Lower GI Region) Diagnosis:       Iron deficiency anemia secondary to inadequate dietary iron intake      (Iron deficiency anemia secondary to inadequate dietary iron intake [D50.8])    Surgeons: Marshall Yepez MD Responsible Provider: Jose Guadalupe Bustos MD    Anesthesia Type: MAC ASA Status: 3            Anesthesia Type: No value filed.    Luiz Phase I: Luiz Score: 10    Luiz Phase II:      Anesthesia Post Evaluation    Patient location during evaluation: bedside  Nausea & Vomiting: no nausea  Cardiovascular status: blood pressure returned to baseline  Respiratory status: acceptable  Hydration status: euvolemic    No notable events documented.

## 2024-10-01 NOTE — OP NOTE
[address] [address]        Colonoscopy Operative Report    Maggie Lin  [id number]  1980      Procedure Type:   Colonoscopy --diagnostic     Indications:    Iron deficiency anemia         Pre-operative Diagnosis: see indication above    Post-operative Diagnosis:  See findings below    :  Marshall Yepez MD    Staff: Circulator: Mary Washington RN  Endoscopy Technician: Krzysztof Sharma     Referring Provider: AMBREEN SANDOVAL MD, Patito Whitehead MD      Sedation:  MAC      Procedure Details:  After informed consent was obtained with all risks and benefits of procedure explained and preoperative exam completed, the patient was taken to the endoscopy suite and placed in the left lateral decubitus position.  Upon sequential sedation as per above, a digital rectal exam was performed demonstrating internal hemorrhoids.  The Olympus pediatric videocolonoscope  was inserted in the rectum and carefully advanced to the terminal ileum.  The cecum was identified by the ileocecal valve and appendiceal orifice.  The quality of preparation was good.  The colonoscope was slowly withdrawn with careful evaluation between folds. Retroflexion in the rectum was completed .     Findings:   Normal colon and terminal ileum      Specimen Removed:  none    Complications: None.     EBL:  None.    Impression:     As above    Recommendations:  High fiber diet education  Repeat colonoscopy in 10 years  If anemia persists, could consider video capsule endoscopy next  Follow up with Dr. AMBREEN Sandoval and PCP    Signed By: Marshall Yepez MD     10/1/2024  2:12 PM

## 2024-10-01 NOTE — OP NOTE
[facility]  [address], [place] [address]          Esophago- Gastroduodenoscopy (EGD) Procedure Note    Maggie Lin  1980  [id number]      Procedure: Endoscopic Gastroduodenoscopy with biopsy    Indication: iron deficiency anemia    Pre-operative Diagnosis: see indication above    Post-operative Diagnosis: see findings below    : Marshall Yepez MD    Staff: Circulator: Mary Washington RN  Endoscopy Technician: Krzysztof Sharma     Referring Provider:  Patito Whitehead MD      Anesthesia/Sedation: MAC        Procedure Details     After informed consent was obtained for the procedure, with all risks and benefits of procedure explained the patient was taken to the endoscopy suite and placed in the left lateral decubitus position.  Following sequential administration of sedation as per above, the endoscope was inserted into the mouth and advanced under direct vision to second portion of the duodenum. A careful inspection was made as the gastroscope was withdrawn, including a retroflexed view of the proximal stomach; findings and interventions are described below.      Findings:   Esophagus: normal  Stomach: status post sleeve gastrectomy. Patchy antral erythema - cold biopsies taken  Duodenum: normal to second portion - cold biopsies taken    Specimens: 1. Duodenum, 2. Gastric         EBL: None      Complications:   None; patient tolerated the procedure well.           Impression:    As above    Recommendations:  Follow up surgical pathology  Proceed to colonoscopy    Signed By: Marshall Yepez MD     10/1/2024  2:11 PM

## 2024-10-01 NOTE — ANESTHESIA PRE PROCEDURE
Department of Anesthesiology  Preprocedure Note       Name:  Maggie Lin   Age:  44 y.o.  :  1980                                          MRN:  [id number]         Date:  10/1/2024      Surgeon: Surgeon(s):  Ismael Marquez MD    Procedure: Procedure(s):  ESOPHAGOGASTRODUODENOSCOPY  COLONOSCOPY DIAGNOSTIC    Medications prior to admission:   Prior to Admission medications    Medication Sig Start Date End Date Taking? Authorizing Provider   Multiple Vitamins-Minerals (THERAPEUTIC MULTIVITAMIN-MINERALS) tablet Take 1 tablet by mouth daily   Yes Provider, MD Kath   pantoprazole (PROTONIX) 40 MG tablet Take 1 tablet by mouth every morning (before breakfast) 24  Yes Patito Whitehead MD   albuterol sulfate HFA (PROVENTIL;VENTOLIN;PROAIR) 108 (90 Base) MCG/ACT inhaler Inhale 2 puffs into the lungs every 4 hours as needed for Shortness of Breath 3/6/17  Yes Automatic Reconciliation, Ar   fluticasone-salmeterol (ADVAIR DISKUS) 500-50 MCG/ACT AEPB diskus inhaler Inhale 1 puff into the lungs in the morning and 1 puff in the evening. 16  Yes Automatic Reconciliation, Ar   montelukast (SINGULAIR) 10 MG tablet Take 1 tablet by mouth daily 16  Yes Automatic Reconciliation, Ar   albuterol (PROVENTIL) (2.5 MG/3ML) 0.083% nebulizer solution 2.5 mg = 3 mL, Inhalation, every 6 hours, 3 Refills, Dispense: 1,080, mL, Pharmacy [facility] [id number]  Patient taking differently: Take 3 mLs by nebulization every 4 hours as needed for Shortness of Breath 2.5 mg = 3 mL, Inhalation, every 6 hours, 3 Refills, Dispense: 1,080, mL, Pharmacy [facility] [address] 9/15/23   Patito Whitehead MD   ferrous sulfate (IRON 325) 325 (65 Fe) MG tablet Take 1 tablet by mouth every morning (before breakfast)  Patient not taking: Reported on 10/1/2024 9/4/23   Charles Christina, APRN - NP   MAGNESIUM PO Take by mouth 3 times daily  Patient not taking: Reported on 10/1/2024    Automatic Reconciliation, Ar   vitamin D3

## 2024-10-01 NOTE — DISCHARGE INSTRUCTIONS
[facility]  [address] [place] [id number]    EGD/COLON DISCHARGE INSTRUCTIONS    Maggie Lin  [id number]  1980    Discomfort:  Sore throat- throat lozenges or warm salt water gargle  redness at IV site- apply warm compress to area; if redness or soreness persist- contact your physician  Gaseous discomfort- walking, belching will help relieve any discomfort  You may not operate a vehicle for 12 hours  You may not engage in an occupation involving machinery or appliances for rest of today  You may not drink alcoholic beverages for at least 12 hours  Avoid making any critical decisions for at least 24 hour  DIET  You may resume your regular diet - however -  remember your colon is empty and a heavy meal will produce gas.   Avoid these foods:  vegetables, fried / greasy foods, carbonated drinks    ACTIVITY  You may resume your normal daily activities   Spend the remainder of the day resting -  avoid any strenuous activity.    CALL M.D.  ANY SIGN OF   Increasing pain, nausea, vomiting  Abdominal distension (swelling)  New increased bleeding (oral or rectal)  Fever (chills)  Pain in chest area  Bloody discharge from nose or mouth  Shortness of breath    Follow-up Instructions:   Call Dr. Marshall Yepez for any questions or problems.  Telephone # [phone]    ENDOSCOPY FINDINGS:   Your endoscopy showed mild gastritis, for which biopsies were taken. We will contact you about the pathology results. Your colonoscopy showed no polyps. Your next colonoscopy will be due in 10 years. Please follow up with your PCP.    Signed By: Marshall Yepez MD     10/1/2024  2:09 PM

## 2024-10-01 NOTE — H&P
[facility] [address] [address] [phone]        History and Physical     NAME:  Magige Lin   :  1980   MRN:  [id number]         HPI:  Maggie Lin is a 44 y.o. female here for EGD and colonoscopy. Patient was noted to have iron deficiency anemia. No obvious blood loss. No prior EGD or colonoscopy. Does report intermittent acid reflux symptoms controlled with daily protonix. No family h/o colon cancer.     Past Surgical History:   Procedure Laterality Date    ENDOSCOPY, COLON, DIAGNOSTIC      GASTRECTOMY  2016    lap sleeve gastrectomy by Dr Chidi Cruz    GYN  t-14    d and c post miscarriage    GYN      c section    GYN  14    HYSTEROSCOPIC REMOVAL OF IUD    HYSTEROSCOPY N/A 2023    Hysteroscopy D&C performed by Bhavna Arrieta MD at [facility] MAIN OR    KNEE ARTHROSCOPY      L KNEE    MYOMECTOMY N/A 2023    ABDOMINAL MYOMECTOMY, performed by Bhavna Arrieta MD at [facility] MAIN OR    ORTHOPEDIC SURGERY      right tendon repair at age 10-12    ORTHOPEDIC SURGERY      right hand     Past Medical History:   Diagnosis Date    Anemia     Arthritis     Asthma     [facility]    Cerebral artery occlusion with cerebral infarction (HCC) 2016    TIA, NO RESIDUAL    COPD (chronic obstructive pulmonary disease) (HCC)     Lung disease     Morbid obesity     Rash 2019    It happens when working out.    Sleep apnea     DOES NOT WEAR HER CPAP     Social History     Tobacco Use    Smoking status: Never    Smokeless tobacco: Never   Vaping Use    Vaping status: Never Used   Substance Use Topics    Alcohol use: No    Drug use: Never     No current facility-administered medications on file prior to encounter.     Current Outpatient Medications on File Prior to Encounter   Medication Sig Dispense Refill    Multiple Vitamins-Minerals (THERAPEUTIC MULTIVITAMIN-MINERALS) tablet Take 1 tablet by mouth daily      pantoprazole (PROTONIX) 40 MG tablet 
without esophagitis    Allergic rhinitis    Severe anemia    Class 3 severe obesity due to excess calories with serious comorbidity in adult    S/P myomectomy    Hiatal hernia     Plan:   The patient was counseled at length about the risks of patricia Covid-19 in the kaci-operative and post-operative states including the recovery window of their procedure.  The patient was made aware that patricia Covid-19 after a surgical procedure may worsen their prognosis for recovering from the virus and lend to a higher morbidity and or mortality risk.  The patient was given the options of postponing their procedure. All of the risks, benefits, and alternatives were discussed. The patient does wish to proceed with the procedure.  Endoscopic procedure with MAC     Signed By: Marshall Yeepz MD     10/1/2024  1:39 PM

## 2024-10-01 NOTE — PROGRESS NOTES
Verified patient name and date of birth, scheduled procedure, and informed consent. Reviewed general discharge instructions and  information.  Assessed patient. Awake, alert, and oriented per baseline. Vital signs stable (see vital sign flowsheet). Respiratory status within defined limits, abdomen soft and non tender. Skin with in defined limits.     Initial RN admission and assessment performed and documented in Endoscopy navigator.     Patient evaluated by anesthesia in pre-procedure holding.     All procedural vital signs, airway assessment, and level of consciousness information monitored and recorded by anesthesia staff on the anesthesia record.     Report received from CRNA post procedure.  Patient transported to recovery area by RN.    Endoscopy post procedure time out was performed and specimens were verified with physician.    Endoscope was pre-cleaned at bedside immediately following procedure by [place].

## 2024-10-03 ENCOUNTER — TELEPHONE (OUTPATIENT)
Age: 44
End: 2024-10-03

## 2024-10-03 ENCOUNTER — OFFICE VISIT (OUTPATIENT)
Age: 44
End: 2024-10-03

## 2024-10-03 ENCOUNTER — APPOINTMENT (OUTPATIENT)
Facility: HOSPITAL | Age: 44
End: 2024-10-03
Payer: COMMERCIAL

## 2024-10-03 ENCOUNTER — HOSPITAL ENCOUNTER (EMERGENCY)
Facility: HOSPITAL | Age: 44
Discharge: HOME | End: 2024-10-03
Attending: STUDENT IN AN ORGANIZED HEALTH CARE EDUCATION/TRAINING PROGRAM
Payer: COMMERCIAL

## 2024-10-03 VITALS
HEART RATE: 85 BPM | WEIGHT: 293 LBS | OXYGEN SATURATION: 98 % | DIASTOLIC BLOOD PRESSURE: 104 MMHG | SYSTOLIC BLOOD PRESSURE: 170 MMHG | RESPIRATION RATE: 18 BRPM | BODY MASS INDEX: 51.88 KG/M2 | TEMPERATURE: 97.3 F

## 2024-10-03 VITALS
TEMPERATURE: 97.7 F | WEIGHT: 293 LBS | OXYGEN SATURATION: 99 % | HEART RATE: 88 BPM | DIASTOLIC BLOOD PRESSURE: 85 MMHG | HEIGHT: 64 IN | SYSTOLIC BLOOD PRESSURE: 131 MMHG | BODY MASS INDEX: 50.02 KG/M2 | RESPIRATION RATE: 17 BRPM

## 2024-10-03 DIAGNOSIS — E66.01 MORBID OBESITY: Primary | ICD-10-CM

## 2024-10-03 DIAGNOSIS — K21.9 GASTROESOPHAGEAL REFLUX DISEASE WITHOUT ESOPHAGITIS: ICD-10-CM

## 2024-10-03 DIAGNOSIS — K44.9 HIATAL HERNIA: ICD-10-CM

## 2024-10-03 DIAGNOSIS — R63.5 WEIGHT GAIN, ABNORMAL: ICD-10-CM

## 2024-10-03 DIAGNOSIS — H53.8 BLURRY VISION: ICD-10-CM

## 2024-10-03 DIAGNOSIS — R10.84 GENERALIZED ABDOMINAL PAIN: ICD-10-CM

## 2024-10-03 DIAGNOSIS — H54.7 VISION PROBLEM: Primary | ICD-10-CM

## 2024-10-03 LAB
ALBUMIN SERPL-MCNC: 3.6 G/DL (ref 3.5–5)
ALBUMIN/GLOB SERPL: 0.8 (ref 1.1–2.2)
ALP SERPL-CCNC: 88 U/L (ref 45–117)
ALT SERPL-CCNC: 22 U/L (ref 12–78)
ANION GAP SERPL CALC-SCNC: 2 MMOL/L (ref 2–12)
AST SERPL-CCNC: 16 U/L (ref 15–37)
BASOPHILS # BLD: 0.1 K/UL (ref 0–0.1)
BASOPHILS NFR BLD: 1 % (ref 0–1)
BILIRUB SERPL-MCNC: 0.2 MG/DL (ref 0.2–1)
BUN SERPL-MCNC: 12 MG/DL (ref 6–20)
BUN/CREAT SERPL: 17 (ref 12–20)
CALCIUM SERPL-MCNC: 10.2 MG/DL (ref 8.5–10.1)
CHLORIDE SERPL-SCNC: 108 MMOL/L (ref 97–108)
CO2 SERPL-SCNC: 27 MMOL/L (ref 21–32)
COMMENT:: NORMAL
CREAT SERPL-MCNC: 0.72 MG/DL (ref 0.55–1.02)
DIFFERENTIAL METHOD BLD: ABNORMAL
EKG ATRIAL RATE: 67 BPM
EKG DIAGNOSIS: NORMAL
EKG P AXIS: 64 DEGREES
EKG P-R INTERVAL: 164 MS
EKG Q-T INTERVAL: 354 MS
EKG QRS DURATION: 92 MS
EKG QTC CALCULATION (BAZETT): 374 MS
EKG R AXIS: 76 DEGREES
EKG T AXIS: 38 DEGREES
EKG VENTRICULAR RATE: 67 BPM
EOSINOPHIL # BLD: 0 K/UL (ref 0–0.4)
EOSINOPHIL NFR BLD: 0 % (ref 0–7)
ERYTHROCYTE [DISTWIDTH] IN BLOOD BY AUTOMATED COUNT: 20.9 % (ref 11.5–14.5)
GLOBULIN SER CALC-MCNC: 4.5 G/DL (ref 2–4)
GLUCOSE SERPL-MCNC: 81 MG/DL (ref 65–100)
HCT VFR BLD AUTO: 33.3 % (ref 35–47)
HGB BLD-MCNC: 9.7 G/DL (ref 11.5–16)
IMM GRANULOCYTES # BLD AUTO: 0 K/UL (ref 0–0.04)
IMM GRANULOCYTES NFR BLD AUTO: 0 % (ref 0–0.5)
LYMPHOCYTES # BLD: 2.8 K/UL (ref 0.8–3.5)
LYMPHOCYTES NFR BLD: 42 % (ref 12–49)
MCH RBC QN AUTO: 21.6 PG (ref 26–34)
MCHC RBC AUTO-ENTMCNC: 29.1 G/DL (ref 30–36.5)
MCV RBC AUTO: 74.2 FL (ref 80–99)
MONOCYTES # BLD: 0.6 K/UL (ref 0–1)
MONOCYTES NFR BLD: 9 % (ref 5–13)
NEUTS SEG # BLD: 3.2 K/UL (ref 1.8–8)
NEUTS SEG NFR BLD: 48 % (ref 32–75)
NRBC # BLD: 0 K/UL (ref 0–0.01)
NRBC BLD-RTO: 0 PER 100 WBC
PLATELET # BLD AUTO: 280 K/UL (ref 150–400)
POTASSIUM SERPL-SCNC: 4.2 MMOL/L (ref 3.5–5.1)
PROT SERPL-MCNC: 8.1 G/DL (ref 6.4–8.2)
RBC # BLD AUTO: 4.49 M/UL (ref 3.8–5.2)
RBC MORPH BLD: ABNORMAL
SODIUM SERPL-SCNC: 137 MMOL/L (ref 136–145)
SPECIMEN HOLD: NORMAL
TROPONIN I SERPL HS-MCNC: 7 NG/L (ref 0–51)
WBC # BLD AUTO: 6.7 K/UL (ref 3.6–11)

## 2024-10-03 PROCEDURE — 36415 COLL VENOUS BLD VENIPUNCTURE: CPT

## 2024-10-03 PROCEDURE — 85025 COMPLETE CBC W/AUTO DIFF WBC: CPT

## 2024-10-03 PROCEDURE — 80053 COMPREHEN METABOLIC PANEL: CPT

## 2024-10-03 PROCEDURE — 93005 ELECTROCARDIOGRAM TRACING: CPT

## 2024-10-03 PROCEDURE — 84484 ASSAY OF TROPONIN QUANT: CPT

## 2024-10-03 PROCEDURE — 71046 X-RAY EXAM CHEST 2 VIEWS: CPT

## 2024-10-03 PROCEDURE — 99285 EMERGENCY DEPT VISIT HI MDM: CPT

## 2024-10-03 RX ORDER — ERGOCALCIFEROL 1.25 MG/1
50000 CAPSULE, LIQUID FILLED ORAL WEEKLY
Qty: 12 CAPSULE | Refills: 0 | Status: SHIPPED | OUTPATIENT
Start: 2024-10-03

## 2024-10-03 ASSESSMENT — ENCOUNTER SYMPTOMS
ABDOMINAL PAIN: 1
SHORTNESS OF BREATH: 0
VOMITING: 0
WHEEZING: 0
NAUSEA: 1

## 2024-10-03 ASSESSMENT — PATIENT HEALTH QUESTIONNAIRE - PHQ9
SUM OF ALL RESPONSES TO PHQ QUESTIONS 1-9: 0
SUM OF ALL RESPONSES TO PHQ9 QUESTIONS 1 & 2: 0
SUM OF ALL RESPONSES TO PHQ QUESTIONS 1-9: 0
SUM OF ALL RESPONSES TO PHQ QUESTIONS 1-9: 0
1. LITTLE INTEREST OR PLEASURE IN DOING THINGS: NOT AT ALL
SUM OF ALL RESPONSES TO PHQ QUESTIONS 1-9: 0
2. FEELING DOWN, DEPRESSED OR HOPELESS: NOT AT ALL

## 2024-10-03 ASSESSMENT — PAIN SCALES - GENERAL: PAINLEVEL_OUTOF10: 0

## 2024-10-03 NOTE — PROGRESS NOTES
Chief Complaint   Patient presents with    Follow-up     [facility]     Weight Management       Maggie Lin 1980 presents today for presurgical bariatric evaluation in preparation for:     Procedure Revision from sleeve to gastric bypass with hiatal hernia repair  Surgeon Dr. Chidi Cruz    Presents today with complaints of abdominal pain, blurry vision and nausea since her EGD.  She has been able to eat and drink fluids since EGD .   She has been having blurry vision for the past 3 months. She states that when she works out 30 minutes into the workout she is getting blurry vision. She has not seen an eye doctor.     Last Weight Metrics:      10/3/2024     1:13 PM 10/1/2024    11:22 AM 8/20/2024     8:08 AM 8/15/2024     9:57 AM 8/14/2024     8:10 AM 8/12/2024     1:34 PM 6/3/2024     2:33 PM   Weight Loss Metrics   Height 5' 4\" 5' 4\" 5' 4\" 5' 4\" 5' 4\" 5' 4\" 5' 4\"   Weight - Scale 299 lbs 13 oz 301 lbs 6 oz 301 lbs 301 lbs 13 oz 300 lbs 300 lbs 297 lbs 8 oz   BMI (Calculated) 51.6 kg/m2 51.8 kg/m2 51.8 kg/m2 51.9 kg/m2 51.6 kg/m2 51.6 kg/m2 51.2 kg/m2       [x] Bariatric comorbidities present: morbid obesity and obstructive sleep apnea    [x] Ambulatory status: independent    [x] The patient's reported level of exercise: 4-5 times a week.  Exercise: encouraged to perform at least 30 mins of at least moderate-intensity physical activity on 5 or more days a week. The activity can be undertaken in one session or several lasting 10 mins or more. Use of a wearable fitness device may help meet activity goals and was recommended.     [x] Nutrient screening with iron studies, B12, folic acid, and 25-vitamin D   Iron  28- has seen Hematology in the past.   B12/Folate   857    D   24.1  [] Sleep apnea screening  [] Sleep Medicine referral         [] GI evaluation     Upper GI results   IMPRESSION:        1. Severe gastroesophageal reflux.     2. Questionable small hiatal hernia.     3.. Status post sleeve

## 2024-10-03 NOTE — ED TRIAGE NOTES
Triage: Pt arrives ambulatory with steady gait from her doctor's office with CC of blurred vision that is started to resolve. Pt reports she gets blurred vision every single day after she works out. She says the blurred vision lasts 2-3 hours after working out. She reports that is about how long it lasted today. She denies numbness and tingling. Denies changes to speech. Denies unilateral weakness.

## 2024-10-03 NOTE — TELEPHONE ENCOUNTER
Attempted to call patient regarding [facility] insurance requirements. LVM to return call.    - patient needs clearance from PCP due to blurry vision per henok.

## 2024-10-03 NOTE — TELEPHONE ENCOUNTER
Patient returned call regarding [facility] requirement and verified that she will be making an appt with her PCP soon to get clearance.

## 2024-10-03 NOTE — TELEPHONE ENCOUNTER
Phone call came in via ECC/Triage.    The patient c/o Nausea, dizziness, blurry vision since she had her EGD on Tuesday, October 1st.    She is also positive for, poor appetite, difficulty swallowing, nausea, and abdominal pain.    Denied other GI symptoms vomiting, flatulence, and diarrhea  Can hold down liquids for an hour Yes  Can hold down solids for an hour Yes    Alleviating factors movement  Has patient taken any new medications or food No  How many times has patient vomited in last 24 hours 0  Coffee ground emesis present No     I advised the patient to go to the ED for evaluation.Patient verbalized understanding of information discussed w/ no further questions at this time.

## 2024-10-03 NOTE — PROGRESS NOTES
Identified patient with two patient identifiers (name and ). Reviewed chart in preparation for visit and have obtained necessary documentation.    Maggie Lin is a 44 y.o. female  Chief Complaint   Patient presents with    Follow-up     [facility]     Weight Management     /85 (Site: Right Upper Arm, Position: Sitting, Cuff Size: Thigh)   Pulse 88   Temp 97.7 °F (36.5 °C) (Oral)   Resp 17   Ht 1.626 m (5' 4\")   Wt 136 kg (299 lb 12.8 oz)   SpO2 99%   BMI 51.46 kg/m²     1. Have you been to the ER, urgent care clinic since your last visit?  Hospitalized since your last visit?no    2. Have you seen or consulted any other health care providers outside of the [facility] since your last visit?  Include any pap smears or colon screening. Yes Pulmonary

## 2024-10-03 NOTE — ED NOTES
H/o TIA, blurry vision everyday during working out for past \"many months,\" lasts for approximately 2-4 hours.  Today is not different than any other day after working out.  Works out 4-5x/week.  Denies hemispheric weakness, speech difficulty, chest pain, or sob. Colonoscopy/endoscopy 2 days ago.      5:34 PM  I have evaluated the patient as the Provider in Rapid Medical Evaluation (RME). I have reviewed her vital signs and the triage nurse assessment. I have talked with the patient and any available family and advised that I am the provider in triage and have ordered the appropriate study to initiate their work up based on the clinical presentation during my assessment. I have advised that the patient will be accommodated in the Main ED as soon as possible. I have also requested to contact the triage nurse or myself immediately if the patient experiences any changes in their condition during this brief waiting period.  DAMIAN Gagnon William J, PA-C  10/03/24 0472

## 2024-10-04 NOTE — ED PROVIDER NOTES
Procedures    See MDM for documentation of critical care time.       FINAL IMPRESSION      1. Vision problem          DISPOSITION/PLAN   DISPOSITION Decision To Discharge 10/03/2024 10:05:23 PM      PATIENT REFERRED TO:  Patito Whitehead MD  [address]  [facility] [address] [phone]    Call in 1 day  Regarding your ER visit today and for recheck    [facility]  Call for appointment [phone]  After hours [phone]  [place]: [address]/   [place]: [address], [address]/  [place]: [address] [facility] [address], [address]/  [place]: [address] [address] [facility] [address]    Call to make an appointment with the eye doctor regarding your ER visit today    Emergency Department    Go to   If symptoms worsen      DISCHARGE MEDICATIONS:  Discharge Medication List as of 10/3/2024 10:05 PM            (Please note that portions of this note were completed with a voice recognition program.  Efforts were made to edit the dictations but occasionally words are mis-transcribed.)    Lisa Prater DO (electronically signed)  Emergency Attending Physician / Physician Assistant / Nurse Practitioner              Lisa Prater DO  10/04/24 0356

## 2024-10-15 ENCOUNTER — TELEPHONE (OUTPATIENT)
Age: 44
End: 2024-10-15

## 2024-10-15 NOTE — TELEPHONE ENCOUNTER
----- Message from Jennifer CONRAD sent at 10/15/2024  2:57 PM EDT -----  Regarding: ECC Referral Request  ECC Referral Request    Reason for referral request: Specialty Provider    Specialist/Lab/Test patient is requesting (if known):Eye Doctor    Specialist Phone Number (if applicable):    Additional Information provider:   Patito Whitehead MD    The patient requested to be seen with an eye doctor and requested for referral. Please call back the patient for further assistance. Thank you  --------------------------------------------------------------------------------------------------------------------------    Relationship to Patient: Self     Call Back Information: OK to leave message on voicemail  Preferred Call Back Number: Phone [phone]

## 2024-10-16 ENCOUNTER — TELEPHONE (OUTPATIENT)
Age: 44
End: 2024-10-16

## 2024-10-16 ENCOUNTER — OFFICE VISIT (OUTPATIENT)
Age: 44
End: 2024-10-16

## 2024-10-16 DIAGNOSIS — E66.01 MORBID OBESITY: Primary | ICD-10-CM

## 2024-10-16 NOTE — TELEPHONE ENCOUNTER
Patient states she needs a call back in reference to ER visit on 10/3/24 that she was seen for Vision Issues & needs to discuss referral & getting cleared for her surgery. Please call to advise. Thank you

## 2024-10-16 NOTE — TELEPHONE ENCOUNTER
Identified patient with two patient identifiers (name and ). Reviewed chart in preparation for encounter and have obtained necessary documentation.    Called and spoke with patient to inform her we still need clearance from her PCP due to her blurry vision per henok. After receiving clearance we can schedule final review with amrit. Patient understood what was discussed and thankful for the call.

## 2024-10-16 NOTE — PROGRESS NOTES
meals per day. Snack choices include seeds, popcorn, veggies. Pt is eating refined carbohydrate foods (bread, pasta, rice, potatoes) 2-3 times per month. Pt is eating sweets/desserts twice a month. Pt is using baked, air fried, pan sauteed, steamed cooking methods. Pt is eating meals prepared outside of the home once a month. Pt is drinking water, unsweetened tea, coffee. Pt reports no to emotional eating.         Physical Activity/Exercise  An exercise presentation was provided including information about exercise programs available both before and after surgery. We talked about the importance of increasing daily physical activity and beginning to develop an exercise regimen/routine. We talked about exercise as being an important part of long term weight loss after surgery.     Comments:  During class, I discussed with patient the importance of getting into an exercise routine.  Pt is currently exercising 3-4 times per week for activity.  Pt has been encouraged to maintain and increase as tolerated.     Behavior Modification       We talked about how to eat more mindfully. Tips and recommendations for how to make these changes were provided. Pt was encouraged to keep a food journal and record what they were taking in daily.         Overall Assessment: Pt demonstrates appropriate lifestyle changes evidenced by reported changes and reported wt loss this past month. Demonstrates understanding of nutrition guidelines and appears to be an appropriate candidate for surgery at this time.     Patient-Set Goals:   1. Nutrition - stay on track with protein intake  2. Exercise - more resistance training and cardio  3. Behavior -stress management     Janis Carson, AUSTEN  10/16/2024

## 2024-10-22 ASSESSMENT — ENCOUNTER SYMPTOMS: SHORTNESS OF BREATH: 0

## 2024-10-22 NOTE — PROGRESS NOTES
HISTORY OF PRESENT ILLNESS  Maggie Lin is a 44 y.o. female.  HPI  Last here 8/12/24. Pt is here for pre-op.  For possible revision of bariatric surgery which is not yet currently scheduled     EKG 10/3/24 was nsr and unchanged. She had a normal stress test 8/24 EF was 60%.    Pt denies cp, sob, palpitations, orthopnea, claudication, PND, and new swelling in legs  Pt can sleep laying flat  Pt can walk up a set of stairs  Pt can walk around the mall   Pt can vacuum and do laundry     Functional mets >>4     BP today is 135/81        Patient with significant obesity  History of past gastric bypass  Wt today is 304 lbs, lov at hospital 300 lbs, up 19 lbs since lov  She has been meeting with bariatric surgery has a history of sleeve gastrectomy  She is scheduling a revision of gastric bypass with Dr Cruz      Reviewed labs   Ordered labs    She was in the ED for blurry vision 10/3/24, was told to f/u with eye doctor and PCP she has not yet scheduled an appointment with the eye doctor    Reviewed chest XR 10/3/24  Final Result     No acute abnormality    She states that when she is walking or exercising more than normal she starts getting blurry vision sporadically within 30 minutes x 6-7 months. This occurs every time with exertion, she thought it was coffee at first but she cut this out and it did not completely fix the blurry vision. She denies feeling lightheaded or dizzy while this is happening, states it is just blurry vision.  Sometimes different dots floating she denies any visual changes during every day. Discussed she needs to see an eye doctor, she will schedule so she can be cleared for surgery         Completed stress test for atypical chest pain last office visit symptoms have resolved  Reviewed stress test 8/21/24  IMPRESSION:  1. No definite evidence of ischemia and/or infarction. There is slight diminished activity in the anterior wall most likely related to attenuation artifact.  2. LVEF equals

## 2024-10-23 ENCOUNTER — TELEPHONE (OUTPATIENT)
Age: 44
End: 2024-10-23

## 2024-10-23 ENCOUNTER — OFFICE VISIT (OUTPATIENT)
Age: 44
End: 2024-10-23
Payer: COMMERCIAL

## 2024-10-23 VITALS
RESPIRATION RATE: 20 BRPM | HEIGHT: 64 IN | OXYGEN SATURATION: 100 % | WEIGHT: 293 LBS | BODY MASS INDEX: 50.02 KG/M2 | DIASTOLIC BLOOD PRESSURE: 81 MMHG | HEART RATE: 86 BPM | SYSTOLIC BLOOD PRESSURE: 135 MMHG | TEMPERATURE: 97.8 F

## 2024-10-23 DIAGNOSIS — Z00.00 PHYSICAL EXAM: ICD-10-CM

## 2024-10-23 DIAGNOSIS — E66.813 CLASS 3 SEVERE OBESITY DUE TO EXCESS CALORIES WITH SERIOUS COMORBIDITY AND BODY MASS INDEX (BMI) OF 45.0 TO 49.9 IN ADULT: ICD-10-CM

## 2024-10-23 DIAGNOSIS — E78.2 MIXED HYPERLIPIDEMIA: ICD-10-CM

## 2024-10-23 DIAGNOSIS — E66.01 CLASS 3 SEVERE OBESITY DUE TO EXCESS CALORIES WITH SERIOUS COMORBIDITY AND BODY MASS INDEX (BMI) OF 45.0 TO 49.9 IN ADULT: ICD-10-CM

## 2024-10-23 DIAGNOSIS — J45.20 MILD INTERMITTENT ASTHMA WITHOUT COMPLICATION: ICD-10-CM

## 2024-10-23 DIAGNOSIS — J43.9 PULMONARY EMPHYSEMA, UNSPECIFIED EMPHYSEMA TYPE (HCC): ICD-10-CM

## 2024-10-23 DIAGNOSIS — H53.8 BLURRY VISION: ICD-10-CM

## 2024-10-23 DIAGNOSIS — R73.01 IFG (IMPAIRED FASTING GLUCOSE): ICD-10-CM

## 2024-10-23 DIAGNOSIS — Z01.810 PREOP CARDIOVASCULAR EXAM: Primary | ICD-10-CM

## 2024-10-23 DIAGNOSIS — G47.33 OBSTRUCTIVE SLEEP APNEA: ICD-10-CM

## 2024-10-23 DIAGNOSIS — K21.9 GASTROESOPHAGEAL REFLUX DISEASE WITHOUT ESOPHAGITIS: ICD-10-CM

## 2024-10-23 DIAGNOSIS — D64.9 SEVERE ANEMIA: ICD-10-CM

## 2024-10-23 DIAGNOSIS — D50.9 IRON DEFICIENCY ANEMIA, UNSPECIFIED IRON DEFICIENCY ANEMIA TYPE: ICD-10-CM

## 2024-10-23 LAB
CHOLEST SERPL-MCNC: 210 MG/DL
ERYTHROCYTE [DISTWIDTH] IN BLOOD BY AUTOMATED COUNT: 21.1 % (ref 11.5–14.5)
EST. AVERAGE GLUCOSE BLD GHB EST-MCNC: 82 MG/DL
HBA1C MFR BLD: 4.5 % (ref 4–5.6)
HCT VFR BLD AUTO: 35.8 % (ref 35–47)
HDLC SERPL-MCNC: 58 MG/DL
HDLC SERPL: 3.6 (ref 0–5)
HGB BLD-MCNC: 10.6 G/DL (ref 11.5–16)
LDLC SERPL CALC-MCNC: 137.2 MG/DL (ref 0–100)
MCH RBC QN AUTO: 22.5 PG (ref 26–34)
MCHC RBC AUTO-ENTMCNC: 29.6 G/DL (ref 30–36.5)
MCV RBC AUTO: 76 FL (ref 80–99)
NRBC # BLD: 0 K/UL (ref 0–0.01)
NRBC BLD-RTO: 0 PER 100 WBC
PLATELET # BLD AUTO: 264 K/UL (ref 150–400)
RBC # BLD AUTO: 4.71 M/UL (ref 3.8–5.2)
TRIGL SERPL-MCNC: 74 MG/DL
VLDLC SERPL CALC-MCNC: 14.8 MG/DL
WBC # BLD AUTO: 5.7 K/UL (ref 3.6–11)

## 2024-10-23 PROCEDURE — 99214 OFFICE O/P EST MOD 30 MIN: CPT | Performed by: INTERNAL MEDICINE

## 2024-10-23 SDOH — ECONOMIC STABILITY: FOOD INSECURITY: WITHIN THE PAST 12 MONTHS, THE FOOD YOU BOUGHT JUST DIDN'T LAST AND YOU DIDN'T HAVE MONEY TO GET MORE.: NEVER TRUE

## 2024-10-23 SDOH — ECONOMIC STABILITY: INCOME INSECURITY: HOW HARD IS IT FOR YOU TO PAY FOR THE VERY BASICS LIKE FOOD, HOUSING, MEDICAL CARE, AND HEATING?: NOT HARD AT ALL

## 2024-10-23 SDOH — ECONOMIC STABILITY: FOOD INSECURITY: WITHIN THE PAST 12 MONTHS, YOU WORRIED THAT YOUR FOOD WOULD RUN OUT BEFORE YOU GOT MONEY TO BUY MORE.: NEVER TRUE

## 2024-10-23 ASSESSMENT — PATIENT HEALTH QUESTIONNAIRE - PHQ9
SUM OF ALL RESPONSES TO PHQ9 QUESTIONS 1 & 2: 0
SUM OF ALL RESPONSES TO PHQ QUESTIONS 1-9: 0
SUM OF ALL RESPONSES TO PHQ QUESTIONS 1-9: 0
2. FEELING DOWN, DEPRESSED OR HOPELESS: NOT AT ALL
SUM OF ALL RESPONSES TO PHQ QUESTIONS 1-9: 0
SUM OF ALL RESPONSES TO PHQ QUESTIONS 1-9: 0
1. LITTLE INTEREST OR PLEASURE IN DOING THINGS: NOT AT ALL

## 2024-10-29 ENCOUNTER — OFFICE VISIT (OUTPATIENT)
Age: 44
End: 2024-10-29
Payer: COMMERCIAL

## 2024-10-29 VITALS
DIASTOLIC BLOOD PRESSURE: 84 MMHG | HEART RATE: 61 BPM | BODY MASS INDEX: 50.02 KG/M2 | RESPIRATION RATE: 17 BRPM | WEIGHT: 293 LBS | OXYGEN SATURATION: 98 % | HEIGHT: 64 IN | SYSTOLIC BLOOD PRESSURE: 133 MMHG | TEMPERATURE: 98 F

## 2024-10-29 DIAGNOSIS — D50.8 IRON DEFICIENCY ANEMIA SECONDARY TO INADEQUATE DIETARY IRON INTAKE: Primary | ICD-10-CM

## 2024-10-29 DIAGNOSIS — D50.8 IRON DEFICIENCY ANEMIA SECONDARY TO INADEQUATE DIETARY IRON INTAKE: ICD-10-CM

## 2024-10-29 DIAGNOSIS — D64.9 SYMPTOMATIC ANEMIA: ICD-10-CM

## 2024-10-29 PROCEDURE — 99203 OFFICE O/P NEW LOW 30 MIN: CPT | Performed by: NURSE PRACTITIONER

## 2024-10-29 NOTE — PROGRESS NOTES
[facility]  [address]  [place] [address]  W: [phone]  F: [phone]    Hematology/Oncology Office Note:     Reason for Visit:     Maggie Lin is a 44 y.o. female who is seen today for evaluation of iron deficiency anemia, referred by Dr. Patito Whitehead.    Hematology/Oncology Treatment History:     Hematological/Oncological Diagnosis: Iron deficiency anemia  Date of Diagnosis: chronic  Treatment Course: Oral iron     History of Present Illness:     Patient is a 44 y.o. female presenting to the office as a new patient for evaluation of iron deficiency anemia.   Patient had 300mg IV Venofer in 9/2023.   She reports issues with exercise, intermittently blurred vision. She is seeing ophthalmology tomorrow. She has ice cravings and ongoing fatigue. Reports that she is cold many times, has some shortness of breath with exertion.  Patient does have asthma.     She does not have hematochezia. Had uterine fibroids removed in 2023. Had excessive vaginal bleeding prior. States that her menses now lasts about 4 days. She notes feeling drained prior to her cycle. No chance of pregnancy.   Patient is taking oral iron currently.     Patient had an endoscopy/colonoscopy done 10/2/24, which was unremarkable. She had a gastric sleeve done in 2017.     Review of systems was obtained and pertinent findings reviewed above. Past medical history, social history, family history, medications, and allergies are located in the electronic medical record.    Past Medical History:   Diagnosis Date    Anemia     Arthritis     Asthma     [facility]    Cerebral artery occlusion with cerebral infarction (HCC) 2016    TIA, NO RESIDUAL    COPD (chronic obstructive pulmonary disease) (HCC)     Lung disease     Morbid obesity     Rash 2019    It happens when working out.    Sleep apnea     DOES NOT WEAR HER CPAP     Past Surgical History:   Procedure Laterality Date

## 2024-10-29 NOTE — PROGRESS NOTES
Maggie Lin is a 44 y.o. female who presents for follow up of   Chief Complaint   Patient presents with    New Patient     LIZZY       The patient reports sob, ice cravings, fatigue and double vision.     Medications reviewed with the patient, and chart updated to reflect changes.

## 2024-11-01 DIAGNOSIS — D50.8 IRON DEFICIENCY ANEMIA SECONDARY TO INADEQUATE DIETARY IRON INTAKE: Primary | ICD-10-CM

## 2024-11-05 ENCOUNTER — TELEPHONE (OUTPATIENT)
Age: 44
End: 2024-11-05

## 2024-11-05 NOTE — TELEPHONE ENCOUNTER
Identified patient with two patient identifiers (name and ). Reviewed chart in preparation for encounter and have obtained necessary documentation.    Patient returned call and stated she was seen at [facility] for her blurred vision and will have a clearance faxed over. Patient will have blood work done this week ordered by ave and call back next week.

## 2024-11-05 NOTE — TELEPHONE ENCOUNTER
Attempted to call patient regarding PCP clearance due to her blurry vision. Per henok patient needs to have this clearance before final review. LVM to return call.

## 2024-11-07 ENCOUNTER — TELEPHONE (OUTPATIENT)
Age: 44
End: 2024-11-07

## 2024-11-07 DIAGNOSIS — D50.8 IRON DEFICIENCY ANEMIA SECONDARY TO INADEQUATE DIETARY IRON INTAKE: ICD-10-CM

## 2024-11-07 LAB
BASOPHILS # BLD: 0 K/UL (ref 0–0.1)
BASOPHILS NFR BLD: 1 % (ref 0–1)
DIFFERENTIAL METHOD BLD: ABNORMAL
EOSINOPHIL # BLD: 0.1 K/UL (ref 0–0.4)
EOSINOPHIL NFR BLD: 1 % (ref 0–7)
ERYTHROCYTE [DISTWIDTH] IN BLOOD BY AUTOMATED COUNT: 19.6 % (ref 11.5–14.5)
FERRITIN SERPL-MCNC: 5 NG/ML (ref 8–252)
HCT VFR BLD AUTO: 34.1 % (ref 35–47)
HGB BLD-MCNC: 10.3 G/DL (ref 11.5–16)
IMM GRANULOCYTES # BLD AUTO: 0 K/UL (ref 0–0.04)
IMM GRANULOCYTES NFR BLD AUTO: 0 % (ref 0–0.5)
IRON SATN MFR SERPL: 8 % (ref 20–50)
IRON SERPL-MCNC: 35 UG/DL (ref 35–150)
LYMPHOCYTES # BLD: 2.9 K/UL (ref 0.8–3.5)
LYMPHOCYTES NFR BLD: 41 % (ref 12–49)
MCH RBC QN AUTO: 23.3 PG (ref 26–34)
MCHC RBC AUTO-ENTMCNC: 30.2 G/DL (ref 30–36.5)
MCV RBC AUTO: 77.1 FL (ref 80–99)
MONOCYTES # BLD: 0.6 K/UL (ref 0–1)
MONOCYTES NFR BLD: 8 % (ref 5–13)
NEUTS SEG # BLD: 3.5 K/UL (ref 1.8–8)
NEUTS SEG NFR BLD: 49 % (ref 32–75)
NRBC # BLD: 0 K/UL (ref 0–0.01)
NRBC BLD-RTO: 0 PER 100 WBC
PLATELET # BLD AUTO: 279 K/UL (ref 150–400)
RBC # BLD AUTO: 4.42 M/UL (ref 3.8–5.2)
TIBC SERPL-MCNC: 422 UG/DL (ref 250–450)
WBC # BLD AUTO: 7.1 K/UL (ref 3.6–11)

## 2024-11-07 NOTE — TELEPHONE ENCOUNTER
10/23 pt saw PCP for PreOp.    PCP recommended pt schedule apts with specialists prior to surgery    [facility]    Pt saw [facility] Hematologist 10/29/24 & had labs drawn today    Pt requesting call back to discuss next steps for surgical clearance

## 2024-11-07 NOTE — TELEPHONE ENCOUNTER
Called pt, verified two pt identifiers.   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   Pt states just needs documentation or letter from PCP per gen surgery, stating she is still ok for surgery even with glacoma.     Pt states to fax information to Dr. Cruz [facility] gen surg [phone]

## 2024-11-08 ENCOUNTER — TELEPHONE (OUTPATIENT)
Age: 44
End: 2024-11-08

## 2024-11-08 NOTE — TELEPHONE ENCOUNTER
Attempted to call pt to schedule final review with Dr. Cruz. LVM for pt to contact our office at their earliest convenience.

## 2024-11-11 ENCOUNTER — TELEPHONE (OUTPATIENT)
Age: 44
End: 2024-11-11

## 2024-11-12 RX ORDER — ONDANSETRON 2 MG/ML
8 INJECTION INTRAMUSCULAR; INTRAVENOUS
OUTPATIENT
Start: 2024-11-19

## 2024-11-12 RX ORDER — EPINEPHRINE 1 MG/ML
0.3 INJECTION, SOLUTION, CONCENTRATE INTRAVENOUS PRN
OUTPATIENT
Start: 2024-11-19

## 2024-11-12 RX ORDER — FAMOTIDINE 10 MG/ML
20 INJECTION, SOLUTION INTRAVENOUS
OUTPATIENT
Start: 2024-11-19

## 2024-11-12 RX ORDER — SODIUM CHLORIDE 9 MG/ML
5-250 INJECTION, SOLUTION INTRAVENOUS PRN
OUTPATIENT
Start: 2024-11-19

## 2024-11-12 RX ORDER — SODIUM CHLORIDE 9 MG/ML
INJECTION, SOLUTION INTRAVENOUS CONTINUOUS
OUTPATIENT
Start: 2024-11-19

## 2024-11-12 RX ORDER — ACETAMINOPHEN 325 MG/1
650 TABLET ORAL
OUTPATIENT
Start: 2024-11-19

## 2024-11-12 RX ORDER — DIPHENHYDRAMINE HYDROCHLORIDE 50 MG/ML
50 INJECTION INTRAMUSCULAR; INTRAVENOUS
OUTPATIENT
Start: 2024-11-19

## 2024-11-12 RX ORDER — SODIUM CHLORIDE 0.9 % (FLUSH) 0.9 %
5-40 SYRINGE (ML) INJECTION PRN
OUTPATIENT
Start: 2024-11-19

## 2024-11-12 RX ORDER — HYDROCORTISONE SODIUM SUCCINATE 100 MG/2ML
100 INJECTION INTRAMUSCULAR; INTRAVENOUS
OUTPATIENT
Start: 2024-11-19

## 2024-11-12 RX ORDER — ALBUTEROL SULFATE 90 UG/1
4 INHALANT RESPIRATORY (INHALATION) PRN
OUTPATIENT
Start: 2024-11-19

## 2024-11-12 RX ORDER — HEPARIN SODIUM (PORCINE) LOCK FLUSH IV SOLN 100 UNIT/ML 100 UNIT/ML
500 SOLUTION INTRAVENOUS PRN
OUTPATIENT
Start: 2024-11-19

## 2024-11-12 NOTE — PROGRESS NOTES
Reviewed labs, she has iron deficiency anemia again. Plan to give a course of IV iron with Venofer or other approved iron.

## 2024-11-14 ENCOUNTER — TELEPHONE (OUTPATIENT)
Age: 44
End: 2024-11-14

## 2024-11-14 NOTE — TELEPHONE ENCOUNTER
Return call placed to pt. HIPAA verified by two patient identifiers.     Nurse verified IV iron appt.'s. Pt asked if she has any questions about the infusion. Pt stated \"no tanmay.\" The appt's have already been scheduled. Nurse thanked for call.

## 2024-11-14 NOTE — TELEPHONE ENCOUNTER
----- Message from JACKIE Dinh CNP sent at 11/14/2024  3:11 PM EST -----  Her iron saturation and ferritin are low. She is mildly anemic.    What questions does she have?  ----- Message -----  From: Josey Perrin  Sent: 11/13/2024   9:56 AM EST  To: Jennie Hills V RN; #    Scheduled. Spoke w/patient and made aware.    Patient is requesting someone to call and discuss her lab results. Patient stated she saw the numbers but do not know what that means. Please return call to discuss.  ----- Message -----  From: Delmy Owusu APRN - CNP  Sent: 11/12/2024   3:28 PM EST  To: Josey Perrin; Jennie JOSEPH RN    Reviewed labs, she has iron deficiency anemia again. Plan to give a course of IV iron with Venofer or other approved iron.  Please schedule, thanks

## 2024-11-19 ENCOUNTER — OFFICE VISIT (OUTPATIENT)
Age: 44
End: 2024-11-19
Payer: COMMERCIAL

## 2024-11-19 VITALS
OXYGEN SATURATION: 99 % | SYSTOLIC BLOOD PRESSURE: 110 MMHG | RESPIRATION RATE: 20 BRPM | HEART RATE: 60 BPM | BODY MASS INDEX: 50.02 KG/M2 | WEIGHT: 293 LBS | HEIGHT: 64 IN

## 2024-11-19 DIAGNOSIS — J45.20 MILD INTERMITTENT ASTHMA WITHOUT COMPLICATION: ICD-10-CM

## 2024-11-19 DIAGNOSIS — E66.01 MORBID OBESITY: Primary | ICD-10-CM

## 2024-11-19 DIAGNOSIS — K21.9 GASTROESOPHAGEAL REFLUX DISEASE WITHOUT ESOPHAGITIS: ICD-10-CM

## 2024-11-19 DIAGNOSIS — K44.9 HIATAL HERNIA: ICD-10-CM

## 2024-11-19 DIAGNOSIS — G47.33 OBSTRUCTIVE SLEEP APNEA: ICD-10-CM

## 2024-11-19 PROCEDURE — 99213 OFFICE O/P EST LOW 20 MIN: CPT | Performed by: SURGERY

## 2024-11-19 RX ORDER — LATANOPROST 50 UG/ML
1 SOLUTION/ DROPS OPHTHALMIC NIGHTLY
COMMUNITY

## 2024-11-19 ASSESSMENT — PATIENT HEALTH QUESTIONNAIRE - PHQ9
SUM OF ALL RESPONSES TO PHQ QUESTIONS 1-9: 0
SUM OF ALL RESPONSES TO PHQ QUESTIONS 1-9: 0
1. LITTLE INTEREST OR PLEASURE IN DOING THINGS: NOT AT ALL
SUM OF ALL RESPONSES TO PHQ QUESTIONS 1-9: 0
2. FEELING DOWN, DEPRESSED OR HOPELESS: NOT AT ALL
SUM OF ALL RESPONSES TO PHQ9 QUESTIONS 1 & 2: 0
SUM OF ALL RESPONSES TO PHQ QUESTIONS 1-9: 0

## 2024-11-19 NOTE — PROGRESS NOTES
Subjective:     The patient is a 44 y.o. obese female seeking approval for laparoscopic hiatal hernia repair with revision of sleeve gastrectomy to gastric bypass.  Body mass index is 53.21 kg/m².   Maggie Lin has tried multiple diets in her  lifetime most recently trying unsupervised diets during which she was able to lose small amounts of weight and then gained it back plus more.  Reflux symptoms persist.  Her exercise has been limited by left ankle pain.    Bariatric comorbidities present are   Past Medical History:   Diagnosis Date    Anemia     Arthritis     Asthma     [facility]    Cerebral artery occlusion with cerebral infarction (HCC) 2016    TIA, NO RESIDUAL    COPD (chronic obstructive pulmonary disease) (HCC)     Lung disease     Morbid obesity     Rash 2019    It happens when working out.    Sleep apnea     DOES NOT WEAR HER CPAP       Patient Active Problem List    Diagnosis Date Noted    Hiatal hernia 06/03/2024    S/P myomectomy 12/21/2023    Class 3 severe obesity due to excess calories with serious comorbidity in adult 09/15/2023    Severe anemia 09/02/2023    Mild intermittent asthma 04/11/2023    Gastroesophageal reflux disease without esophagitis 04/11/2023    Allergic rhinitis 04/11/2023    Liver mass 06/21/2022    Mixed hyperlipidemia 10/19/2021    Iron deficiency anemia 08/12/2020    Osteoarthrosis, localized, primary, knee 09/20/2017    Obstructive sleep apnea 02/15/2012    Arthritis 02/15/2012    Asthma 09/08/2011    COPD (chronic obstructive pulmonary disease) (HCC) 09/08/2011     Past Medical History:   Diagnosis Date    Anemia     Arthritis     Asthma     [facility]    Cerebral artery occlusion with cerebral infarction (HCC) 2016    TIA, NO RESIDUAL    COPD (chronic obstructive pulmonary disease) (HCC)     Lung disease     Morbid obesity     Rash 2019    It happens when working out.    Sleep apnea     DOES NOT WEAR HER CPAP      Past Surgical History:   Procedure

## 2024-11-19 NOTE — PROGRESS NOTES
Identified patient with two patient identifiers (name and ). Reviewed chart in preparation for visit and have obtained necessary documentation.    Maggie Lin is a 44 y.o. female  Chief Complaint   Patient presents with    Weight Management     Final review     BP (!) 110/0 (Site: Left Lower Arm, Position: Sitting, Cuff Size: Large Adult)   Pulse 60   Resp 20   Ht 1.626 m (5' 4\")   Wt (!) 140.6 kg (310 lb)   SpO2 99%   BMI 53.21 kg/m²     1. Have you been to the ER, urgent care clinic since your last visit?  Hospitalized since your last visit?ED for blurred vision    2. Have you seen or consulted any other health care providers outside of the [facility] since your last visit?  Include any pap smears or colon screening. Yes [facility]

## 2024-11-26 ENCOUNTER — HOSPITAL ENCOUNTER (OUTPATIENT)
Facility: HOSPITAL | Age: 44
Setting detail: INFUSION SERIES
End: 2024-11-26

## 2024-12-03 ENCOUNTER — HOSPITAL ENCOUNTER (OUTPATIENT)
Facility: HOSPITAL | Age: 44
Setting detail: INFUSION SERIES
Discharge: HOME | End: 2024-12-03
Payer: COMMERCIAL

## 2024-12-03 VITALS
RESPIRATION RATE: 18 BRPM | WEIGHT: 293 LBS | BODY MASS INDEX: 50.02 KG/M2 | HEIGHT: 64 IN | HEART RATE: 87 BPM | DIASTOLIC BLOOD PRESSURE: 89 MMHG | SYSTOLIC BLOOD PRESSURE: 136 MMHG | TEMPERATURE: 98.2 F

## 2024-12-03 DIAGNOSIS — D50.8 IRON DEFICIENCY ANEMIA SECONDARY TO INADEQUATE DIETARY IRON INTAKE: Primary | ICD-10-CM

## 2024-12-03 PROCEDURE — 96374 THER/PROPH/DIAG INJ IV PUSH: CPT

## 2024-12-03 PROCEDURE — 6360000002 HC RX W HCPCS: Performed by: NURSE PRACTITIONER

## 2024-12-03 RX ORDER — SODIUM CHLORIDE 0.9 % (FLUSH) 0.9 %
5-40 SYRINGE (ML) INJECTION PRN
OUTPATIENT
Start: 2024-12-10

## 2024-12-03 RX ORDER — SODIUM CHLORIDE 9 MG/ML
5-250 INJECTION, SOLUTION INTRAVENOUS PRN
Status: CANCELLED | OUTPATIENT
Start: 2024-12-10

## 2024-12-03 RX ORDER — ALBUTEROL SULFATE 90 UG/1
4 INHALANT RESPIRATORY (INHALATION) PRN
OUTPATIENT
Start: 2024-12-10

## 2024-12-03 RX ORDER — DIPHENHYDRAMINE HYDROCHLORIDE 50 MG/ML
50 INJECTION INTRAMUSCULAR; INTRAVENOUS
OUTPATIENT
Start: 2024-12-10

## 2024-12-03 RX ORDER — HEPARIN 100 UNIT/ML
500 SYRINGE INTRAVENOUS PRN
OUTPATIENT
Start: 2024-12-10

## 2024-12-03 RX ORDER — SODIUM CHLORIDE 9 MG/ML
5-250 INJECTION, SOLUTION INTRAVENOUS PRN
OUTPATIENT
Start: 2024-12-10

## 2024-12-03 RX ORDER — EPINEPHRINE 1 MG/ML
0.3 INJECTION, SOLUTION INTRAMUSCULAR; SUBCUTANEOUS PRN
OUTPATIENT
Start: 2024-12-10

## 2024-12-03 RX ORDER — DORZOLAMIDE HCL 20 MG/ML
1 SOLUTION/ DROPS OPHTHALMIC 2 TIMES DAILY
COMMUNITY
Start: 2024-11-05 | End: 2025-11-05

## 2024-12-03 RX ORDER — SODIUM CHLORIDE 9 MG/ML
INJECTION, SOLUTION INTRAVENOUS CONTINUOUS
OUTPATIENT
Start: 2024-12-10

## 2024-12-03 RX ORDER — ONDANSETRON 2 MG/ML
8 INJECTION INTRAMUSCULAR; INTRAVENOUS
OUTPATIENT
Start: 2024-12-10

## 2024-12-03 RX ORDER — ACETAMINOPHEN 325 MG/1
650 TABLET ORAL
OUTPATIENT
Start: 2024-12-10

## 2024-12-03 RX ORDER — HYDROCORTISONE SODIUM SUCCINATE 100 MG/2ML
100 INJECTION INTRAMUSCULAR; INTRAVENOUS
OUTPATIENT
Start: 2024-12-10

## 2024-12-03 RX ADMIN — IRON SUCROSE 200 MG: 20 INJECTION, SOLUTION INTRAVENOUS at 12:05

## 2024-12-03 NOTE — PROGRESS NOTES
Name: Maggie Lin    MRN: [id number]         : 1980    Ms. Lin Arrived ambulatory and in no distress for Venofer Dose 1 of 5.  Assessment was completed, no acute issues at this time, no new complaints voiced.  24 G PIV established to right arm without difficulty. Discussed Venofer, purpose, administration, possibility of reaction, and possible side effects. Pt verbalizes understanding.       Ms. Lin's vitals were reviewed.  Patient Vitals for the past 24 hrs:   BP Temp Temp src Pulse Resp Height Weight   24 1239 136/89 -- -- 87 18 -- --   24 1155 (!) 145/94 98.2 °F (36.8 °C) Temporal 79 18 -- --   24 1148 -- -- -- -- -- 1.626 m (5' 4.02\") (!) 138.8 kg (305 lb 14.4 oz)     Medications:  Medications Administered         iron sucrose (VENOFER) injection 200 mg Admin Date  2024 Action  Given Dose  200 mg Route  IntraVENous Documented By  Meghan Cota, RN          Ms. Lin tolerated treatment well and [facility] .   PIV flushed & removed.  Discharge instructions reviewed with patient, verbalized understanding.  Patient observed for 30 minutes post infusion, no s/s of reaction.  She is to return on 12/10/2024 for her next appointment.    MEGHAN COTA RN  December 3, 2024

## 2024-12-10 ENCOUNTER — TELEPHONE (OUTPATIENT)
Age: 44
End: 2024-12-10

## 2024-12-10 ENCOUNTER — HOSPITAL ENCOUNTER (OUTPATIENT)
Facility: HOSPITAL | Age: 44
Setting detail: INFUSION SERIES
Discharge: HOME | End: 2024-12-10
Payer: COMMERCIAL

## 2024-12-10 VITALS
SYSTOLIC BLOOD PRESSURE: 102 MMHG | RESPIRATION RATE: 16 BRPM | DIASTOLIC BLOOD PRESSURE: 75 MMHG | OXYGEN SATURATION: 99 % | TEMPERATURE: 98 F | HEART RATE: 76 BPM

## 2024-12-10 DIAGNOSIS — D50.8 IRON DEFICIENCY ANEMIA SECONDARY TO INADEQUATE DIETARY IRON INTAKE: Primary | ICD-10-CM

## 2024-12-10 PROCEDURE — 6360000002 HC RX W HCPCS: Performed by: NURSE PRACTITIONER

## 2024-12-10 PROCEDURE — 96375 TX/PRO/DX INJ NEW DRUG ADDON: CPT

## 2024-12-10 PROCEDURE — 6370000000 HC RX 637 (ALT 250 FOR IP): Performed by: NURSE PRACTITIONER

## 2024-12-10 PROCEDURE — 96374 THER/PROPH/DIAG INJ IV PUSH: CPT

## 2024-12-10 RX ORDER — ACETAMINOPHEN 325 MG/1
650 TABLET ORAL ONCE
Status: CANCELLED
Start: 2024-12-17 | End: 2024-12-17

## 2024-12-10 RX ORDER — SODIUM CHLORIDE 0.9 % (FLUSH) 0.9 %
5-40 SYRINGE (ML) INJECTION PRN
Status: CANCELLED | OUTPATIENT
Start: 2024-12-17

## 2024-12-10 RX ORDER — EPINEPHRINE 1 MG/ML
0.3 INJECTION, SOLUTION INTRAMUSCULAR; SUBCUTANEOUS PRN
OUTPATIENT
Start: 2024-12-17

## 2024-12-10 RX ORDER — SODIUM CHLORIDE 9 MG/ML
INJECTION, SOLUTION INTRAVENOUS CONTINUOUS
OUTPATIENT
Start: 2024-12-17

## 2024-12-10 RX ORDER — ALBUTEROL SULFATE 90 UG/1
4 INHALANT RESPIRATORY (INHALATION) PRN
OUTPATIENT
Start: 2024-12-17

## 2024-12-10 RX ORDER — DIPHENHYDRAMINE HYDROCHLORIDE 50 MG/ML
50 INJECTION INTRAMUSCULAR; INTRAVENOUS
OUTPATIENT
Start: 2024-12-17

## 2024-12-10 RX ORDER — ONDANSETRON 2 MG/ML
8 INJECTION INTRAMUSCULAR; INTRAVENOUS
OUTPATIENT
Start: 2024-12-17

## 2024-12-10 RX ORDER — ACETAMINOPHEN 325 MG/1
650 TABLET ORAL ONCE
Status: COMPLETED | OUTPATIENT
Start: 2024-12-10 | End: 2024-12-10

## 2024-12-10 RX ORDER — HYDROCORTISONE SODIUM SUCCINATE 100 MG/2ML
100 INJECTION INTRAMUSCULAR; INTRAVENOUS
OUTPATIENT
Start: 2024-12-17

## 2024-12-10 RX ORDER — ACETAMINOPHEN 325 MG/1
650 TABLET ORAL ONCE
Status: CANCELLED
Start: 2024-12-10 | End: 2024-12-10

## 2024-12-10 RX ORDER — SODIUM CHLORIDE 9 MG/ML
5-250 INJECTION, SOLUTION INTRAVENOUS PRN
Status: CANCELLED | OUTPATIENT
Start: 2024-12-17

## 2024-12-10 RX ORDER — HEPARIN 100 UNIT/ML
500 SYRINGE INTRAVENOUS PRN
OUTPATIENT
Start: 2024-12-17

## 2024-12-10 RX ORDER — SODIUM CHLORIDE 9 MG/ML
5-250 INJECTION, SOLUTION INTRAVENOUS PRN
Status: DISCONTINUED | OUTPATIENT
Start: 2024-12-10 | End: 2024-12-11 | Stop reason: HOSPADM

## 2024-12-10 RX ORDER — SODIUM CHLORIDE 9 MG/ML
5-250 INJECTION, SOLUTION INTRAVENOUS PRN
OUTPATIENT
Start: 2024-12-17

## 2024-12-10 RX ORDER — ACETAMINOPHEN 325 MG/1
650 TABLET ORAL
OUTPATIENT
Start: 2024-12-17

## 2024-12-10 RX ADMIN — METHYLPREDNISOLONE SODIUM SUCCINATE 40 MG: 40 INJECTION, POWDER, FOR SOLUTION INTRAMUSCULAR; INTRAVENOUS at 11:50

## 2024-12-10 RX ADMIN — ACETAMINOPHEN 650 MG: 325 TABLET ORAL at 11:48

## 2024-12-10 RX ADMIN — IRON SUCROSE 200 MG: 20 INJECTION, SOLUTION INTRAVENOUS at 11:59

## 2024-12-10 NOTE — PROGRESS NOTES
[facility] Progress Note    Pt arrived in stable condition for Venofer dose 2/5    Assessment unchanged from last week except pt experienced body aches and had fever after last weeks Venofer treatment. Pt notified MD office and premeds ordered.    24G PIV established in left A/C by BRYSON Tony. Positive blood return noted.     Patient Vitals for the past 12 hrs:   Temp Pulse Resp BP SpO2   12/10/24 1245 -- 76 16 102/75 --   12/10/24 1130 98 °F (36.7 °C) 60 16 126/75 99 %        The following medications administered:  Medications Administered         acetaminophen (TYLENOL) tablet 650 mg Admin Date  12/10/2024 Action  Given Dose  650 mg Route  Oral Documented By  Lesia Atwood RN        iron sucrose (VENOFER) injection 200 mg Admin Date  12/10/2024 Action  Given Dose  200 mg Route  IntraVENous Documented By  Lesia Atwood RN        methylPREDNISolone sodium (PF) (SOLU-MEDROL PF) injection 40 mg Admin Date  12/10/2024 Action  Given Dose  40 mg Route  IntraVENous Documented By  Lesia Atwood, BRYSON          Pt monitored x 30 mins after treatment.  Pt tolerated treatment well. IV flushed per policy and removed, 2x2 and coban placed.     Pt discharged in no acute distress. Next appointment:    Future Appointments   Date Time Provider Department Center   12/17/2024 11:30 AM [facility] CHAIR 3 [facility]   12/24/2024 11:30 AM [facility] MED INF CHAIR 3 [facility]   12/31/2024 11:30 AM [facility] MED INF CHAIR 3 [facility]   1/6/2025 10:20 AM Chidi Cruz MD [facility] BS AMB   1/9/2025  9:20 AM Kandice Juarez MD [facility] BS AMB   1/28/2025 10:00 AM Delmy Owusu, APRN - CNP [facility] BS AMB

## 2024-12-17 ENCOUNTER — HOSPITAL ENCOUNTER (OUTPATIENT)
Facility: HOSPITAL | Age: 44
Setting detail: INFUSION SERIES
Discharge: HOME | End: 2024-12-17
Payer: COMMERCIAL

## 2024-12-17 VITALS
SYSTOLIC BLOOD PRESSURE: 112 MMHG | DIASTOLIC BLOOD PRESSURE: 79 MMHG | BODY MASS INDEX: 50.02 KG/M2 | WEIGHT: 293 LBS | TEMPERATURE: 98.2 F | OXYGEN SATURATION: 100 % | HEIGHT: 64 IN | HEART RATE: 69 BPM | RESPIRATION RATE: 16 BRPM

## 2024-12-17 DIAGNOSIS — D50.8 IRON DEFICIENCY ANEMIA SECONDARY TO INADEQUATE DIETARY IRON INTAKE: Primary | ICD-10-CM

## 2024-12-17 PROCEDURE — 96374 THER/PROPH/DIAG INJ IV PUSH: CPT

## 2024-12-17 PROCEDURE — 6370000000 HC RX 637 (ALT 250 FOR IP): Performed by: NURSE PRACTITIONER

## 2024-12-17 PROCEDURE — 6360000002 HC RX W HCPCS: Performed by: NURSE PRACTITIONER

## 2024-12-17 PROCEDURE — 96375 TX/PRO/DX INJ NEW DRUG ADDON: CPT

## 2024-12-17 PROCEDURE — 2580000003 HC RX 258: Performed by: NURSE PRACTITIONER

## 2024-12-17 RX ORDER — SODIUM CHLORIDE 9 MG/ML
INJECTION, SOLUTION INTRAVENOUS CONTINUOUS
OUTPATIENT
Start: 2024-12-24

## 2024-12-17 RX ORDER — ACETAMINOPHEN 325 MG/1
650 TABLET ORAL
OUTPATIENT
Start: 2024-12-24

## 2024-12-17 RX ORDER — ALBUTEROL SULFATE 90 UG/1
4 INHALANT RESPIRATORY (INHALATION) PRN
OUTPATIENT
Start: 2024-12-24

## 2024-12-17 RX ORDER — SODIUM CHLORIDE 9 MG/ML
5-250 INJECTION, SOLUTION INTRAVENOUS PRN
OUTPATIENT
Start: 2024-12-24

## 2024-12-17 RX ORDER — ONDANSETRON 2 MG/ML
8 INJECTION INTRAMUSCULAR; INTRAVENOUS
OUTPATIENT
Start: 2024-12-24

## 2024-12-17 RX ORDER — EPINEPHRINE 1 MG/ML
0.3 INJECTION, SOLUTION INTRAMUSCULAR; SUBCUTANEOUS PRN
OUTPATIENT
Start: 2024-12-24

## 2024-12-17 RX ORDER — ACETAMINOPHEN 325 MG/1
650 TABLET ORAL ONCE
Start: 2024-12-24 | End: 2024-12-24

## 2024-12-17 RX ORDER — HEPARIN 100 UNIT/ML
500 SYRINGE INTRAVENOUS PRN
OUTPATIENT
Start: 2024-12-24

## 2024-12-17 RX ORDER — SODIUM CHLORIDE 0.9 % (FLUSH) 0.9 %
5-40 SYRINGE (ML) INJECTION PRN
Status: DISCONTINUED | OUTPATIENT
Start: 2024-12-17 | End: 2024-12-18 | Stop reason: HOSPADM

## 2024-12-17 RX ORDER — SODIUM CHLORIDE 0.9 % (FLUSH) 0.9 %
5-40 SYRINGE (ML) INJECTION PRN
OUTPATIENT
Start: 2024-12-24

## 2024-12-17 RX ORDER — HYDROCORTISONE SODIUM SUCCINATE 100 MG/2ML
100 INJECTION INTRAMUSCULAR; INTRAVENOUS
OUTPATIENT
Start: 2024-12-24

## 2024-12-17 RX ORDER — ACETAMINOPHEN 325 MG/1
650 TABLET ORAL ONCE
Status: COMPLETED | OUTPATIENT
Start: 2024-12-17 | End: 2024-12-17

## 2024-12-17 RX ORDER — SODIUM CHLORIDE 9 MG/ML
5-250 INJECTION, SOLUTION INTRAVENOUS PRN
Status: DISCONTINUED | OUTPATIENT
Start: 2024-12-17 | End: 2024-12-18 | Stop reason: HOSPADM

## 2024-12-17 RX ORDER — DIPHENHYDRAMINE HYDROCHLORIDE 50 MG/ML
50 INJECTION INTRAMUSCULAR; INTRAVENOUS
OUTPATIENT
Start: 2024-12-24

## 2024-12-17 RX ADMIN — METHYLPREDNISOLONE SODIUM SUCCINATE 40 MG: 40 INJECTION, POWDER, FOR SOLUTION INTRAMUSCULAR; INTRAVENOUS at 11:57

## 2024-12-17 RX ADMIN — IRON SUCROSE 200 MG: 20 INJECTION, SOLUTION INTRAVENOUS at 12:13

## 2024-12-17 RX ADMIN — ACETAMINOPHEN 650 MG: 325 TABLET ORAL at 11:57

## 2024-12-17 RX ADMIN — SODIUM CHLORIDE, PRESERVATIVE FREE 10 ML: 5 INJECTION INTRAVENOUS at 11:59

## 2024-12-17 NOTE — PROGRESS NOTES
[facility]fusion [facility] Progress Note    1115- Pt admit to [facility] for Venofer 3/5 in stable condition. Assessment completed, reports no fever/body aches after 2nd dose of Venofer with pre-medications.    24G PIV established in right AC with positive blood return noted after 3 attempts by 2 RNs.    Patient Vitals for the past 12 hrs:   Temp Pulse Resp BP SpO2   12/17/24 1226 -- 69 -- 112/79 100 %   12/17/24 1114 98.2 °F (36.8 °C) 75 16 (!) 141/79 95 %     The following medications administered:  Medications Administered         acetaminophen (TYLENOL) tablet 650 mg Admin Date  12/17/2024 Action  Given Dose  650 mg Route  Oral Documented By  Bruce Rashid RN        iron sucrose (VENOFER) injection 200 mg Admin Date  12/17/2024 Action  Given Dose  200 mg Route  IntraVENous Documented By  Bruce Rashid, BRYSON        methylPREDNISolone sodium (PF) (SOLU-MEDROL PF) injection 40 mg Admin Date  12/17/2024 Action  Given Dose  40 mg Route  IntraVENous Documented By  Bruce Rashid, RN        sodium chloride flush 0.9 % injection 5-40 mL Admin Date  12/17/2024 Action  Given Dose  10 mL Route  IntraVENous Documented By  Bruce Rashid, BRYSON          Pt politely declined to be monitored 30 mins post infusion. Pt tolerated well, no s/s of adverse reaction noted. PIV flushed and discontinued. Site wrapped in gauze and coban.    Pt provided with education on possible side effects of medication along with discharge instructions. Pt verbalized understanding.      1235- D/C home in no distress.     Pt aware of next appointment scheduled for:   Future Appointments   Date Time Provider Department Center   12/24/2024 11:30 AM [facility] MED INF CHAIR 3 [facility]   12/31/2024 11:30 AM [facility] MED INF CHAIR 3 [facility]   1/6/2025 10:20 AM Chidi Cruz MD [facility] BS AMB   1/9/2025  9:20 AM Kandice Juarez MD [facility] BS AMB   1/28/2025 10:00 AM Delmy Owusu, APRN - CNP [facility] BS AMB

## 2024-12-24 ENCOUNTER — HOSPITAL ENCOUNTER (OUTPATIENT)
Facility: HOSPITAL | Age: 44
Setting detail: INFUSION SERIES
End: 2024-12-24

## 2024-12-31 ENCOUNTER — TELEPHONE (OUTPATIENT)
Age: 44
End: 2024-12-31

## 2024-12-31 ENCOUNTER — HOSPITAL ENCOUNTER (OUTPATIENT)
Facility: HOSPITAL | Age: 44
Setting detail: INFUSION SERIES
Discharge: HOME | End: 2024-12-31
Payer: COMMERCIAL

## 2024-12-31 VITALS
DIASTOLIC BLOOD PRESSURE: 81 MMHG | SYSTOLIC BLOOD PRESSURE: 117 MMHG | OXYGEN SATURATION: 100 % | TEMPERATURE: 97.7 F | RESPIRATION RATE: 16 BRPM | HEART RATE: 87 BPM

## 2024-12-31 DIAGNOSIS — D50.8 IRON DEFICIENCY ANEMIA SECONDARY TO INADEQUATE DIETARY IRON INTAKE: Primary | ICD-10-CM

## 2024-12-31 PROCEDURE — 96375 TX/PRO/DX INJ NEW DRUG ADDON: CPT

## 2024-12-31 PROCEDURE — 6360000002 HC RX W HCPCS: Performed by: NURSE PRACTITIONER

## 2024-12-31 PROCEDURE — 6370000000 HC RX 637 (ALT 250 FOR IP): Performed by: NURSE PRACTITIONER

## 2024-12-31 PROCEDURE — 96374 THER/PROPH/DIAG INJ IV PUSH: CPT

## 2024-12-31 RX ORDER — SODIUM CHLORIDE 0.9 % (FLUSH) 0.9 %
5-40 SYRINGE (ML) INJECTION PRN
OUTPATIENT
Start: 2025-01-07

## 2024-12-31 RX ORDER — HEPARIN 100 UNIT/ML
500 SYRINGE INTRAVENOUS PRN
OUTPATIENT
Start: 2025-01-07

## 2024-12-31 RX ORDER — ALBUTEROL SULFATE 90 UG/1
4 INHALANT RESPIRATORY (INHALATION) PRN
OUTPATIENT
Start: 2025-01-07

## 2024-12-31 RX ORDER — SODIUM CHLORIDE 9 MG/ML
5-250 INJECTION, SOLUTION INTRAVENOUS PRN
OUTPATIENT
Start: 2025-01-07

## 2024-12-31 RX ORDER — EPINEPHRINE 1 MG/ML
0.3 INJECTION, SOLUTION INTRAMUSCULAR; SUBCUTANEOUS PRN
OUTPATIENT
Start: 2025-01-07

## 2024-12-31 RX ORDER — ACETAMINOPHEN 325 MG/1
650 TABLET ORAL ONCE
Start: 2025-01-07 | End: 2025-01-07

## 2024-12-31 RX ORDER — HYDROCORTISONE SODIUM SUCCINATE 100 MG/2ML
100 INJECTION INTRAMUSCULAR; INTRAVENOUS
OUTPATIENT
Start: 2025-01-07

## 2024-12-31 RX ORDER — SODIUM CHLORIDE 9 MG/ML
INJECTION, SOLUTION INTRAVENOUS CONTINUOUS
OUTPATIENT
Start: 2025-01-07

## 2024-12-31 RX ORDER — SODIUM CHLORIDE 9 MG/ML
5-250 INJECTION, SOLUTION INTRAVENOUS PRN
Status: DISCONTINUED | OUTPATIENT
Start: 2024-12-31 | End: 2025-01-01 | Stop reason: HOSPADM

## 2024-12-31 RX ORDER — DIPHENHYDRAMINE HYDROCHLORIDE 50 MG/ML
50 INJECTION INTRAMUSCULAR; INTRAVENOUS
OUTPATIENT
Start: 2025-01-07

## 2024-12-31 RX ORDER — ACETAMINOPHEN 325 MG/1
650 TABLET ORAL
OUTPATIENT
Start: 2025-01-07

## 2024-12-31 RX ORDER — ACETAMINOPHEN 325 MG/1
650 TABLET ORAL ONCE
Status: COMPLETED | OUTPATIENT
Start: 2024-12-31 | End: 2024-12-31

## 2024-12-31 RX ORDER — ONDANSETRON 2 MG/ML
8 INJECTION INTRAMUSCULAR; INTRAVENOUS
OUTPATIENT
Start: 2025-01-07

## 2024-12-31 RX ADMIN — ACETAMINOPHEN 650 MG: 325 TABLET ORAL at 11:38

## 2024-12-31 RX ADMIN — METHYLPREDNISOLONE SODIUM SUCCINATE 40 MG: 40 INJECTION, POWDER, FOR SOLUTION INTRAMUSCULAR; INTRAVENOUS at 11:48

## 2024-12-31 RX ADMIN — IRON SUCROSE 200 MG: 20 INJECTION, SOLUTION INTRAVENOUS at 11:51

## 2024-12-31 NOTE — PROGRESS NOTES
[facility]patient [facility]fusion [facility] Progress Note    Pt arrived in stable condition for Venofer 4/5    Assessment unchanged. 24G PIV established in right A/C by BRYSON Christianson with positive blood return noted.     Patient Vitals for the past 12 hrs:   Temp Pulse Resp BP SpO2   12/31/24 1130 97.7 °F (36.5 °C) 70 16 107/75 100 %      The following medications administered:  Medications Administered         acetaminophen (TYLENOL) tablet 650 mg Admin Date  12/31/2024 Action  Given Dose  650 mg Route  Oral Documented By  Lesia Atwood RN        iron sucrose (VENOFER) injection 200 mg Admin Date  12/31/2024 Action  Given Dose  200 mg Route  IntraVENous Documented By  Lesia Atwood RN        methylPREDNISolone sodium (PF) (SOLU-MEDROL PF) injection 40 mg Admin Date  12/31/2024 Action  Given Dose  40 mg Route  IntraVENous Documented By  Lesia Atwood, BRYSON          Pt politely declined to stay for monitoring. Pt tolerated treatment well. IV flushed per policy and removed, 2x2 and coban placed.     Pt discharged in no acute distress. Next appointment:    Future Appointments   Date Time Provider Department Center   1/6/2025 10:20 AM Chidi Cruz MD [facility] [place] BS AMB   1/7/2025  1:30 PM [facility] INF CHAIR 1 [facility]   1/9/2025  9:20 AM Kandice Juarez MD [facility] BS AMB   1/28/2025 10:00 AM Delmy Owusu, APRN - CNP [facility] BS AMB

## 2025-01-07 ENCOUNTER — HOSPITAL ENCOUNTER (OUTPATIENT)
Facility: HOSPITAL | Age: 45
Setting detail: INFUSION SERIES
End: 2025-01-07

## 2025-01-08 ENCOUNTER — TELEPHONE (OUTPATIENT)
Age: 45
End: 2025-01-08

## 2025-01-08 NOTE — TELEPHONE ENCOUNTER
[facility] schedule request received from patient requesting appointment cancellation. Called and LVM requesting that patient call [facility] to confirm if she would like to cancel appointment (2nd Attempt)

## 2025-01-08 NOTE — TELEPHONE ENCOUNTER
[facility]. Called and LVM requesting that patient call [facility] to confirm if she would like to cancel appointment.

## 2025-01-13 ENCOUNTER — CLINICAL DOCUMENTATION (OUTPATIENT)
Age: 45
End: 2025-01-13

## 2025-01-13 ENCOUNTER — OFFICE VISIT (OUTPATIENT)
Age: 45
End: 2025-01-13
Payer: COMMERCIAL

## 2025-01-13 VITALS
HEART RATE: 58 BPM | SYSTOLIC BLOOD PRESSURE: 123 MMHG | DIASTOLIC BLOOD PRESSURE: 68 MMHG | OXYGEN SATURATION: 98 % | BODY MASS INDEX: 50.02 KG/M2 | HEIGHT: 64 IN | WEIGHT: 293 LBS | TEMPERATURE: 97.9 F | RESPIRATION RATE: 16 BRPM

## 2025-01-13 DIAGNOSIS — K21.9 GASTROESOPHAGEAL REFLUX DISEASE WITHOUT ESOPHAGITIS: ICD-10-CM

## 2025-01-13 DIAGNOSIS — D64.9 SEVERE ANEMIA: ICD-10-CM

## 2025-01-13 DIAGNOSIS — J45.20 MILD INTERMITTENT ASTHMA WITHOUT COMPLICATION: ICD-10-CM

## 2025-01-13 DIAGNOSIS — K44.9 HIATAL HERNIA: ICD-10-CM

## 2025-01-13 DIAGNOSIS — E66.01 MORBID OBESITY: Primary | ICD-10-CM

## 2025-01-13 PROCEDURE — 99213 OFFICE O/P EST LOW 20 MIN: CPT | Performed by: SURGERY

## 2025-01-13 RX ORDER — PREDNISONE 20 MG/1
20 TABLET ORAL DAILY
COMMUNITY

## 2025-01-13 ASSESSMENT — PATIENT HEALTH QUESTIONNAIRE - PHQ9
SUM OF ALL RESPONSES TO PHQ9 QUESTIONS 1 & 2: 0
SUM OF ALL RESPONSES TO PHQ QUESTIONS 1-9: 0
2. FEELING DOWN, DEPRESSED OR HOPELESS: NOT AT ALL
SUM OF ALL RESPONSES TO PHQ QUESTIONS 1-9: 0
1. LITTLE INTEREST OR PLEASURE IN DOING THINGS: NOT AT ALL

## 2025-01-13 NOTE — PROGRESS NOTES
Identified patient with two patient identifiers (name and ). Reviewed chart in preparation for visit and have obtained necessary documentation.    Maggie Lin is a 44 y.o. female  Chief Complaint   Patient presents with    Follow-up     Weight check     /68 (Site: Left Lower Arm, Position: Sitting, Cuff Size: Large Adult)   Pulse 58   Temp 97.9 °F (36.6 °C)   Resp 16   Ht 1.626 m (5' 4\")   Wt (!) 138.3 kg (305 lb)   SpO2 98%   BMI 52.35 kg/m²     1. Have you been to the ER, urgent care clinic since your last visit?  Hospitalized since your last visit?no    2. Have you seen or consulted any other health care providers outside of the [facility] since your last visit?  Include any pap smears or colon screening. no

## 2025-01-13 NOTE — PROGRESS NOTES
Submitted Authorization to [employer] via [id number] for ROBOTIC ASSISTED LAPAROSCOPIC HIATAL HERNIA REPAIR WITH REVISION OF SLEEVE GASTRECTOMY TO GASTRIC BYPASS with Dr. HAYNES

## 2025-01-13 NOTE — PROGRESS NOTES
Subjective:     The patient is a 44 y.o. obese female seeking approval for revisional bariatric surgery, hiatal hernia repair.  She has a history of 2016 sleeve gastrectomy, with suboptimal weight loss results, and development of progressive, poorly controlled reflux symptoms despite escalation of her medical regimen.  Body mass index is 52.35 kg/m².   Maggie Lin has tried multiple diets in her  lifetime most recently trying unsupervised diets during which she was able to lose small amounts of weight.     Bariatric comorbidities present are   Past Medical History:   Diagnosis Date    Anemia     Arthritis     Asthma     [facility]    Cerebral artery occlusion with cerebral infarction (HCC) 2016    TIA, NO RESIDUAL    COPD (chronic obstructive pulmonary disease) (HCC)     Lung disease     Morbid obesity     Rash 2019    It happens when working out.    Sleep apnea     DOES NOT WEAR HER CPAP       Patient Active Problem List    Diagnosis Date Noted    Hiatal hernia 06/03/2024    S/P myomectomy 12/21/2023    Class 3 severe obesity due to excess calories with serious comorbidity in adult 09/15/2023    Severe anemia 09/02/2023    Mild intermittent asthma 04/11/2023    Gastroesophageal reflux disease without esophagitis 04/11/2023    Allergic rhinitis 04/11/2023    Liver mass 06/21/2022    Mixed hyperlipidemia 10/19/2021    Iron deficiency anemia 08/12/2020    Osteoarthrosis, localized, primary, knee 09/20/2017    Obstructive sleep apnea 02/15/2012    Arthritis 02/15/2012    Asthma 09/08/2011    COPD (chronic obstructive pulmonary disease) (HCC) 09/08/2011     Past Medical History:   Diagnosis Date    Anemia     Arthritis     Asthma     [facility]    Cerebral artery occlusion with cerebral infarction (HCC) 2016    TIA, NO RESIDUAL    COPD (chronic obstructive pulmonary disease) (HCC)     Lung disease     Morbid obesity     Rash 2019    It happens when working out.    Sleep apnea     DOES NOT WEAR HER

## 2025-01-14 ENCOUNTER — TELEPHONE (OUTPATIENT)
Age: 45
End: 2025-01-14

## 2025-01-14 NOTE — TELEPHONE ENCOUNTER
Called Billy to confirm that authorization I faxed over was received.  Automated system confirmed it was received and under medical review.  Pending auth# [id number]

## 2025-01-29 ENCOUNTER — TELEPHONE (OUTPATIENT)
Age: 45
End: 2025-01-29

## 2025-01-29 NOTE — TELEPHONE ENCOUNTER
Retuning patients call about scheduling surgery.  Still waiting on getting a response back form [employer] which I should get sometime next week.    Spoke to patient and she states she did receive a notification letter from her insurance.  I gave her my email for her to send it to me as an attachment so I can get her scheduled.

## 2025-01-30 ENCOUNTER — TELEPHONE (OUTPATIENT)
Age: 45
End: 2025-01-30

## 2025-01-30 ENCOUNTER — PREP FOR PROCEDURE (OUTPATIENT)
Age: 45
End: 2025-01-30

## 2025-01-30 DIAGNOSIS — E66.01 MORBID OBESITY: ICD-10-CM

## 2025-01-30 PROBLEM — J45.20 INTRINSIC ASTHMA WITHOUT STATUS ASTHMATICUS: Status: ACTIVE | Noted: 2025-01-30

## 2025-01-30 PROBLEM — D64.9 ANEMIA, UNSPECIFIED: Status: ACTIVE | Noted: 2025-01-30

## 2025-01-30 PROBLEM — K21.9 ESOPHAGEAL REFLUX: Status: ACTIVE | Noted: 2025-01-30

## 2025-01-30 NOTE — PERIOP NOTE
PAT: 2-10 2 9;30 SCHEDULED BY [facility] THROUGH [facility]     PAT  Received: Today  Binu Li [place]; Alvaro Atwood  Patients surgery is scheduled for 3/12.  Needing PAT scheduled the week of 2/10

## 2025-01-30 NOTE — TELEPHONE ENCOUNTER
Spoke to patient, I offered 3/12 for surgery and she accepted.  I let her know that I will be scheduling her pre-op appointments which are: virtual diet and info class, PAT, H&P and to meet with the doctor to sign consent.  That is any of these appointments are no showed or rescheduled it can push out her surgery date.  She understood.  I also let them know that I will be scheduling their follow up appointments after surgery.   That once everything is scheduled I will put all the information in a letter with dates, times, who they are going to see and if it is virtual or in person.  This letter will post to your my chart and I will send it out in the mail.  I will also call you once it is completed.  You will hear from me by end of day today.  She acknowledged ok and thank you

## 2025-01-30 NOTE — TELEPHONE ENCOUNTER
Called [employer] to check status     Spoke to Aubrie MCKEON and she confirmed that both [id number] and [id number] were approved for 1 unit for dates 2/26/25-5/27/25.  Auth # [id number].  No need to update DOS as long as surgery is scheduled between those dates.  I requested an approval fax that she said I should have within the hour.

## 2025-02-03 ENCOUNTER — OFFICE VISIT (OUTPATIENT)
Age: 45
End: 2025-02-03
Payer: COMMERCIAL

## 2025-02-03 VITALS
TEMPERATURE: 97.4 F | BODY MASS INDEX: 48.82 KG/M2 | SYSTOLIC BLOOD PRESSURE: 119 MMHG | DIASTOLIC BLOOD PRESSURE: 78 MMHG | HEART RATE: 70 BPM | HEIGHT: 65 IN | WEIGHT: 293 LBS | OXYGEN SATURATION: 99 % | RESPIRATION RATE: 18 BRPM

## 2025-02-03 DIAGNOSIS — D50.8 IRON DEFICIENCY ANEMIA SECONDARY TO INADEQUATE DIETARY IRON INTAKE: Primary | ICD-10-CM

## 2025-02-03 PROCEDURE — 99213 OFFICE O/P EST LOW 20 MIN: CPT | Performed by: NURSE PRACTITIONER

## 2025-02-03 ASSESSMENT — PATIENT HEALTH QUESTIONNAIRE - PHQ9
SUM OF ALL RESPONSES TO PHQ QUESTIONS 1-9: 0
SUM OF ALL RESPONSES TO PHQ QUESTIONS 1-9: 0
1. LITTLE INTEREST OR PLEASURE IN DOING THINGS: NOT AT ALL
SUM OF ALL RESPONSES TO PHQ QUESTIONS 1-9: 0
2. FEELING DOWN, DEPRESSED OR HOPELESS: NOT AT ALL
SUM OF ALL RESPONSES TO PHQ QUESTIONS 1-9: 0
SUM OF ALL RESPONSES TO PHQ9 QUESTIONS 1 & 2: 0

## 2025-02-03 NOTE — PROGRESS NOTES
Patient identified with two identification factors, Name and Date of Birth.    Chief Complaint   Patient presents with    Follow-up     Iron deficiency anemia secondary to inadequate dietary iron intake       /78 (Site: Right Lower Arm, Position: Sitting, Cuff Size: Large Adult)   Pulse 70   Temp 97.4 °F (36.3 °C) (Oral)   Resp 18   Ht 1.651 m (5' 5\")   Wt (!) 138.5 kg (305 lb 6.4 oz)   LMP 01/31/2025   SpO2 99%   BMI 50.82 kg/m²       1. \"Have you been to the ER, urgent care clinic since your last visit?  Hospitalized since your last visit?\" No     2. \"Have you seen or consulted any other health care providers outside of the [facility] since your last visit?\" No     
12/31/24  - no currently bleeding reported. Endoscopy/colonoscopy is up to date   - this is likely dietary, patient has had a gastric sleeve in the past    - reinforced iron rich diet. Plan to re-check CBC, iron studies, ferritin. Continue oral iron.     - Follow-up in 3-6 months depending on lab results     The patient will continue to be seen long-term as part of ongoing care related to her serious or complex medical condition.    Plan:     Orders Placed This Encounter   Procedures    CBC with Auto Differential    Iron and TIBC    Ferritin     Return in about 6 months (around 8/3/2025) for Labs, Office visit.    Signed By: JACKIE Flores - LAUREN      Hematology/Oncology Nurse Practitioner  [facility]

## 2025-02-10 ENCOUNTER — HOSPITAL ENCOUNTER (OUTPATIENT)
Facility: HOSPITAL | Age: 45
Discharge: HOME | End: 2025-02-13
Payer: COMMERCIAL

## 2025-02-10 VITALS
BODY MASS INDEX: 50.02 KG/M2 | SYSTOLIC BLOOD PRESSURE: 141 MMHG | OXYGEN SATURATION: 100 % | HEART RATE: 68 BPM | DIASTOLIC BLOOD PRESSURE: 94 MMHG | HEIGHT: 64 IN | WEIGHT: 293 LBS | TEMPERATURE: 98.2 F

## 2025-02-10 LAB
ALBUMIN SERPL-MCNC: 3.3 G/DL (ref 3.5–5)
ALBUMIN/GLOB SERPL: 0.9 (ref 1.1–2.2)
ALP SERPL-CCNC: 80 U/L (ref 45–117)
ALT SERPL-CCNC: 28 U/L (ref 12–78)
ANION GAP SERPL CALC-SCNC: 4 MMOL/L (ref 2–12)
APPEARANCE UR: CLEAR
AST SERPL-CCNC: 16 U/L (ref 15–37)
BACTERIA URNS QL MICRO: NEGATIVE /HPF
BASOPHILS # BLD: 0.03 K/UL (ref 0–0.1)
BASOPHILS NFR BLD: 0.6 % (ref 0–1)
BILIRUB SERPL-MCNC: 0.5 MG/DL (ref 0.2–1)
BILIRUB UR QL: NEGATIVE
BUN SERPL-MCNC: 13 MG/DL (ref 6–20)
BUN/CREAT SERPL: 24 (ref 12–20)
CALCIUM SERPL-MCNC: 9.9 MG/DL (ref 8.5–10.1)
CHLORIDE SERPL-SCNC: 110 MMOL/L (ref 97–108)
CO2 SERPL-SCNC: 26 MMOL/L (ref 21–32)
COLOR UR: NORMAL
CREAT SERPL-MCNC: 0.54 MG/DL (ref 0.55–1.02)
DIFFERENTIAL METHOD BLD: ABNORMAL
EKG ATRIAL RATE: 60 BPM
EKG DIAGNOSIS: NORMAL
EKG P AXIS: -14 DEGREES
EKG P-R INTERVAL: 184 MS
EKG Q-T INTERVAL: 368 MS
EKG QRS DURATION: 98 MS
EKG QTC CALCULATION (BAZETT): 368 MS
EKG R AXIS: 42 DEGREES
EKG T AXIS: 0 DEGREES
EKG VENTRICULAR RATE: 60 BPM
EOSINOPHIL # BLD: 0.04 K/UL (ref 0–0.4)
EOSINOPHIL NFR BLD: 0.8 % (ref 0–7)
EPITH CASTS URNS QL MICRO: NORMAL /LPF
ERYTHROCYTE [DISTWIDTH] IN BLOOD BY AUTOMATED COUNT: 16.6 % (ref 11.5–14.5)
GLOBULIN SER CALC-MCNC: 3.7 G/DL (ref 2–4)
GLUCOSE SERPL-MCNC: 88 MG/DL (ref 65–100)
GLUCOSE UR STRIP.AUTO-MCNC: NEGATIVE MG/DL
HCT VFR BLD AUTO: 38.5 % (ref 35–47)
HGB BLD-MCNC: 11.9 G/DL (ref 11.5–16)
HGB UR QL STRIP: NEGATIVE
HYALINE CASTS URNS QL MICRO: NORMAL /LPF (ref 0–5)
IMM GRANULOCYTES # BLD AUTO: 0.01 K/UL (ref 0–0.04)
IMM GRANULOCYTES NFR BLD AUTO: 0.2 % (ref 0–0.5)
KETONES UR QL STRIP.AUTO: NEGATIVE MG/DL
LEUKOCYTE ESTERASE UR QL STRIP.AUTO: NEGATIVE
LYMPHOCYTES # BLD: 2.22 K/UL (ref 0.8–3.5)
LYMPHOCYTES NFR BLD: 44.3 % (ref 12–49)
MAGNESIUM SERPL-MCNC: 1.8 MG/DL (ref 1.6–2.4)
MCH RBC QN AUTO: 27.9 PG (ref 26–34)
MCHC RBC AUTO-ENTMCNC: 30.9 G/DL (ref 30–36.5)
MCV RBC AUTO: 90.4 FL (ref 80–99)
MONOCYTES # BLD: 0.32 K/UL (ref 0–1)
MONOCYTES NFR BLD: 6.4 % (ref 5–13)
NEUTS SEG # BLD: 2.39 K/UL (ref 1.8–8)
NEUTS SEG NFR BLD: 47.7 % (ref 32–75)
NITRITE UR QL STRIP.AUTO: NEGATIVE
NRBC # BLD: 0 K/UL (ref 0–0.01)
NRBC BLD-RTO: 0 PER 100 WBC
PH UR STRIP: 6.5 (ref 5–8)
PLATELET # BLD AUTO: 221 K/UL (ref 150–400)
PMV BLD AUTO: 12.7 FL (ref 8.9–12.9)
POTASSIUM SERPL-SCNC: 4.1 MMOL/L (ref 3.5–5.1)
PROT SERPL-MCNC: 7 G/DL (ref 6.4–8.2)
PROT UR STRIP-MCNC: NEGATIVE MG/DL
RBC # BLD AUTO: 4.26 M/UL (ref 3.8–5.2)
RBC #/AREA URNS HPF: NORMAL /HPF (ref 0–5)
SODIUM SERPL-SCNC: 140 MMOL/L (ref 136–145)
SP GR UR REFRACTOMETRY: 1.02 (ref 1–1.03)
T4 FREE SERPL-MCNC: 0.9 NG/DL (ref 0.8–1.5)
TSH SERPL DL<=0.05 MIU/L-ACNC: 1.76 UIU/ML (ref 0.36–3.74)
URINE CULTURE IF INDICATED: NORMAL
UROBILINOGEN UR QL STRIP.AUTO: 0.2 EU/DL (ref 0.2–1)
WBC # BLD AUTO: 5 K/UL (ref 3.6–11)
WBC URNS QL MICRO: NORMAL /HPF (ref 0–4)

## 2025-02-10 PROCEDURE — 36415 COLL VENOUS BLD VENIPUNCTURE: CPT

## 2025-02-10 PROCEDURE — 80053 COMPREHEN METABOLIC PANEL: CPT

## 2025-02-10 PROCEDURE — 84439 ASSAY OF FREE THYROXINE: CPT

## 2025-02-10 PROCEDURE — 81001 URINALYSIS AUTO W/SCOPE: CPT

## 2025-02-10 PROCEDURE — 84443 ASSAY THYROID STIM HORMONE: CPT

## 2025-02-10 PROCEDURE — 71046 X-RAY EXAM CHEST 2 VIEWS: CPT

## 2025-02-10 PROCEDURE — 83735 ASSAY OF MAGNESIUM: CPT

## 2025-02-10 PROCEDURE — 93005 ELECTROCARDIOGRAM TRACING: CPT | Performed by: SURGERY

## 2025-02-10 PROCEDURE — 85025 COMPLETE CBC W/AUTO DIFF WBC: CPT

## 2025-02-10 RX ORDER — BUDESONIDE AND FORMOTEROL FUMARATE DIHYDRATE 80; 4.5 UG/1; UG/1
2 AEROSOL RESPIRATORY (INHALATION) 2 TIMES DAILY
COMMUNITY

## 2025-02-10 NOTE — PERIOP NOTE
[address] [address]   MAIN OR                                  [phone]    MAIN PRE OP             [phone]                                                                                AMBULATORY PRE OP          [phone]  PRE-ADMISSION TESTING    [phone]     Surgery Date:  3/12/25       [place] staff will call you between 4 and 7pm the day before your surgery with your arrival time. (If your surgery is on a Monday, we will call you the Friday before.)    Call [phone] after 7pm Monday-Friday if you did not receive this call.    INSTRUCTIONS BEFORE YOUR SURGERY   When You  Arrive Arrive at [facility] Patient Access on 1st floor the day of your surgery.  Have your insurance card, photo ID,living will/advanced directive/POA (if applicable),  and any copayment (if needed)   Food   and   Drink SEE DIET INSTRUCTIONS BELOW     Absolutely NO alcohol of any kind 2 weeks before surgery and continue to avoid after surgery. Alcohol is not safe before or after surgery. Please discuss with your bariatric provider before resuming alcohol use. You must be 100% smoke free (tobacco and marijuana - smoking or edibles) for at least 3 months prior to surgery. Surgery will be cancelled if you are smoking. Stop smoking before surgery (helps w/healing and breathing).   Medications to   TAKE   Morning of Surgery MEDICATIONS TO TAKE THE MORNING OF SURGERY WITH A SIP OF WATER: USE INHALER, USE EYE DROPS, PANTOPRAZOLE              Medications to STOP  before surgery Non-Steroidal anti-inflammatory Drugs (NSAID's): for example, Diclofenac (Voltaren),  Ibuprofen (Advil, Motrin), Naproxen (Aleve) 3 days    STOP all herbal supplements and vitamins(unless prescribed by your doctor), and fish oil for 7 days ON 3/5/25        (Pain medications not listed above, including Tylenol may be taken up until 4 hours prior to arrival time)       Bathing

## 2025-02-19 ENCOUNTER — TELEMEDICINE (OUTPATIENT)
Age: 45
End: 2025-02-19

## 2025-02-19 VITALS — BODY MASS INDEX: 52.18 KG/M2 | WEIGHT: 293 LBS

## 2025-02-19 DIAGNOSIS — K21.9 GASTROESOPHAGEAL REFLUX DISEASE WITHOUT ESOPHAGITIS: ICD-10-CM

## 2025-02-19 DIAGNOSIS — G89.18 POST-OP PAIN: ICD-10-CM

## 2025-02-19 DIAGNOSIS — G47.33 OBSTRUCTIVE SLEEP APNEA: ICD-10-CM

## 2025-02-19 DIAGNOSIS — E66.01 MORBID OBESITY: Primary | ICD-10-CM

## 2025-02-19 PROCEDURE — 99024 POSTOP FOLLOW-UP VISIT: CPT | Performed by: NURSE PRACTITIONER

## 2025-02-19 RX ORDER — GABAPENTIN 100 MG/1
100-200 CAPSULE ORAL 3 TIMES DAILY
Qty: 30 CAPSULE | Refills: 0 | Status: SHIPPED | OUTPATIENT
Start: 2025-02-19 | End: 2025-02-24

## 2025-02-19 RX ORDER — POLYETHYLENE GLYCOL 3350 17 G/17G
17 POWDER, FOR SOLUTION ORAL DAILY
Qty: 1530 G | Refills: 0 | Status: SHIPPED | OUTPATIENT
Start: 2025-02-19 | End: 2025-05-20

## 2025-02-19 RX ORDER — ENOXAPARIN SODIUM 100 MG/ML
40 INJECTION SUBCUTANEOUS DAILY
Qty: 14 EACH | Refills: 0 | Status: SHIPPED | OUTPATIENT
Start: 2025-02-19

## 2025-02-19 RX ORDER — ONDANSETRON 4 MG/1
4 TABLET, FILM COATED ORAL EVERY 8 HOURS PRN
Qty: 30 TABLET | Refills: 0 | Status: SHIPPED | OUTPATIENT
Start: 2025-02-19

## 2025-02-19 ASSESSMENT — PATIENT HEALTH QUESTIONNAIRE - PHQ9
SUM OF ALL RESPONSES TO PHQ QUESTIONS 1-9: 0
1. LITTLE INTEREST OR PLEASURE IN DOING THINGS: NOT AT ALL
SUM OF ALL RESPONSES TO PHQ QUESTIONS 1-9: 0
SUM OF ALL RESPONSES TO PHQ9 QUESTIONS 1 & 2: 0
SUM OF ALL RESPONSES TO PHQ QUESTIONS 1-9: 0
SUM OF ALL RESPONSES TO PHQ QUESTIONS 1-9: 0
2. FEELING DOWN, DEPRESSED OR HOPELESS: NOT AT ALL

## 2025-02-19 NOTE — PROGRESS NOTES
Identified patient with two patient identifiers (name and ). Reviewed chart in preparation for visit and have obtained necessary documentation.    Maggie Lin is a 44 y.o. female  Chief Complaint   Patient presents with    Other     H&P 3/12 BC: ROBOTIC ASSISTED LAPAROSCOPIC HIATAL HERNIA REPAIR WITH REVISION OF SLEEVE GASTRECTOMY TO GASTRIC BYPASS, EGD *ERAS* (DO NOT BILL INSURANCE)     Wt (!) 137.9 kg (304 lb)   LMP 2025 (Exact Date)   BMI 52.18 kg/m²     1. Have you been to the ER, urgent care clinic since your last visit?  Hospitalized since your last visit?no    2. Have you seen or consulted any other health care providers outside of the [facility] since your last visit?  Include any pap smears or colon screening. no   
bring CPAP with her to the hospital.      Jalendeborah MIKE Lin, was evaluated through a synchronous (real-time) audio-video encounter. The patient (or guardian if applicable) is aware that this is a billable service, which includes applicable co-pays. This Virtual Visit was conducted with patient's (and/or legal guardian's) consent. Patient identification was verified, and a caregiver was present when appropriate.   The patient was located at Home: [address] [facility] [address]  Provider was located at Facility (Appt Dept): [address] [facility]  [address]  [place] [address]  Confirm you are appropriately licensed, registered, or certified to deliver care in the state where the patient is located as indicated above. If you are not or unsure, please re-schedule the visit: Yes, I confirm.          --JACKIE Robbins NP on 2/19/2025 at 1:47 PM    An electronic signature was used to authenticate this note.     pt verbalized understanding and questions were answered to the best of my knowledge and ability.            Signed By: JACKIE Robbins NP     February 19, 2025

## 2025-03-03 ENCOUNTER — OFFICE VISIT (OUTPATIENT)
Age: 45
End: 2025-03-03

## 2025-03-03 VITALS
BODY MASS INDEX: 50.02 KG/M2 | SYSTOLIC BLOOD PRESSURE: 120 MMHG | OXYGEN SATURATION: 98 % | RESPIRATION RATE: 16 BRPM | HEART RATE: 77 BPM | WEIGHT: 293 LBS | TEMPERATURE: 97.9 F | HEIGHT: 64 IN | DIASTOLIC BLOOD PRESSURE: 86 MMHG

## 2025-03-03 DIAGNOSIS — E66.01 MORBID OBESITY: ICD-10-CM

## 2025-03-03 DIAGNOSIS — K21.9 GASTROESOPHAGEAL REFLUX DISEASE WITHOUT ESOPHAGITIS: Primary | ICD-10-CM

## 2025-03-03 DIAGNOSIS — J45.20 MILD INTERMITTENT ASTHMA WITHOUT COMPLICATION: ICD-10-CM

## 2025-03-03 DIAGNOSIS — K44.9 HIATAL HERNIA: ICD-10-CM

## 2025-03-03 ASSESSMENT — PATIENT HEALTH QUESTIONNAIRE - PHQ9
2. FEELING DOWN, DEPRESSED OR HOPELESS: NOT AT ALL
SUM OF ALL RESPONSES TO PHQ QUESTIONS 1-9: 0
1. LITTLE INTEREST OR PLEASURE IN DOING THINGS: NOT AT ALL
SUM OF ALL RESPONSES TO PHQ QUESTIONS 1-9: 0

## 2025-03-03 NOTE — PROGRESS NOTES
Identified patient with two patient identifiers (name and ). Reviewed chart in preparation for visit and have obtained necessary documentation.    Maggie Lin is a 44 y.o. female  Chief Complaint   Patient presents with    Weight Management     Scheduled for revision of sleeve to lap gastric bypass, Hiatal hernia repair on 3/12/25 with Dr Chidi Cruz     /86 (Site: Right Lower Arm, Position: Sitting, Cuff Size: Large Adult)   Pulse 77   Temp 97.9 °F (36.6 °C)   Resp 16   Ht 1.626 m (5' 4\")   Wt (!) 138.6 kg (305 lb 8 oz)   LMP 2025 (Exact Date)   SpO2 98%   BMI 52.44 kg/m²     1. Have you been to the ER, urgent care clinic since your last visit?  Hospitalized since your last visit?no    2. Have you seen or consulted any other health care providers outside of the [facility] since your last visit?  Include any pap smears or colon screening. no  
Exam:  GENERAL: morbidly obese.    Assessment:     Persistent morbid obesity following sleeve gastrectomy with worsening reflux symptoms, hiatal hernia.  No improvement with medical management.    Plan:     Robotic assisted laparoscopic hiatal hernia repair with revision of sleeve gastrectomy to gastric bypass.  Technical aspects of procedure reviewed along with risks (to include but not limited to bleeding, wound infection, VTE, leak, open procedure, ulcer, stricture, nutritional deficiency, poor weight loss/weight regain).  Also reviewed anticipated hospital course, postoperative diet, and activity restrictions.  She understands and desires to proceed.  All questions answered.      Signed By: Chidi Cruz MD     March 3, 2025

## 2025-03-12 ENCOUNTER — ANESTHESIA (OUTPATIENT)
Facility: HOSPITAL | Age: 45
End: 2025-03-12
Payer: COMMERCIAL

## 2025-03-12 ENCOUNTER — HOSPITAL ENCOUNTER (INPATIENT)
Facility: HOSPITAL | Age: 45
LOS: 3 days | Discharge: HOME | End: 2025-03-15
Attending: SURGERY | Admitting: SURGERY
Payer: COMMERCIAL

## 2025-03-12 ENCOUNTER — ANESTHESIA EVENT (OUTPATIENT)
Facility: HOSPITAL | Age: 45
End: 2025-03-12
Payer: COMMERCIAL

## 2025-03-12 DIAGNOSIS — Z98.84 S/P GASTRIC BYPASS: ICD-10-CM

## 2025-03-12 DIAGNOSIS — E66.01 MORBID OBESITY: Primary | ICD-10-CM

## 2025-03-12 LAB — HCG UR QL: NEGATIVE

## 2025-03-12 PROCEDURE — 3600000019 HC SURGERY ROBOT ADDTL 15MIN: Performed by: SURGERY

## 2025-03-12 PROCEDURE — 0DJ08ZZ INSPECTION OF UPPER INTESTINAL TRACT, VIA NATURAL OR ARTIFICIAL OPENING ENDOSCOPIC: ICD-10-PCS | Performed by: SURGERY

## 2025-03-12 PROCEDURE — C1781 MESH (IMPLANTABLE): HCPCS | Performed by: SURGERY

## 2025-03-12 PROCEDURE — 2580000003 HC RX 258: Performed by: SURGERY

## 2025-03-12 PROCEDURE — 6360000002 HC RX W HCPCS: Performed by: ANESTHESIOLOGY

## 2025-03-12 PROCEDURE — 81025 URINE PREGNANCY TEST: CPT

## 2025-03-12 PROCEDURE — 2709999900 HC NON-CHARGEABLE SUPPLY: Performed by: SURGERY

## 2025-03-12 PROCEDURE — 2580000003 HC RX 258: Performed by: ANESTHESIOLOGY

## 2025-03-12 PROCEDURE — 6360000002 HC RX W HCPCS: Performed by: NURSE ANESTHETIST, CERTIFIED REGISTERED

## 2025-03-12 PROCEDURE — 6360000002 HC RX W HCPCS: Performed by: SURGERY

## 2025-03-12 PROCEDURE — 8E0W4CZ ROBOTIC ASSISTED PROCEDURE OF TRUNK REGION, PERCUTANEOUS ENDOSCOPIC APPROACH: ICD-10-PCS | Performed by: SURGERY

## 2025-03-12 PROCEDURE — 7100000001 HC PACU RECOVERY - ADDTL 15 MIN: Performed by: SURGERY

## 2025-03-12 PROCEDURE — 6370000000 HC RX 637 (ALT 250 FOR IP): Performed by: ANESTHESIOLOGY

## 2025-03-12 PROCEDURE — 7100000000 HC PACU RECOVERY - FIRST 15 MIN: Performed by: SURGERY

## 2025-03-12 PROCEDURE — 1100000000 HC RM PRIVATE

## 2025-03-12 PROCEDURE — 3700000001 HC ADD 15 MINUTES (ANESTHESIA): Performed by: SURGERY

## 2025-03-12 PROCEDURE — 3600000009 HC SURGERY ROBOT BASE: Performed by: SURGERY

## 2025-03-12 PROCEDURE — 2500000003 HC RX 250 WO HCPCS: Performed by: NURSE ANESTHETIST, CERTIFIED REGISTERED

## 2025-03-12 PROCEDURE — 0BQT4ZZ REPAIR DIAPHRAGM, PERCUTANEOUS ENDOSCOPIC APPROACH: ICD-10-PCS | Performed by: SURGERY

## 2025-03-12 PROCEDURE — 3700000000 HC ANESTHESIA ATTENDED CARE: Performed by: SURGERY

## 2025-03-12 PROCEDURE — S2900 ROBOTIC SURGICAL SYSTEM: HCPCS | Performed by: SURGERY

## 2025-03-12 PROCEDURE — 0D164ZA BYPASS STOMACH TO JEJUNUM, PERCUTANEOUS ENDOSCOPIC APPROACH: ICD-10-PCS | Performed by: SURGERY

## 2025-03-12 PROCEDURE — 6370000000 HC RX 637 (ALT 250 FOR IP): Performed by: SURGERY

## 2025-03-12 PROCEDURE — 2580000003 HC RX 258: Performed by: NURSE ANESTHETIST, CERTIFIED REGISTERED

## 2025-03-12 PROCEDURE — 2720000010 HC SURG SUPPLY STERILE: Performed by: SURGERY

## 2025-03-12 DEVICE — STAPLE SKIN LN REINF 60 MM ECHELON ENDOPATH: Type: IMPLANTABLE DEVICE | Site: ABDOMEN | Status: FUNCTIONAL

## 2025-03-12 RX ORDER — SODIUM CHLORIDE 0.9 % (FLUSH) 0.9 %
5-40 SYRINGE (ML) INJECTION PRN
Status: DISCONTINUED | OUTPATIENT
Start: 2025-03-12 | End: 2025-03-12 | Stop reason: HOSPADM

## 2025-03-12 RX ORDER — ONDANSETRON 2 MG/ML
4 INJECTION INTRAMUSCULAR; INTRAVENOUS
Status: COMPLETED | OUTPATIENT
Start: 2025-03-12 | End: 2025-03-12

## 2025-03-12 RX ORDER — PROCHLORPERAZINE EDISYLATE 5 MG/ML
5 INJECTION INTRAMUSCULAR; INTRAVENOUS
Status: DISCONTINUED | OUTPATIENT
Start: 2025-03-12 | End: 2025-03-12 | Stop reason: HOSPADM

## 2025-03-12 RX ORDER — DIPHENHYDRAMINE HYDROCHLORIDE 50 MG/ML
12.5 INJECTION, SOLUTION INTRAMUSCULAR; INTRAVENOUS
Status: COMPLETED | OUTPATIENT
Start: 2025-03-12 | End: 2025-03-12

## 2025-03-12 RX ORDER — ONDANSETRON 4 MG/1
4 TABLET, ORALLY DISINTEGRATING ORAL EVERY 8 HOURS PRN
Status: DISCONTINUED | OUTPATIENT
Start: 2025-03-12 | End: 2025-03-15 | Stop reason: HOSPADM

## 2025-03-12 RX ORDER — ONDANSETRON 2 MG/ML
4 INJECTION INTRAMUSCULAR; INTRAVENOUS ONCE
Status: DISCONTINUED | OUTPATIENT
Start: 2025-03-12 | End: 2025-03-12 | Stop reason: HOSPADM

## 2025-03-12 RX ORDER — LIDOCAINE HYDROCHLORIDE ANHYDROUS AND DEXTROSE MONOHYDRATE 5; 400 G/100ML; MG/100ML
INJECTION, SOLUTION INTRAVENOUS
Status: DISCONTINUED | OUTPATIENT
Start: 2025-03-12 | End: 2025-03-12 | Stop reason: SDUPTHER

## 2025-03-12 RX ORDER — GABAPENTIN 250 MG/5ML
125 SOLUTION ORAL EVERY 8 HOURS SCHEDULED
Status: DISCONTINUED | OUTPATIENT
Start: 2025-03-12 | End: 2025-03-15 | Stop reason: HOSPADM

## 2025-03-12 RX ORDER — HYDROMORPHONE HYDROCHLORIDE 1 MG/ML
1 INJECTION, SOLUTION INTRAMUSCULAR; INTRAVENOUS; SUBCUTANEOUS
Status: DISCONTINUED | OUTPATIENT
Start: 2025-03-12 | End: 2025-03-15 | Stop reason: HOSPADM

## 2025-03-12 RX ORDER — ONDANSETRON 2 MG/ML
4 INJECTION INTRAMUSCULAR; INTRAVENOUS EVERY 6 HOURS PRN
Status: DISCONTINUED | OUTPATIENT
Start: 2025-03-12 | End: 2025-03-15 | Stop reason: HOSPADM

## 2025-03-12 RX ORDER — SODIUM CHLORIDE, SODIUM LACTATE, POTASSIUM CHLORIDE, CALCIUM CHLORIDE 600; 310; 30; 20 MG/100ML; MG/100ML; MG/100ML; MG/100ML
INJECTION, SOLUTION INTRAVENOUS CONTINUOUS
Status: DISCONTINUED | OUTPATIENT
Start: 2025-03-12 | End: 2025-03-12 | Stop reason: HOSPADM

## 2025-03-12 RX ORDER — ACETAMINOPHEN 500 MG
1000 TABLET ORAL ONCE
Status: COMPLETED | OUTPATIENT
Start: 2025-03-12 | End: 2025-03-12

## 2025-03-12 RX ORDER — MIDAZOLAM HYDROCHLORIDE 1 MG/ML
INJECTION, SOLUTION INTRAMUSCULAR; INTRAVENOUS
Status: DISCONTINUED | OUTPATIENT
Start: 2025-03-12 | End: 2025-03-12 | Stop reason: SDUPTHER

## 2025-03-12 RX ORDER — NALOXONE HYDROCHLORIDE 0.4 MG/ML
INJECTION, SOLUTION INTRAMUSCULAR; INTRAVENOUS; SUBCUTANEOUS PRN
Status: DISCONTINUED | OUTPATIENT
Start: 2025-03-12 | End: 2025-03-12 | Stop reason: HOSPADM

## 2025-03-12 RX ORDER — OXYCODONE HYDROCHLORIDE 5 MG/1
5 TABLET ORAL
Status: DISCONTINUED | OUTPATIENT
Start: 2025-03-12 | End: 2025-03-12 | Stop reason: HOSPADM

## 2025-03-12 RX ORDER — FENTANYL CITRATE 50 UG/ML
100 INJECTION, SOLUTION INTRAMUSCULAR; INTRAVENOUS
Status: DISCONTINUED | OUTPATIENT
Start: 2025-03-12 | End: 2025-03-12 | Stop reason: HOSPADM

## 2025-03-12 RX ORDER — SODIUM CHLORIDE 0.9 % (FLUSH) 0.9 %
5-40 SYRINGE (ML) INJECTION PRN
Status: DISCONTINUED | OUTPATIENT
Start: 2025-03-12 | End: 2025-03-15 | Stop reason: HOSPADM

## 2025-03-12 RX ORDER — SODIUM CHLORIDE, SODIUM LACTATE, POTASSIUM CHLORIDE, CALCIUM CHLORIDE 600; 310; 30; 20 MG/100ML; MG/100ML; MG/100ML; MG/100ML
INJECTION, SOLUTION INTRAVENOUS CONTINUOUS
Status: DISCONTINUED | OUTPATIENT
Start: 2025-03-12 | End: 2025-03-15 | Stop reason: HOSPADM

## 2025-03-12 RX ORDER — SODIUM CHLORIDE 9 MG/ML
INJECTION, SOLUTION INTRAVENOUS PRN
Status: DISCONTINUED | OUTPATIENT
Start: 2025-03-12 | End: 2025-03-15 | Stop reason: HOSPADM

## 2025-03-12 RX ORDER — LATANOPROST 50 UG/ML
1 SOLUTION/ DROPS OPHTHALMIC NIGHTLY
Status: DISCONTINUED | OUTPATIENT
Start: 2025-03-12 | End: 2025-03-15 | Stop reason: HOSPADM

## 2025-03-12 RX ORDER — ACETAMINOPHEN 160 MG/5ML
650 LIQUID ORAL
Status: DISCONTINUED | OUTPATIENT
Start: 2025-03-12 | End: 2025-03-12 | Stop reason: HOSPADM

## 2025-03-12 RX ORDER — BUPIVACAINE HYDROCHLORIDE 5 MG/ML
INJECTION, SOLUTION EPIDURAL; INTRACAUDAL; PERINEURAL PRN
Status: DISCONTINUED | OUTPATIENT
Start: 2025-03-12 | End: 2025-03-12 | Stop reason: HOSPADM

## 2025-03-12 RX ORDER — DEXAMETHASONE SODIUM PHOSPHATE 4 MG/ML
INJECTION, SOLUTION INTRA-ARTICULAR; INTRALESIONAL; INTRAMUSCULAR; INTRAVENOUS; SOFT TISSUE
Status: DISCONTINUED | OUTPATIENT
Start: 2025-03-12 | End: 2025-03-12 | Stop reason: SDUPTHER

## 2025-03-12 RX ORDER — HYDROMORPHONE HYDROCHLORIDE 2 MG/ML
INJECTION, SOLUTION INTRAMUSCULAR; INTRAVENOUS; SUBCUTANEOUS
Status: DISCONTINUED | OUTPATIENT
Start: 2025-03-12 | End: 2025-03-12 | Stop reason: SDUPTHER

## 2025-03-12 RX ORDER — LIDOCAINE HYDROCHLORIDE 20 MG/ML
INJECTION, SOLUTION EPIDURAL; INFILTRATION; INTRACAUDAL; PERINEURAL
Status: DISCONTINUED | OUTPATIENT
Start: 2025-03-12 | End: 2025-03-12 | Stop reason: SDUPTHER

## 2025-03-12 RX ORDER — MAGNESIUM SULFATE HEPTAHYDRATE 40 MG/ML
INJECTION, SOLUTION INTRAVENOUS
Status: DISCONTINUED | OUTPATIENT
Start: 2025-03-12 | End: 2025-03-12 | Stop reason: SDUPTHER

## 2025-03-12 RX ORDER — SODIUM CHLORIDE 9 MG/ML
INJECTION, SOLUTION INTRAVENOUS PRN
Status: DISCONTINUED | OUTPATIENT
Start: 2025-03-12 | End: 2025-03-12 | Stop reason: HOSPADM

## 2025-03-12 RX ORDER — ONDANSETRON 2 MG/ML
INJECTION INTRAMUSCULAR; INTRAVENOUS
Status: DISCONTINUED | OUTPATIENT
Start: 2025-03-12 | End: 2025-03-12 | Stop reason: SDUPTHER

## 2025-03-12 RX ORDER — SODIUM CHLORIDE 0.9 % (FLUSH) 0.9 %
5-40 SYRINGE (ML) INJECTION EVERY 12 HOURS SCHEDULED
Status: DISCONTINUED | OUTPATIENT
Start: 2025-03-12 | End: 2025-03-12 | Stop reason: HOSPADM

## 2025-03-12 RX ORDER — FENTANYL CITRATE 50 UG/ML
INJECTION, SOLUTION INTRAMUSCULAR; INTRAVENOUS
Status: DISCONTINUED | OUTPATIENT
Start: 2025-03-12 | End: 2025-03-12 | Stop reason: SDUPTHER

## 2025-03-12 RX ORDER — SUCCINYLCHOLINE/SOD CL,ISO/PF 200MG/10ML
SYRINGE (ML) INTRAVENOUS
Status: DISCONTINUED | OUTPATIENT
Start: 2025-03-12 | End: 2025-03-12 | Stop reason: SDUPTHER

## 2025-03-12 RX ORDER — SODIUM CHLORIDE 0.9 % (FLUSH) 0.9 %
5-40 SYRINGE (ML) INJECTION EVERY 12 HOURS SCHEDULED
Status: DISCONTINUED | OUTPATIENT
Start: 2025-03-12 | End: 2025-03-15 | Stop reason: HOSPADM

## 2025-03-12 RX ORDER — DORZOLAMIDE HCL 20 MG/ML
1 SOLUTION/ DROPS OPHTHALMIC 2 TIMES DAILY
Status: DISCONTINUED | OUTPATIENT
Start: 2025-03-12 | End: 2025-03-15 | Stop reason: HOSPADM

## 2025-03-12 RX ORDER — METRONIDAZOLE 500 MG/100ML
500 INJECTION, SOLUTION INTRAVENOUS
Status: COMPLETED | OUTPATIENT
Start: 2025-03-12 | End: 2025-03-12

## 2025-03-12 RX ORDER — SODIUM CHLORIDE, SODIUM LACTATE, POTASSIUM CHLORIDE, CALCIUM CHLORIDE 600; 310; 30; 20 MG/100ML; MG/100ML; MG/100ML; MG/100ML
INJECTION, SOLUTION INTRAVENOUS
Status: DISCONTINUED | OUTPATIENT
Start: 2025-03-12 | End: 2025-03-12 | Stop reason: SDUPTHER

## 2025-03-12 RX ORDER — LIDOCAINE HYDROCHLORIDE 10 MG/ML
1 INJECTION, SOLUTION EPIDURAL; INFILTRATION; INTRACAUDAL; PERINEURAL
Status: DISCONTINUED | OUTPATIENT
Start: 2025-03-12 | End: 2025-03-12 | Stop reason: HOSPADM

## 2025-03-12 RX ORDER — PHENYLEPHRINE HCL IN 0.9% NACL 0.4MG/10ML
SYRINGE (ML) INTRAVENOUS
Status: DISCONTINUED | OUTPATIENT
Start: 2025-03-12 | End: 2025-03-12 | Stop reason: SDUPTHER

## 2025-03-12 RX ORDER — LORAZEPAM 2 MG/ML
0.5 INJECTION INTRAMUSCULAR
Status: DISCONTINUED | OUTPATIENT
Start: 2025-03-12 | End: 2025-03-12 | Stop reason: HOSPADM

## 2025-03-12 RX ORDER — MIDAZOLAM HYDROCHLORIDE 2 MG/2ML
2 INJECTION, SOLUTION INTRAMUSCULAR; INTRAVENOUS AS NEEDED
Status: DISCONTINUED | OUTPATIENT
Start: 2025-03-12 | End: 2025-03-12 | Stop reason: HOSPADM

## 2025-03-12 RX ORDER — GABAPENTIN 250 MG/5ML
125 SOLUTION ORAL
Status: DISCONTINUED | OUTPATIENT
Start: 2025-03-12 | End: 2025-03-12 | Stop reason: HOSPADM

## 2025-03-12 RX ORDER — FENTANYL CITRATE 50 UG/ML
25 INJECTION, SOLUTION INTRAMUSCULAR; INTRAVENOUS EVERY 5 MIN PRN
Status: COMPLETED | OUTPATIENT
Start: 2025-03-12 | End: 2025-03-12

## 2025-03-12 RX ORDER — LORAZEPAM 2 MG/ML
1 CONCENTRATE ORAL EVERY 8 HOURS PRN
Status: DISCONTINUED | OUTPATIENT
Start: 2025-03-12 | End: 2025-03-15 | Stop reason: HOSPADM

## 2025-03-12 RX ORDER — IPRATROPIUM BROMIDE AND ALBUTEROL SULFATE 2.5; .5 MG/3ML; MG/3ML
1 SOLUTION RESPIRATORY (INHALATION)
Status: DISCONTINUED | OUTPATIENT
Start: 2025-03-12 | End: 2025-03-12 | Stop reason: HOSPADM

## 2025-03-12 RX ORDER — ACETAMINOPHEN 160 MG/5ML
650 LIQUID ORAL EVERY 6 HOURS SCHEDULED
Status: DISCONTINUED | OUTPATIENT
Start: 2025-03-12 | End: 2025-03-15 | Stop reason: HOSPADM

## 2025-03-12 RX ORDER — HYDROMORPHONE HYDROCHLORIDE 1 MG/ML
0.5 INJECTION, SOLUTION INTRAMUSCULAR; INTRAVENOUS; SUBCUTANEOUS EVERY 5 MIN PRN
Status: DISCONTINUED | OUTPATIENT
Start: 2025-03-12 | End: 2025-03-12 | Stop reason: HOSPADM

## 2025-03-12 RX ORDER — ENOXAPARIN SODIUM 100 MG/ML
40 INJECTION SUBCUTANEOUS DAILY
Status: DISCONTINUED | OUTPATIENT
Start: 2025-03-13 | End: 2025-03-15 | Stop reason: HOSPADM

## 2025-03-12 RX ORDER — ROCURONIUM BROMIDE 10 MG/ML
INJECTION, SOLUTION INTRAVENOUS
Status: DISCONTINUED | OUTPATIENT
Start: 2025-03-12 | End: 2025-03-12 | Stop reason: SDUPTHER

## 2025-03-12 RX ORDER — HYDRALAZINE HYDROCHLORIDE 20 MG/ML
10 INJECTION INTRAMUSCULAR; INTRAVENOUS
Status: DISCONTINUED | OUTPATIENT
Start: 2025-03-12 | End: 2025-03-12 | Stop reason: HOSPADM

## 2025-03-12 RX ORDER — PANTOPRAZOLE SODIUM 40 MG/1
40 TABLET, DELAYED RELEASE ORAL DAILY
Status: DISCONTINUED | OUTPATIENT
Start: 2025-03-13 | End: 2025-03-15 | Stop reason: HOSPADM

## 2025-03-12 RX ADMIN — HYDROMORPHONE HYDROCHLORIDE 0.5 MG: 1 INJECTION, SOLUTION INTRAMUSCULAR; INTRAVENOUS; SUBCUTANEOUS at 11:59

## 2025-03-12 RX ADMIN — SODIUM CHLORIDE 3000 MG: 9 INJECTION, SOLUTION INTRAVENOUS at 07:50

## 2025-03-12 RX ADMIN — SODIUM CHLORIDE, POTASSIUM CHLORIDE, SODIUM LACTATE AND CALCIUM CHLORIDE: 600; 310; 30; 20 INJECTION, SOLUTION INTRAVENOUS at 20:29

## 2025-03-12 RX ADMIN — Medication 30 MG: at 08:10

## 2025-03-12 RX ADMIN — ROCURONIUM BROMIDE 40 MG: 10 INJECTION INTRAVENOUS at 07:43

## 2025-03-12 RX ADMIN — Medication 200 MG: at 07:39

## 2025-03-12 RX ADMIN — LIDOCAINE HYDROCHLORIDE 1.5 MG/KG/HR: 4 INJECTION, SOLUTION INTRAVENOUS at 07:40

## 2025-03-12 RX ADMIN — SODIUM CHLORIDE, SODIUM LACTATE, POTASSIUM CHLORIDE, AND CALCIUM CHLORIDE: .6; .31; .03; .02 INJECTION, SOLUTION INTRAVENOUS at 06:27

## 2025-03-12 RX ADMIN — HYDROMORPHONE HYDROCHLORIDE 1 MG: 1 INJECTION, SOLUTION INTRAMUSCULAR; INTRAVENOUS; SUBCUTANEOUS at 13:17

## 2025-03-12 RX ADMIN — MIDAZOLAM HYDROCHLORIDE 3 MG: 1 INJECTION, SOLUTION INTRAMUSCULAR; INTRAVENOUS at 07:27

## 2025-03-12 RX ADMIN — ROCURONIUM BROMIDE 10 MG: 10 INJECTION INTRAVENOUS at 08:50

## 2025-03-12 RX ADMIN — FENTANYL CITRATE 25 MCG: 50 INJECTION INTRAMUSCULAR; INTRAVENOUS at 11:48

## 2025-03-12 RX ADMIN — Medication 10 MG: at 08:50

## 2025-03-12 RX ADMIN — HYDROMORPHONE HYDROCHLORIDE 1 MG: 1 INJECTION, SOLUTION INTRAMUSCULAR; INTRAVENOUS; SUBCUTANEOUS at 17:02

## 2025-03-12 RX ADMIN — HYDROMORPHONE HYDROCHLORIDE 1 MG: 2 INJECTION, SOLUTION INTRAMUSCULAR; INTRAVENOUS; SUBCUTANEOUS at 10:25

## 2025-03-12 RX ADMIN — Medication 120 MCG: at 08:02

## 2025-03-12 RX ADMIN — Medication 125 MG: at 22:39

## 2025-03-12 RX ADMIN — FENTANYL CITRATE 25 MCG: 50 INJECTION INTRAMUSCULAR; INTRAVENOUS at 11:17

## 2025-03-12 RX ADMIN — METRONIDAZOLE 500 MG: 5 INJECTION, SOLUTION INTRAVENOUS at 07:50

## 2025-03-12 RX ADMIN — FENTANYL CITRATE 25 MCG: 50 INJECTION INTRAMUSCULAR; INTRAVENOUS at 11:56

## 2025-03-12 RX ADMIN — SODIUM CHLORIDE, POTASSIUM CHLORIDE, SODIUM LACTATE AND CALCIUM CHLORIDE: 600; 310; 30; 20 INJECTION, SOLUTION INTRAVENOUS at 07:30

## 2025-03-12 RX ADMIN — PROPOFOL 200 MG: 10 INJECTION, EMULSION INTRAVENOUS at 07:39

## 2025-03-12 RX ADMIN — Medication 10 MG: at 08:33

## 2025-03-12 RX ADMIN — ONDANSETRON 4 MG: 2 INJECTION, SOLUTION INTRAMUSCULAR; INTRAVENOUS at 16:59

## 2025-03-12 RX ADMIN — Medication 125 MG: at 14:57

## 2025-03-12 RX ADMIN — FENTANYL CITRATE 25 MCG: 50 INJECTION INTRAMUSCULAR; INTRAVENOUS at 11:33

## 2025-03-12 RX ADMIN — FENTANYL CITRATE 100 MCG: 50 INJECTION INTRAMUSCULAR; INTRAVENOUS at 07:39

## 2025-03-12 RX ADMIN — MAGNESIUM SULFATE HEPTAHYDRATE 2000 MG: 40 INJECTION, SOLUTION INTRAVENOUS at 07:50

## 2025-03-12 RX ADMIN — ACETAMINOPHEN 650 MG: 650 SOLUTION ORAL at 17:05

## 2025-03-12 RX ADMIN — SODIUM CHLORIDE, POTASSIUM CHLORIDE, SODIUM LACTATE AND CALCIUM CHLORIDE: 600; 310; 30; 20 INJECTION, SOLUTION INTRAVENOUS at 10:57

## 2025-03-12 RX ADMIN — DEXAMETHASONE SODIUM PHOSPHATE 4 MG: 4 INJECTION INTRA-ARTICULAR; INTRALESIONAL; INTRAMUSCULAR; INTRAVENOUS; SOFT TISSUE at 07:50

## 2025-03-12 RX ADMIN — ACETAMINOPHEN 650 MG: 650 SOLUTION ORAL at 22:41

## 2025-03-12 RX ADMIN — HYDROMORPHONE HYDROCHLORIDE 1 MG: 1 INJECTION, SOLUTION INTRAMUSCULAR; INTRAVENOUS; SUBCUTANEOUS at 23:22

## 2025-03-12 RX ADMIN — PHENYLEPHRINE HYDROCHLORIDE 40 MCG/MIN: 10 INJECTION INTRAVENOUS at 08:20

## 2025-03-12 RX ADMIN — LATANOPROST 1 DROP: 50 SOLUTION OPHTHALMIC at 22:45

## 2025-03-12 RX ADMIN — ONDANSETRON 4 MG: 2 INJECTION, SOLUTION INTRAMUSCULAR; INTRAVENOUS at 07:50

## 2025-03-12 RX ADMIN — ACETAMINOPHEN 1000 MG: 500 TABLET ORAL at 06:16

## 2025-03-12 RX ADMIN — ROCURONIUM BROMIDE 10 MG: 10 INJECTION INTRAVENOUS at 07:39

## 2025-03-12 RX ADMIN — LIDOCAINE HYDROCHLORIDE 100 MG: 20 INJECTION, SOLUTION EPIDURAL; INFILTRATION; INTRACAUDAL; PERINEURAL at 07:39

## 2025-03-12 RX ADMIN — ROCURONIUM BROMIDE 30 MG: 10 INJECTION INTRAVENOUS at 08:07

## 2025-03-12 RX ADMIN — DIPHENHYDRAMINE HYDROCHLORIDE 12.5 MG: 50 INJECTION INTRAMUSCULAR; INTRAVENOUS at 12:05

## 2025-03-12 RX ADMIN — MIDAZOLAM HYDROCHLORIDE 2 MG: 1 INJECTION, SOLUTION INTRAMUSCULAR; INTRAVENOUS at 07:31

## 2025-03-12 RX ADMIN — HYDROMORPHONE HYDROCHLORIDE 1 MG: 2 INJECTION, SOLUTION INTRAMUSCULAR; INTRAVENOUS; SUBCUTANEOUS at 10:21

## 2025-03-12 RX ADMIN — ONDANSETRON 4 MG: 2 INJECTION, SOLUTION INTRAMUSCULAR; INTRAVENOUS at 11:29

## 2025-03-12 ASSESSMENT — PAIN SCALES - GENERAL
PAINLEVEL_OUTOF10: 8
PAINLEVEL_OUTOF10: 7
PAINLEVEL_OUTOF10: 0
PAINLEVEL_OUTOF10: 4
PAINLEVEL_OUTOF10: 9
PAINLEVEL_OUTOF10: 7
PAINLEVEL_OUTOF10: 7
PAINLEVEL_OUTOF10: 10
PAINLEVEL_OUTOF10: 10
PAINLEVEL_OUTOF10: 8

## 2025-03-12 ASSESSMENT — PAIN DESCRIPTION - LOCATION
LOCATION: ABDOMEN

## 2025-03-12 ASSESSMENT — PAIN DESCRIPTION - DESCRIPTORS
DESCRIPTORS: CRAMPING
DESCRIPTORS: SORE
DESCRIPTORS: SHARP
DESCRIPTORS: SORE
DESCRIPTORS: SHARP
DESCRIPTORS: CRAMPING
DESCRIPTORS: SHARP
DESCRIPTORS: CRAMPING;SORE

## 2025-03-12 ASSESSMENT — PAIN DESCRIPTION - ORIENTATION
ORIENTATION: MID
ORIENTATION: LOWER;MID
ORIENTATION: LOWER

## 2025-03-12 ASSESSMENT — PAIN - FUNCTIONAL ASSESSMENT: PAIN_FUNCTIONAL_ASSESSMENT: NONE - DENIES PAIN

## 2025-03-12 NOTE — ANESTHESIA PRE PROCEDURE
Department of Anesthesiology  Preprocedure Note       Name:  Maggie Lin   Age:  44 y.o.  :  1980                                          MRN:  [id number]         Date:  3/12/2025      Surgeon: Surgeon(s):  Chidi Cruz MD    Procedure: Procedure(s):  ROBOTIC ASSISTED LAPAROSCOPIC HIATAL HERNIA REPAIR WITH REVISION OF SLEEVE GASTRECTOMY TO GASTRIC BYPASS, ESOPHAGOGASTRODUODENOSCOPY (EGD) (E R A S)    Medications prior to admission:   Prior to Admission medications    Medication Sig Start Date End Date Taking? Authorizing Provider   budesonide-formoterol (SYMBICORT) 80-4.5 MCG/ACT AERO Inhale 2 puffs into the lungs 2 times daily   Yes Provider, MD Kath   dorzolamide (TRUSOPT) 2 % ophthalmic solution 1 drop 2 times daily 24 Yes Provider, MD Kath   latanoprost (XALATAN) 0.005 % ophthalmic solution 1 drop nightly   Yes Provider, MD Kath   BRIMONIDINE TARTRATE OP Apply to eye   Yes Provider, MD Kath   Multiple Vitamins-Minerals (THERAPEUTIC MULTIVITAMIN-MINERALS) tablet Take 1 tablet by mouth daily Bariatric MVI   Yes Provider, MD Kath   albuterol (PROVENTIL) (2.5 MG/3ML) 0.083% nebulizer solution 2.5 mg = 3 mL, Inhalation, every 6 hours, 3 Refills, Dispense: 1,080, mL, [facility] [id number]  Patient taking differently: Take 3 mLs by nebulization every 4 hours as needed for Shortness of Breath 2.5 mg = 3 mL, Inhalation, every 6 hours, 3 Refills, Dispense: 1,080, mL, [facility] [address] 9/15/23  Yes Patito Whitehead MD   cyanocobalamin 1000 MCG tablet Take 1 tablet by mouth daily   Yes Automatic Reconciliation, Ar   montelukast (SINGULAIR) 10 MG tablet Take 1 tablet by mouth daily 16  Yes Automatic Reconciliation, Ar   polyethylene glycol (GLYCOLAX) 17 GM/SCOOP powder Take 17 g by mouth daily  Patient not taking: Reported on 3/12/2025 2/19/25 5/20/25  Sabra Mike, APRN - NP   ondansetron (ZOFRAN) 4 MG tablet Take 1

## 2025-03-12 NOTE — H&P
Bariatric Surgery History and Physical    Subjective:     The patient is a 44 y.o. obese female scheduled for robotic assisted laparoscopic hiatal hernia repair with revision of sleeve gastrectomy to gastric bypass. She has a history of 2016 sleeve gastrectomy, with suboptimal weight loss results, and development of progressive, poorly controlled reflux symptoms despite escalation of her medical regimen.     Bariatric comorbidities present are   Past Medical History:   Diagnosis Date    Anemia     Anxiety     Arthritis     Asthma     [facility]    Cerebral artery occlusion with cerebral infarction (HCC) 2016    TIA, NO RESIDUAL    COPD (chronic obstructive pulmonary disease) (HCC)     Glaucoma     BILAT    Lung disease     Morbid obesity     Rash 2019    It happens when working out.    Sleep apnea     DOES NOT WEAR HER CPAP       Patient Active Problem List    Diagnosis Date Noted    Morbid obesity 01/30/2025    Anemia, unspecified 01/30/2025    Intrinsic asthma without status asthmaticus 01/30/2025    Esophageal reflux 01/30/2025    Hiatal hernia 06/03/2024    S/P myomectomy 12/21/2023    Class 3 severe obesity due to excess calories with serious comorbidity in adult 09/15/2023    Severe anemia 09/02/2023    Mild intermittent asthma 04/11/2023    Gastroesophageal reflux disease without esophagitis 04/11/2023    Allergic rhinitis 04/11/2023    Liver mass 06/21/2022    Mixed hyperlipidemia 10/19/2021    Iron deficiency anemia 08/12/2020    Osteoarthrosis, localized, primary, knee 09/20/2017    Obstructive sleep apnea 02/15/2012    Arthritis 02/15/2012    Asthma 09/08/2011    COPD (chronic obstructive pulmonary disease) (HCC) 09/08/2011     Past Medical History:   Diagnosis Date    Anemia     Anxiety     Arthritis     Asthma     [facility]    Cerebral artery occlusion with cerebral infarction (HCC) 2016    TIA, NO RESIDUAL    COPD (chronic obstructive pulmonary disease) (HCC)     Glaucoma     BILAT    Lung    BRIMONIDINE TARTRATE OP Apply to eye   Yes Provider, MD Kath   Multiple Vitamins-Minerals (THERAPEUTIC MULTIVITAMIN-MINERALS) tablet Take 1 tablet by mouth daily Bariatric MVI   Yes Provider, MD Kath   albuterol (PROVENTIL) (2.5 MG/3ML) 0.083% nebulizer solution 2.5 mg = 3 mL, Inhalation, every 6 hours, 3 Refills, Dispense: 1,080, mL, [facility] [address]  Patient taking differently: Take 3 mLs by nebulization every 4 hours as needed for Shortness of Breath 2.5 mg = 3 mL, Inhalation, every 6 hours, 3 Refills, Dispense: 1,080, mL, [facility] [address] 9/15/23  Yes Patito Whitehead MD   cyanocobalamin 1000 MCG tablet Take 1 tablet by mouth daily   Yes Automatic Reconciliation, Ar   montelukast (SINGULAIR) 10 MG tablet Take 1 tablet by mouth daily 5/11/16  Yes Automatic Reconciliation, Ar   polyethylene glycol (GLYCOLAX) 17 GM/SCOOP powder Take 17 g by mouth daily  Patient not taking: Reported on 3/12/2025 2/19/25 5/20/25  Sabra Mike APRN - NP   ondansetron (ZOFRAN) 4 MG tablet Take 1 tablet by mouth every 8 hours as needed for Nausea or Vomiting  Patient not taking: Reported on 3/12/2025 2/19/25   Sabra Mike APRN - NP   gabapentin (NEURONTIN) 100 MG capsule Take 1-2 capsules by mouth 3 times daily for 5 days. Max Daily Amount: 600 mg 2/19/25 2/24/25  Sabra Mike APRN - NP   enoxaparin (LOVENOX) 40 MG/0.4ML Inject 0.4 mLs into the skin daily AFTER SURGERY  Patient not taking: Reported on 3/12/2025 2/19/25   Sabra Mike APRN - NP   pantoprazole (PROTONIX) 40 MG tablet Take 1 tablet by mouth every morning (before breakfast)  Patient not taking: Reported on 3/12/2025 8/12/24   Patito Whitehead MD     No Known Allergies      Review of Systems:    Review of Systems - General ROS: positive for  - weight loss  Ophthalmic ROS: negative  ENT ROS: negative  Hematological and Lymphatic ROS: negative  Respiratory ROS: positive for - shortness of breath  negative for -

## 2025-03-12 NOTE — OP NOTE
[address]  [address]                            OPERATIVE REPORT      PATIENT NAME: CABRERA BAI              : 1980  MED REC NO: [id number]                       ROOM: [facility]  ACCOUNT NO: [id number]                       ADMIT DATE: 2025  PROVIDER: Chidi Cruz MD    DATE OF SERVICE:  2025    PREOPERATIVE DIAGNOSES:       1. Persistent morbid obesity.     2. Asthma.     3. Gastroesophageal reflux.     4. Hiatal hernia.    POSTOPERATIVE DIAGNOSES:       1. Persistent morbid obesity.     2. Asthma.     3. Gastroesophageal reflux.     4. Hiatal hernia.    PROCEDURES PERFORMED:       1. Robotic-assisted laparoscopic hiatal hernia repair.     2. Robotic-assisted laparoscopic revision of sleeve gastrectomy to gastric bypass.     3. Intraoperative upper endoscopy.    SURGEON:  Chidi Cruz MD    ASSISTANT:  LULA Sadler.    ANESTHESIA:  General endotracheal.    DRAINS:  19 mm Tamir drain.    SPONGE COUNT:  Correct.    NEEDLE COUNT:  Correct.    ESTIMATED BLOOD LOSS:  75 mL.    SPECIMENS REMOVED:  None.    COMPLICATIONS:  None.    IMPLANTS:  None.    INDICATIONS:  The patient is a 44-year-old  female with a history of morbid obesity, initially managed through laparoscopic sleeve gastrectomy with suboptimal weight loss results.  In addition to poor weight loss results, she has developed progressive reflux symptoms despite escalation of medical regimen.  Imaging reveals hiatal hernia with severe spontaneous reflux.  Antral gastritis is identified endoscopically.  Medical efforts at further weight loss, reflux control have been unsuccessful.  She is taken to the operating room today for robotic-assisted laparoscopic hernia repair with revision of sleeve gastrectomy to gastric bypass. I have asked LULA Sadler, to assist with the procedure given the technical complexity of reoperative minimally

## 2025-03-12 NOTE — PERIOP NOTE
TRANSFER - OUT REPORT:    Verbal report given to BRYSON Toussaint  being transferred to 509(unit) for routine progression of patient care       Report consisted of patient’s Situation, Background, Assessment and   Recommendations(SBAR).     Time Pre op antibiotic given:Ancef 3G and 500mg Flagyl at 7:50am  Anesthesia Stop time: 1041    Information from the following report(s) SBAR, OR Summary, Intake/Output, and MAR was reviewed with the receiving nurse.    Opportunity for questions and clarification was provided.     Is the patient on 02? Yes       L/Min 2       Other     Is the patient on a monitor? No    Is the nurse transporting with the patient? Yes    At transfer, are there Patient Belongings with the patient?  If Yes, please note/list: Belongings Bag    Surgical Waiting Area notified of patient's transfer from PACU? Yes

## 2025-03-12 NOTE — ANESTHESIA POSTPROCEDURE EVALUATION
Post-Anesthesia Evaluation and Assessment    Patient: Maggie Lin MRN: [id number]  SSN: [id number]    YOB: 1980  Age: 44 y.o.  Sex: female      I have evaluated the patient and they are stable and ready for discharge from the PACU.     Cardiovascular Function/Vital Signs  Visit Vitals  /67   Pulse 73   Temp 97.6 °F (36.4 °C) (Oral)   Resp 14   Wt (!) 140.2 kg (309 lb 1.4 oz)   SpO2 92%   BMI 53.05 kg/m²       Patient is status post General anesthesia for Procedure(s):  ROBOTIC ASSISTED LAPAROSCOPIC HIATAL HERNIA REPAIR WITH REVISION OF SLEEVE GASTRECTOMY TO GASTRIC BYPASS, ESOPHAGOGASTRODUODENOSCOPY (EGD) (E R A S).    Nausea/Vomiting: None    Postoperative hydration reviewed and adequate.    Pain:      Managed    Neurological Status:       At baseline    Mental Status, Level of Consciousness: Alert and  oriented to person, place, and time    Pulmonary Status:       Adequate oxygenation and airway patent    Complications related to anesthesia: None    Post-anesthesia assessment completed. No concerns    Signed By: Brigido Carpenter MD     March 12, 2025

## 2025-03-12 NOTE — BRIEF OP NOTE
Brief Postoperative Note      Patient: Maggie Lin  YOB: 1980  MRN: [id number]    Date of Procedure: 3/12/2025    Pre-Op Diagnosis Codes:      * Morbid obesity [E66.01]     * Anemia, unspecified [D64.9]     * Intrinsic asthma without status asthmaticus [J45.20]     * Esophageal reflux [K21.9]     * Hiatal hernia [K44.9]    Post-Op Diagnosis: Same       Procedure(s):  ROBOTIC ASSISTED LAPAROSCOPIC HIATAL HERNIA REPAIR WITH REVISION OF SLEEVE GASTRECTOMY TO GASTRIC BYPASS, ESOPHAGOGASTRODUODENOSCOPY (EGD) (E R A S)    Surgeon(s):  Chidi Cruz MD    Assistant:  Physician Assistant: Rianna Cisse PA    Anesthesia: General    Estimated Blood Loss (mL): 75 mL    Complications: None    Specimens:   * No specimens in log *    Implants:  Implant Name Type Inv. Item Serial No.  Lot No. LRB No. Used Action   STAPLE SKIN LN REINF 60 MM [employer] - [id number]  STAPLE SKIN LN REINF 60 MM [employer]  [employer] [id number] N/A 4 Implanted         Drains:   Closed/Suction Drain RUQ Bulb (Active)       Findings:  Infection Present At Time Of Surgery (PATOS) (choose all levels that have infection present):  No infection present  Other Findings: Hiatal hernia, reduced and repaired through posterior cruroplasty.  Creation of a 30 cc gastric pouch with 150 cm antecolic, antigastric Dawson limb.  Handsewn gastrojejunostomy.  No intraluminal hemorrhage or insufflation air leak on upper endoscopy.    Electronically signed by Chidi Cruz MD on 3/12/2025 at 10:45 AM

## 2025-03-13 LAB
ANION GAP SERPL CALC-SCNC: 1 MMOL/L (ref 2–12)
BASOPHILS # BLD: 0.01 K/UL (ref 0–0.1)
BASOPHILS NFR BLD: 0.1 % (ref 0–1)
BUN SERPL-MCNC: 9 MG/DL (ref 6–20)
BUN/CREAT SERPL: 16 (ref 12–20)
CALCIUM SERPL-MCNC: 10 MG/DL (ref 8.5–10.1)
CHLORIDE SERPL-SCNC: 108 MMOL/L (ref 97–108)
CO2 SERPL-SCNC: 26 MMOL/L (ref 21–32)
CREAT SERPL-MCNC: 0.56 MG/DL (ref 0.55–1.02)
DIFFERENTIAL METHOD BLD: ABNORMAL
EOSINOPHIL # BLD: 0 K/UL (ref 0–0.4)
EOSINOPHIL NFR BLD: 0 % (ref 0–7)
ERYTHROCYTE [DISTWIDTH] IN BLOOD BY AUTOMATED COUNT: 13.5 % (ref 11.5–14.5)
GLUCOSE SERPL-MCNC: 118 MG/DL (ref 65–100)
HCT VFR BLD AUTO: 38.7 % (ref 35–47)
HGB BLD-MCNC: 11.9 G/DL (ref 11.5–16)
IMM GRANULOCYTES # BLD AUTO: 0.03 K/UL (ref 0–0.04)
IMM GRANULOCYTES NFR BLD AUTO: 0.4 % (ref 0–0.5)
LYMPHOCYTES # BLD: 1.32 K/UL (ref 0.8–3.5)
LYMPHOCYTES NFR BLD: 17.1 % (ref 12–49)
MCH RBC QN AUTO: 27.7 PG (ref 26–34)
MCHC RBC AUTO-ENTMCNC: 30.7 G/DL (ref 30–36.5)
MCV RBC AUTO: 90.2 FL (ref 80–99)
MONOCYTES # BLD: 0.56 K/UL (ref 0–1)
MONOCYTES NFR BLD: 7.3 % (ref 5–13)
NEUTS SEG # BLD: 5.79 K/UL (ref 1.8–8)
NEUTS SEG NFR BLD: 75.1 % (ref 32–75)
NRBC # BLD: 0 K/UL (ref 0–0.01)
NRBC BLD-RTO: 0 PER 100 WBC
PLATELET # BLD AUTO: 217 K/UL (ref 150–400)
PMV BLD AUTO: 12.5 FL (ref 8.9–12.9)
POTASSIUM SERPL-SCNC: 4.8 MMOL/L (ref 3.5–5.1)
RBC # BLD AUTO: 4.29 M/UL (ref 3.8–5.2)
SODIUM SERPL-SCNC: 135 MMOL/L (ref 136–145)
WBC # BLD AUTO: 7.7 K/UL (ref 3.6–11)

## 2025-03-13 PROCEDURE — 2580000003 HC RX 258: Performed by: SURGERY

## 2025-03-13 PROCEDURE — 94760 N-INVAS EAR/PLS OXIMETRY 1: CPT

## 2025-03-13 PROCEDURE — 1100000000 HC RM PRIVATE

## 2025-03-13 PROCEDURE — 2700000000 HC OXYGEN THERAPY PER DAY

## 2025-03-13 PROCEDURE — 6360000002 HC RX W HCPCS: Performed by: SURGERY

## 2025-03-13 PROCEDURE — 6370000000 HC RX 637 (ALT 250 FOR IP): Performed by: SURGERY

## 2025-03-13 PROCEDURE — 80048 BASIC METABOLIC PNL TOTAL CA: CPT

## 2025-03-13 PROCEDURE — 2500000003 HC RX 250 WO HCPCS: Performed by: SURGERY

## 2025-03-13 PROCEDURE — 85025 COMPLETE CBC W/AUTO DIFF WBC: CPT

## 2025-03-13 RX ORDER — MONTELUKAST SODIUM 10 MG/1
10 TABLET ORAL DAILY
Status: DISCONTINUED | OUTPATIENT
Start: 2025-03-13 | End: 2025-03-15 | Stop reason: HOSPADM

## 2025-03-13 RX ORDER — KETOROLAC TROMETHAMINE 30 MG/ML
15 INJECTION, SOLUTION INTRAMUSCULAR; INTRAVENOUS EVERY 6 HOURS
Status: COMPLETED | OUTPATIENT
Start: 2025-03-13 | End: 2025-03-14

## 2025-03-13 RX ORDER — HYDROMORPHONE HYDROCHLORIDE 4 MG/1
2 TABLET ORAL EVERY 4 HOURS PRN
Refills: 0 | Status: DISCONTINUED | OUTPATIENT
Start: 2025-03-13 | End: 2025-03-15 | Stop reason: HOSPADM

## 2025-03-13 RX ADMIN — PANTOPRAZOLE SODIUM 40 MG: 40 TABLET, DELAYED RELEASE ORAL at 09:39

## 2025-03-13 RX ADMIN — HYDROMORPHONE HYDROCHLORIDE 2 MG: 4 TABLET ORAL at 18:37

## 2025-03-13 RX ADMIN — ACETAMINOPHEN 650 MG: 650 SOLUTION ORAL at 18:37

## 2025-03-13 RX ADMIN — KETOROLAC TROMETHAMINE 15 MG: 30 INJECTION, SOLUTION INTRAMUSCULAR at 17:15

## 2025-03-13 RX ADMIN — ENOXAPARIN SODIUM 40 MG: 40 INJECTION SUBCUTANEOUS at 09:39

## 2025-03-13 RX ADMIN — ACETAMINOPHEN 650 MG: 650 SOLUTION ORAL at 06:27

## 2025-03-13 RX ADMIN — Medication 125 MG: at 21:30

## 2025-03-13 RX ADMIN — HYDROMORPHONE HYDROCHLORIDE 2 MG: 4 TABLET ORAL at 11:15

## 2025-03-13 RX ADMIN — MONTELUKAST 10 MG: 10 TABLET, FILM COATED ORAL at 09:39

## 2025-03-13 RX ADMIN — Medication 125 MG: at 14:32

## 2025-03-13 RX ADMIN — SODIUM CHLORIDE, PRESERVATIVE FREE 10 ML: 5 INJECTION INTRAVENOUS at 21:32

## 2025-03-13 RX ADMIN — KETOROLAC TROMETHAMINE 15 MG: 30 INJECTION, SOLUTION INTRAMUSCULAR at 21:28

## 2025-03-13 RX ADMIN — SODIUM CHLORIDE, POTASSIUM CHLORIDE, SODIUM LACTATE AND CALCIUM CHLORIDE: 600; 310; 30; 20 INJECTION, SOLUTION INTRAVENOUS at 05:22

## 2025-03-13 RX ADMIN — KETOROLAC TROMETHAMINE 15 MG: 30 INJECTION, SOLUTION INTRAMUSCULAR at 11:13

## 2025-03-13 RX ADMIN — HYDROMORPHONE HYDROCHLORIDE 1 MG: 1 INJECTION, SOLUTION INTRAMUSCULAR; INTRAVENOUS; SUBCUTANEOUS at 08:01

## 2025-03-13 RX ADMIN — SODIUM CHLORIDE, POTASSIUM CHLORIDE, SODIUM LACTATE AND CALCIUM CHLORIDE: 600; 310; 30; 20 INJECTION, SOLUTION INTRAVENOUS at 21:35

## 2025-03-13 RX ADMIN — HYDROMORPHONE HYDROCHLORIDE 1 MG: 1 INJECTION, SOLUTION INTRAMUSCULAR; INTRAVENOUS; SUBCUTANEOUS at 03:58

## 2025-03-13 RX ADMIN — LATANOPROST 1 DROP: 50 SOLUTION OPHTHALMIC at 21:29

## 2025-03-13 RX ADMIN — ACETAMINOPHEN 650 MG: 650 SOLUTION ORAL at 12:34

## 2025-03-13 RX ADMIN — Medication 125 MG: at 06:27

## 2025-03-13 RX ADMIN — KETOROLAC TROMETHAMINE 15 MG: 30 INJECTION, SOLUTION INTRAMUSCULAR at 05:17

## 2025-03-13 RX ADMIN — DORZOLAMIDE HYDROCHLORIDE 1 DROP: 20 SOLUTION/ DROPS OPHTHALMIC at 09:45

## 2025-03-13 ASSESSMENT — PAIN DESCRIPTION - LOCATION
LOCATION: ABDOMEN

## 2025-03-13 ASSESSMENT — PAIN SCALES - GENERAL
PAINLEVEL_OUTOF10: 8
PAINLEVEL_OUTOF10: 8
PAINLEVEL_OUTOF10: 6
PAINLEVEL_OUTOF10: 7
PAINLEVEL_OUTOF10: 10
PAINLEVEL_OUTOF10: 8
PAINLEVEL_OUTOF10: 8
PAINLEVEL_OUTOF10: 7
PAINLEVEL_OUTOF10: 8
PAINLEVEL_OUTOF10: 8

## 2025-03-13 ASSESSMENT — PAIN DESCRIPTION - DESCRIPTORS
DESCRIPTORS: ACHING
DESCRIPTORS: SHARP
DESCRIPTORS: DISCOMFORT
DESCRIPTORS: ACHING;DISCOMFORT
DESCRIPTORS: SHOOTING
DESCRIPTORS: ACHING;DISCOMFORT
DESCRIPTORS: ACHING

## 2025-03-13 ASSESSMENT — PAIN DESCRIPTION - ORIENTATION
ORIENTATION: RIGHT
ORIENTATION: LEFT
ORIENTATION: LOWER;RIGHT;LEFT

## 2025-03-13 NOTE — PROGRESS NOTES
TRANSFER - IN REPORT:    Verbal report received from Neeta Dover on Maggie Lin  being received from PACU for routine post-op      Report consisted of patient's Situation, Background, Assessment and   Recommendations(SBAR).     Information from the following report(s) Nurse Handoff Report and Surgery Report was reviewed with the receiving nurse.    Opportunity for questions and clarification was provided.      Assessment completed upon patient's arrival to unit and care assumed.

## 2025-03-13 NOTE — CONSULTS
NUTRITION       Chart reviewed. Post-op bariatric diet instruction completed. Will gladly follow up for additional questions as needed. Pt tolerating liquids today. Thank you.     Modesta Bernal RD [facility]

## 2025-03-13 NOTE — DISCHARGE INSTRUCTIONS
[facility] [place]  Bariatric Surgery Discharge Instructions     Procedure  : Laparoscopic revision of sleeve gastrectomy to gastric bypass    Future Appointments   Date Time Provider Department Center   3/26/2025  9:00 AM Sabra Mike APRN - NP [facility] AMB   4/9/2025  8:20 AM Sabra Mike APRN - NP [facility] AMB   4/23/2025  7:40 AM Sabra Mike APRN - NP [facility] AMB   8/4/2025  1:00 PM [facility]         Contact Information:    [facility] at [address], [facility] [address]   [place] [address]  [phone]    After Hours and Weekends  [phone] On Call Surgeon    Non Emergent Medical Needs  Call during office hours or send a message via My Chart   (messages returned during business hours)    DIET    Please remember that you are on ONLY LIQUIDS for the first 2 weeks after surgery.     Do not advance to the next phase until advised by your surgeon or Nurse Practitioner.   Refer to the Bariatric Handbook for detailed information.     TO PREVENT DEHYDRATION:  consume 48-64 ounces of liquids daily.  At least 48 ounces of that should come from water, Crystal Light, sugar free popsicles, sugar free gelatin or other calorie-free, sugar-free, caffeine free and noncarbonated beverages. Do not drink with a straw.   Sip, sip, sip throughout the day  Main priority is to stay hydrated  Aim for 60 grams of protein every day.  Most of your protein will come from shakes.  Refer to the Bariatric Handbook for detailed information.  Add additional protein supplements to meet protein needs (protein powder, clear protein such as protein water, non-fat dry milk powder, NO protein bars at this at this stage)     MEDICATIONS & VITAMINS    Pre-surgery medications should be reviewed with your Bariatric provider and taken as prescribed   Take no more than 2 pills at a time and wait 15-20 minutes

## 2025-03-13 NOTE — PROGRESS NOTES
Department of General Surgery - Adult  Surgical Service   Attending Progress Note      SUBJECTIVE: Good pain control; she is out of bed in chair; she has started bariatric liquids; she has ambulated in the halls.  No nausea or vomiting.    OBJECTIVE      Physical    VITALS:  /69   Pulse 59   Temp 97.9 °F (36.6 °C)   Resp 16   Wt (!) 141.5 kg (312 lb)   LMP 02/24/2025 (Exact Date)   SpO2 97%   BMI 53.55 kg/m²   INTAKE/OUTPUT:    Intake/Output Summary (Last 24 hours) at 3/13/2025 1105  Last data filed at 3/13/2025 0839  Gross per 24 hour   Intake --   Output 1545 ml   Net -1545 ml     ABDOMEN: Obese, nondistended, soft.  Laparoscopic wounds dry and intact.  Serosanguineous drain fluid.  Appropriate incisional pain with palpation.    Data  CBC with Differential:    Lab Results   Component Value Date/Time    WBC 7.7 03/13/2025 02:26 AM    RBC 4.29 03/13/2025 02:26 AM    HGB 11.9 03/13/2025 02:26 AM    HCT 38.7 03/13/2025 02:26 AM     03/13/2025 02:26 AM    MCV 90.2 03/13/2025 02:26 AM    MCH 27.7 03/13/2025 02:26 AM    MCHC 30.7 03/13/2025 02:26 AM    RDW 13.5 03/13/2025 02:26 AM    NRBC 0.0 03/13/2025 02:26 AM    NRBC 0.00 03/13/2025 02:26 AM    LYMPHOPCT 17.1 03/13/2025 02:26 AM    MONOPCT 7.3 03/13/2025 02:26 AM    EOSPCT 0.0 03/13/2025 02:26 AM    BASOPCT 0.1 03/13/2025 02:26 AM    MONOSABS 0.56 03/13/2025 02:26 AM    LYMPHSABS 1.32 03/13/2025 02:26 AM    EOSABS 0.00 03/13/2025 02:26 AM    BASOSABS 0.01 03/13/2025 02:26 AM    DIFFTYPE AUTOMATED 03/13/2025 02:26 AM     BMP:    Lab Results   Component Value Date/Time     03/13/2025 02:26 AM    K 4.8 03/13/2025 02:26 AM     03/13/2025 02:26 AM    CO2 26 03/13/2025 02:26 AM    BUN 9 03/13/2025 02:26 AM    CREATININE 0.56 03/13/2025 02:26 AM    CALCIUM 10.0 03/13/2025 02:26 AM    GFRAA >60 02/18/2022 01:31 PM    LABGLOM >90 03/13/2025 02:26 AM    LABGLOM >60 12/14/2023 10:23 AM    LABGLOM >60 04/26/2023 11:54 AM    LABGLOM 118 06/06/2022  10:11 AM    GLUCOSE 118 03/13/2025 02:26 AM    GLUCOSE 74 06/03/2024 03:10 PM       Current Inpatient Medications    Current Facility-Administered Medications: HYDROmorphone (DILAUDID) tablet 2 mg, 2 mg, Oral, Q4H PRN  ketorolac (TORADOL) injection 15 mg, 15 mg, IntraVENous, Q6H  montelukast (SINGULAIR) tablet 10 mg, 10 mg, Oral, Daily  dorzolamide (TRUSOPT) 2 % ophthalmic solution 1 drop, 1 drop, Both Eyes, BID  latanoprost (XALATAN) 0.005 % ophthalmic solution 1 drop, 1 drop, Both Eyes, Nightly  sodium chloride flush 0.9 % injection 5-40 mL, 5-40 mL, IntraVENous, 2 times per day  sodium chloride flush 0.9 % injection 5-40 mL, 5-40 mL, IntraVENous, PRN  0.9 % sodium chloride infusion, , IntraVENous, PRN  ondansetron (ZOFRAN-ODT) disintegrating tablet 4 mg, 4 mg, Oral, Q8H PRN **OR** ondansetron (ZOFRAN) injection 4 mg, 4 mg, IntraVENous, Q6H PRN  lactated ringers infusion, , IntraVENous, Continuous  HYDROmorphone HCl PF (DILAUDID) injection 1 mg, 1 mg, IntraVENous, Q3H PRN  pantoprazole (PROTONIX) tablet 40 mg, 40 mg, Oral, Daily  enoxaparin (LOVENOX) injection 40 mg, 40 mg, SubCUTAneous, Daily  LORazepam (ATIVAN) 2 MG/ML concentrated solution 1 mg, 1 mg, Oral, Q8H PRN  acetaminophen (TYLENOL) 160 MG/5ML solution 650 mg, 650 mg, Oral, 4 times per day  gabapentin (NEURONTIN) 250 MG/5ML solution 125 mg, 125 mg, Oral, 3 times per day    ASSESSMENT AND PLAN    44 y.o. female status post robotic assisted laparoscopic hiatal hernia repair with revision of sleeve gastrectomy to gastric bypass, post op day #  1.    Bariatric liquids-goal 4 ounces per hour.  Oral analgesics, PTA medications; start scheduled IV Toradol.  Ambulation each 2-3 hours; out of bed in chair.  Spirometry each 10 minutes.  DVT prophylaxis; patient teaching for home Lovenox administration.

## 2025-03-14 LAB
ANION GAP SERPL CALC-SCNC: 2 MMOL/L (ref 2–12)
BASOPHILS # BLD: 0.01 K/UL (ref 0–0.1)
BASOPHILS NFR BLD: 0.2 % (ref 0–1)
BUN SERPL-MCNC: 6 MG/DL (ref 6–20)
BUN/CREAT SERPL: 13 (ref 12–20)
CALCIUM SERPL-MCNC: 9.5 MG/DL (ref 8.5–10.1)
CHLORIDE SERPL-SCNC: 110 MMOL/L (ref 97–108)
CO2 SERPL-SCNC: 25 MMOL/L (ref 21–32)
CREAT SERPL-MCNC: 0.47 MG/DL (ref 0.55–1.02)
DIFFERENTIAL METHOD BLD: NORMAL
EOSINOPHIL # BLD: 0.05 K/UL (ref 0–0.4)
EOSINOPHIL NFR BLD: 1 % (ref 0–7)
ERYTHROCYTE [DISTWIDTH] IN BLOOD BY AUTOMATED COUNT: 13.4 % (ref 11.5–14.5)
GLUCOSE SERPL-MCNC: 84 MG/DL (ref 65–100)
HCT VFR BLD AUTO: 36.4 % (ref 35–47)
HGB BLD-MCNC: 11.7 G/DL (ref 11.5–16)
IMM GRANULOCYTES # BLD AUTO: 0.01 K/UL (ref 0–0.04)
IMM GRANULOCYTES NFR BLD AUTO: 0.2 % (ref 0–0.5)
LYMPHOCYTES # BLD: 1.76 K/UL (ref 0.8–3.5)
LYMPHOCYTES NFR BLD: 34 % (ref 12–49)
MCH RBC QN AUTO: 28.5 PG (ref 26–34)
MCHC RBC AUTO-ENTMCNC: 32.1 G/DL (ref 30–36.5)
MCV RBC AUTO: 88.8 FL (ref 80–99)
MONOCYTES # BLD: 0.45 K/UL (ref 0–1)
MONOCYTES NFR BLD: 8.7 % (ref 5–13)
NEUTS SEG # BLD: 2.89 K/UL (ref 1.8–8)
NEUTS SEG NFR BLD: 55.9 % (ref 32–75)
NRBC # BLD: 0 K/UL (ref 0–0.01)
NRBC BLD-RTO: 0 PER 100 WBC
PLATELET # BLD AUTO: 188 K/UL (ref 150–400)
PMV BLD AUTO: 11.7 FL (ref 8.9–12.9)
POTASSIUM SERPL-SCNC: 3.5 MMOL/L (ref 3.5–5.1)
RBC # BLD AUTO: 4.1 M/UL (ref 3.8–5.2)
SODIUM SERPL-SCNC: 137 MMOL/L (ref 136–145)
WBC # BLD AUTO: 5.2 K/UL (ref 3.6–11)

## 2025-03-14 PROCEDURE — 80048 BASIC METABOLIC PNL TOTAL CA: CPT

## 2025-03-14 PROCEDURE — 85025 COMPLETE CBC W/AUTO DIFF WBC: CPT

## 2025-03-14 PROCEDURE — 6370000000 HC RX 637 (ALT 250 FOR IP): Performed by: SURGERY

## 2025-03-14 PROCEDURE — 6360000002 HC RX W HCPCS: Performed by: SURGERY

## 2025-03-14 PROCEDURE — 1100000000 HC RM PRIVATE

## 2025-03-14 PROCEDURE — 2580000003 HC RX 258: Performed by: SURGERY

## 2025-03-14 RX ADMIN — ACETAMINOPHEN 650 MG: 650 SOLUTION ORAL at 06:07

## 2025-03-14 RX ADMIN — ACETAMINOPHEN 650 MG: 650 SOLUTION ORAL at 22:22

## 2025-03-14 RX ADMIN — SODIUM CHLORIDE, POTASSIUM CHLORIDE, SODIUM LACTATE AND CALCIUM CHLORIDE: 600; 310; 30; 20 INJECTION, SOLUTION INTRAVENOUS at 22:40

## 2025-03-14 RX ADMIN — ACETAMINOPHEN 650 MG: 650 SOLUTION ORAL at 17:25

## 2025-03-14 RX ADMIN — SODIUM CHLORIDE, POTASSIUM CHLORIDE, SODIUM LACTATE AND CALCIUM CHLORIDE: 600; 310; 30; 20 INJECTION, SOLUTION INTRAVENOUS at 14:47

## 2025-03-14 RX ADMIN — MONTELUKAST 10 MG: 10 TABLET, FILM COATED ORAL at 08:25

## 2025-03-14 RX ADMIN — ACETAMINOPHEN 650 MG: 650 SOLUTION ORAL at 13:52

## 2025-03-14 RX ADMIN — Medication 125 MG: at 06:07

## 2025-03-14 RX ADMIN — KETOROLAC TROMETHAMINE 15 MG: 30 INJECTION, SOLUTION INTRAMUSCULAR at 06:07

## 2025-03-14 RX ADMIN — ENOXAPARIN SODIUM 40 MG: 40 INJECTION SUBCUTANEOUS at 08:25

## 2025-03-14 RX ADMIN — PANTOPRAZOLE SODIUM 40 MG: 40 TABLET, DELAYED RELEASE ORAL at 08:25

## 2025-03-14 RX ADMIN — DORZOLAMIDE HYDROCHLORIDE 1 DROP: 20 SOLUTION/ DROPS OPHTHALMIC at 08:25

## 2025-03-14 RX ADMIN — SODIUM CHLORIDE, POTASSIUM CHLORIDE, SODIUM LACTATE AND CALCIUM CHLORIDE: 600; 310; 30; 20 INJECTION, SOLUTION INTRAVENOUS at 06:14

## 2025-03-14 RX ADMIN — Medication 125 MG: at 13:52

## 2025-03-14 RX ADMIN — LATANOPROST 1 DROP: 50 SOLUTION OPHTHALMIC at 22:25

## 2025-03-14 RX ADMIN — HYDROMORPHONE HYDROCHLORIDE 2 MG: 4 TABLET ORAL at 22:22

## 2025-03-14 RX ADMIN — Medication 125 MG: at 22:22

## 2025-03-14 ASSESSMENT — PAIN DESCRIPTION - LOCATION: LOCATION: ABDOMEN

## 2025-03-14 ASSESSMENT — PAIN DESCRIPTION - DESCRIPTORS: DESCRIPTORS: ACHING

## 2025-03-14 ASSESSMENT — PAIN SCALES - GENERAL
PAINLEVEL_OUTOF10: 7
PAINLEVEL_OUTOF10: 7

## 2025-03-14 ASSESSMENT — PAIN DESCRIPTION - ORIENTATION: ORIENTATION: RIGHT;LEFT;ANTERIOR

## 2025-03-14 NOTE — PROGRESS NOTES
Spiritual Care Partner Volunteer visited patient at [facility] in [facility] 5E1 SURGICAL UNIT on 3/14/2025   Documented by:  Eyal Moss MDIV, BCC

## 2025-03-14 NOTE — PROGRESS NOTES
LABGLOM >60 04/26/2023 11:54 AM    LABGLOM 118 06/06/2022 10:11 AM    GLUCOSE 84 03/14/2025 01:47 AM    GLUCOSE 74 06/03/2024 03:10 PM       Current Inpatient Medications    Current Facility-Administered Medications: HYDROmorphone (DILAUDID) tablet 2 mg, 2 mg, Oral, Q4H PRN  montelukast (SINGULAIR) tablet 10 mg, 10 mg, Oral, Daily  dorzolamide (TRUSOPT) 2 % ophthalmic solution 1 drop, 1 drop, Both Eyes, BID  latanoprost (XALATAN) 0.005 % ophthalmic solution 1 drop, 1 drop, Both Eyes, Nightly  sodium chloride flush 0.9 % injection 5-40 mL, 5-40 mL, IntraVENous, 2 times per day  sodium chloride flush 0.9 % injection 5-40 mL, 5-40 mL, IntraVENous, PRN  0.9 % sodium chloride infusion, , IntraVENous, PRN  ondansetron (ZOFRAN-ODT) disintegrating tablet 4 mg, 4 mg, Oral, Q8H PRN **OR** ondansetron (ZOFRAN) injection 4 mg, 4 mg, IntraVENous, Q6H PRN  lactated ringers infusion, , IntraVENous, Continuous  HYDROmorphone HCl PF (DILAUDID) injection 1 mg, 1 mg, IntraVENous, Q3H PRN  pantoprazole (PROTONIX) tablet 40 mg, 40 mg, Oral, Daily  enoxaparin (LOVENOX) injection 40 mg, 40 mg, SubCUTAneous, Daily  LORazepam (ATIVAN) 2 MG/ML concentrated solution 1 mg, 1 mg, Oral, Q8H PRN  acetaminophen (TYLENOL) 160 MG/5ML solution 650 mg, 650 mg, Oral, 4 times per day  gabapentin (NEURONTIN) 250 MG/5ML solution 125 mg, 125 mg, Oral, 3 times per day    ASSESSMENT AND PLAN    44 y.o. female status post robotic assisted laparoscopic hiatal hernia repair with revision of sleeve gastrectomy to gastric bypass, post op day #  2. Overall doing well, but oral intake not yet consistent to proceed with discharge.    Bariatric liquids-goal 4 ounces per hour.  Oral analgesics, PTA medications; continue scheduled IV Toradol.  Ambulation each 2-3 hours; out of bed in chair.  Spirometry each 10 minutes.  DVT prophylaxis.  Reassess later today for possible discharge to home.

## 2025-03-15 VITALS
SYSTOLIC BLOOD PRESSURE: 148 MMHG | WEIGHT: 293 LBS | DIASTOLIC BLOOD PRESSURE: 91 MMHG | BODY MASS INDEX: 53.55 KG/M2 | RESPIRATION RATE: 20 BRPM | HEART RATE: 81 BPM | TEMPERATURE: 99 F | OXYGEN SATURATION: 99 %

## 2025-03-15 PROCEDURE — 94760 N-INVAS EAR/PLS OXIMETRY 1: CPT

## 2025-03-15 PROCEDURE — 94640 AIRWAY INHALATION TREATMENT: CPT

## 2025-03-15 PROCEDURE — 2500000003 HC RX 250 WO HCPCS: Performed by: SURGERY

## 2025-03-15 PROCEDURE — 6370000000 HC RX 637 (ALT 250 FOR IP): Performed by: SURGERY

## 2025-03-15 PROCEDURE — 6360000002 HC RX W HCPCS: Performed by: SURGERY

## 2025-03-15 RX ORDER — DOCUSATE SODIUM 100 MG/1
100 CAPSULE, LIQUID FILLED ORAL 2 TIMES DAILY
Qty: 30 CAPSULE | Refills: 0 | Status: SHIPPED | OUTPATIENT
Start: 2025-03-15

## 2025-03-15 RX ORDER — HYDROMORPHONE HYDROCHLORIDE 2 MG/1
2 TABLET ORAL EVERY 6 HOURS PRN
Qty: 25 TABLET | Refills: 0 | Status: SHIPPED | OUTPATIENT
Start: 2025-03-15 | End: 2025-03-22

## 2025-03-15 RX ORDER — ALBUTEROL SULFATE 0.83 MG/ML
2.5 SOLUTION RESPIRATORY (INHALATION) EVERY 4 HOURS PRN
Status: DISCONTINUED | OUTPATIENT
Start: 2025-03-15 | End: 2025-03-15 | Stop reason: HOSPADM

## 2025-03-15 RX ADMIN — ALBUTEROL SULFATE 2.5 MG: 2.5 SOLUTION RESPIRATORY (INHALATION) at 12:01

## 2025-03-15 RX ADMIN — MONTELUKAST 10 MG: 10 TABLET, FILM COATED ORAL at 10:20

## 2025-03-15 RX ADMIN — ACETAMINOPHEN 650 MG: 650 SOLUTION ORAL at 12:57

## 2025-03-15 RX ADMIN — Medication 125 MG: at 13:53

## 2025-03-15 RX ADMIN — PANTOPRAZOLE SODIUM 40 MG: 40 TABLET, DELAYED RELEASE ORAL at 10:20

## 2025-03-15 RX ADMIN — SODIUM CHLORIDE, PRESERVATIVE FREE 10 ML: 5 INJECTION INTRAVENOUS at 10:23

## 2025-03-15 RX ADMIN — DORZOLAMIDE HYDROCHLORIDE 1 DROP: 20 SOLUTION/ DROPS OPHTHALMIC at 10:23

## 2025-03-15 RX ADMIN — Medication 125 MG: at 05:16

## 2025-03-15 RX ADMIN — ENOXAPARIN SODIUM 40 MG: 40 INJECTION SUBCUTANEOUS at 10:20

## 2025-03-15 RX ADMIN — ACETAMINOPHEN 650 MG: 650 SOLUTION ORAL at 05:16

## 2025-03-15 ASSESSMENT — PAIN SCALES - GENERAL: PAINLEVEL_OUTOF10: 3

## 2025-03-15 NOTE — PLAN OF CARE
Problem: Chronic Conditions and Co-morbidities  Goal: Patient's chronic conditions and co-morbidity symptoms are monitored and maintained or improved  Outcome: Adequate for Discharge     Problem: Pain  Goal: Verbalizes/displays adequate comfort level or baseline comfort level  Outcome: Adequate for Discharge     Problem: Discharge Planning  Goal: Discharge to home or other facility with appropriate resources  Outcome: Adequate for Discharge     Problem: Safety - Adult  Goal: Free from fall injury  Outcome: Adequate for Discharge

## 2025-03-15 NOTE — PROGRESS NOTES
1000:  Discharge instruction reviewed with patient.  Patient voiced her family will pick her up this afternoon.    About 1610; family arrived to  patient for discharge.  Pt had packed her belongings, ie tennis shoes, \"apple\" headphone, cellphone/, clothing, etc.

## 2025-03-15 NOTE — PROGRESS NOTES
General Surgery Progress Note    3 Days Post-Op   ROBOTIC ASSISTED LAPAROSCOPIC HIATAL HERNIA REPAIR WITH REVISION OF SLEEVE GASTRECTOMY TO GASTRIC BYPASS, ESOPHAGOGASTRODUODENOSCOPY (EGD) (E R A S) (Abdomen)   Date:3/15/2025       Room:[facility]  Patient Name:Maggie Lin     YOB: 1980     Age:44 y.o.    Subjective     No acute surgical issues.  Pt is sitting in chair.  Tolerating liquids.  She has incisional pain with ice pack to help alleviate pain.   PAZ with light serosanguinous drainage    Medications   Scheduled Meds:    montelukast  10 mg Oral Daily    dorzolamide  1 drop Both Eyes BID    latanoprost  1 drop Both Eyes Nightly    sodium chloride flush  5-40 mL IntraVENous 2 times per day    pantoprazole  40 mg Oral Daily    enoxaparin  40 mg SubCUTAneous Daily    acetaminophen  650 mg Oral 4 times per day    gabapentin  125 mg Oral 3 times per day     Continuous Infusions:    sodium chloride      lactated ringers 125 mL/hr at 25 2240     PRN Meds: HYDROmorphone, sodium chloride flush, sodium chloride, ondansetron **OR** ondansetron, HYDROmorphone, LORazepam        Physical Examination      Vitals:  /80   Pulse 65   Temp 98.4 °F (36.9 °C) (Oral)   Resp 18   Wt (!) 141.5 kg (312 lb)   LMP 2025 (Exact Date)   SpO2 98%   BMI 53.55 kg/m²   Temp (24hrs), Av.8 °F (37.1 °C), Min:98.4 °F (36.9 °C), Max:99.3 °F (37.4 °C)      Physical Exam:    Gen:  No apparent distress  Neuro:  Alert and oriented x4  Pulm:  Unlabored  Abd:  Soft/non-distended/appropriate tenderness to palpation without guarding or rebound  Ext:  No cyanosis, clubbing or edema  Wound:  C/D/I    I/O (24Hr):    Intake/Output Summary (Last 24 hours) at 3/15/2025 0918  Last data filed at 3/15/2025 0240  Gross per 24 hour   Intake 600 ml   Output 500 ml   Net 100 ml         Labs/Imaging/Diagnostics   Labs:  CBC:  Recent Labs     25  0226 25  0147   WBC 7.7 5.2   RBC 4.29 4.10   HGB 11.9 11.7   HCT 38.7  36.4   MCV 90.2 88.8   RDW 13.5 13.4    188     CHEMISTRIES:  Recent Labs     03/13/25  0226 03/14/25  0147   * 137   K 4.8 3.5    110*   CO2 26 25   BUN 9 6   CREATININE 0.56 0.47*   GLUCOSE 118* 84     PT/INR:No results for input(s): \"PROTIME\", \"INR\" in the last 72 hours.  APTT:No results for input(s): \"APTT\" in the last 72 hours.  LIVER PROFILE:No results for input(s): \"AST\", \"ALT\", \"BILIDIR\", \"BILITOT\", \"ALKPHOS\" in the last 72 hours.    Imaging Last 24 Hours:  No results found.      Assessment        Hospital Problems           Last Modified POA    * (Principal) Morbid obesity 3/13/2025 Yes    Hiatal hernia 3/13/2025 Yes    Anemia, unspecified 3/13/2025 Yes    Intrinsic asthma without status asthmaticus 3/13/2025 Yes    Esophageal reflux 3/13/2025 Yes        Plan:        - Bariatric liquids  - Pain control  - Out of bed.  Encourage ambulation  - DC PAZ drain  - Plan DC home this afternoon if pain is controlled and she is tolerating liquids    Electronically signed by Toya Richards MD on 3/15/25 at 9:18 AM EDT     1.85

## 2025-03-17 ENCOUNTER — TELEPHONE (OUTPATIENT)
Age: 45
End: 2025-03-17

## 2025-03-17 NOTE — TELEPHONE ENCOUNTER
I called the patient back after speaking with [facility] pharmacist and I told her that her insurance company requires she be prescribed Narcan as well which the pharmacist said she can do by their protocol. A prior auth is not needed. I also told the patient the cash price is $19.99, she said they never offered her that I told her to check in a little bit to see if her RX for Dilaudid I ready. Pt in agreement.  
3/26/25      Red Flags = prompt referral to a bariatric team provider for follow up    Fever > 101  Vomiting and not tolerating liquids   Weak and dizzy / lightheaded   Dark urine   Abdominal pain despite medication, splinting, ice or heat   SOB  Calf swelling and or redness   Chest pain   Additional Comments: ____________________________________________________________    If more than one parameter is not met or considered poor, nurse needs to discuss with provider recommend for patient to be seen in the office as soon as possible or refer to the provider for follow-up.  Reinforce to patient to use bariatric educational booklet as guide.  It is appropriate to refer patient to the nutritionist to discuss more in detail of diet and nutrition.

## 2025-03-19 ENCOUNTER — TELEPHONE (OUTPATIENT)
Age: 45
End: 2025-03-19

## 2025-03-19 NOTE — TELEPHONE ENCOUNTER
Returned call to patient.  Two patient identifiers used.  Informed pt that per NP it is ok to start taking the bariatric vitamins. Pt verbalized understanding and thanked for the call.

## 2025-03-26 ENCOUNTER — OFFICE VISIT (OUTPATIENT)
Age: 45
End: 2025-03-26

## 2025-03-26 VITALS
TEMPERATURE: 97.7 F | OXYGEN SATURATION: 97 % | WEIGHT: 290 LBS | DIASTOLIC BLOOD PRESSURE: 85 MMHG | RESPIRATION RATE: 14 BRPM | HEART RATE: 82 BPM | SYSTOLIC BLOOD PRESSURE: 129 MMHG | BODY MASS INDEX: 49.51 KG/M2 | HEIGHT: 64 IN

## 2025-03-26 DIAGNOSIS — Z09 SURGICAL FOLLOW-UP CARE: ICD-10-CM

## 2025-03-26 DIAGNOSIS — E66.01 MORBID OBESITY: Primary | ICD-10-CM

## 2025-03-26 RX ORDER — CYCLOBENZAPRINE HCL 10 MG
5-10 TABLET ORAL 3 TIMES DAILY PRN
Qty: 10 TABLET | Refills: 0 | Status: SHIPPED | OUTPATIENT
Start: 2025-03-26 | End: 2025-04-05

## 2025-03-26 ASSESSMENT — PATIENT HEALTH QUESTIONNAIRE - PHQ9
SUM OF ALL RESPONSES TO PHQ QUESTIONS 1-9: 0
2. FEELING DOWN, DEPRESSED OR HOPELESS: NOT AT ALL
1. LITTLE INTEREST OR PLEASURE IN DOING THINGS: NOT AT ALL

## 2025-03-26 NOTE — PROGRESS NOTES
2 weeks s/p ROBOTIC ASSISTED LAPAROSCOPIC HIATAL HERNIA REPAIR WITH REVISION OF SLEEVE GASTRECTOMY TO GASTRIC BYPASS, ESOPHAGOGASTRODUODENOSCOPY       Pt reports doing well on liquids .   She complains of some soreness on the right side incision. She has some discomfort in her left thigh at night when she is trying to go to bed.  Denies pain, tenderness or redness to the area.  Pt reports reports some nausea that was food smells.  No vomiting.  She states she is unable to tolerate Premier protein  shakes  Sheis drinking approximately 48+ oz of water daily  + BM  She is drinking and eating 30-60 grams of protein daily.     She is taking bariatric vitamins without issue.   She is taking a couple of calcium chews.  Total weight loss since surgery 14lbs  Weight loss since last visit 14lbs  /85 (BP Site: Left Upper Arm, Patient Position: Sitting, BP Cuff Size: Large Adult)   Pulse 82   Temp 97.7 °F (36.5 °C) (Oral)   Resp 14   Ht 1.626 m (5' 4\")   Wt 131.5 kg (290 lb)   SpO2 97%   BMI 49.78 kg/m²            Ms. Lin has a reminder for a \"due or due soon\" health maintenance. I have asked that she contact her primary care provider for follow-up on this health maintenance.      Physical Examination: General appearance - alert, well appearing, and in no distress,  Chest - clear to auscultation bilaterally  Heart - normal rate, regular rhythm, normal S1, S2, no murmurs, rubs, clicks or gallops  Abdomen - soft, nontender, nondistended  scars from previous incisions healing without erythema or induration  Left leg- no redness or tenderness noted.   A/P    Doing well     2 weeks s/p ROBOTIC ASSISTED LAPAROSCOPIC HIATAL HERNIA REPAIR WITH REVISION OF SLEEVE GASTRECTOMY TO GASTRIC BYPASS, ESOPHAGOGASTRODUODENOSCOPY     Diet advanced to soft foods.   Finish Lovenox injections  Flexeril 5 to 10 mg 3 times a day as needed for discomfort.   Focus on 50-60 grams of protein daily.  She may supplement with clear proteins

## 2025-03-26 NOTE — PROGRESS NOTES
Identified patient with two patient identifiers (name and ). Reviewed chart in preparation for visit and have obtained necessary documentation.    aMggie Lin is a 44 y.o. female  Chief Complaint   Patient presents with    Post-Op Check     2 weeks s/p ROBOTIC ASSISTED LAPAROSCOPIC HIATAL HERNIA REPAIR WITH REVISION OF SLEEVE GASTRECTOMY TO GASTRIC BYPASS, ESOPHAGOGASTRODUODENOSCOPY (EGD) (E R A S)    Weight Management     /85 (BP Site: Left Upper Arm, Patient Position: Sitting, BP Cuff Size: Large Adult)   Pulse 82   Temp 97.7 °F (36.5 °C) (Oral)   Resp 14   Ht 1.626 m (5' 4\")   Wt 131.5 kg (290 lb)   SpO2 97%   BMI 49.78 kg/m²     1. Have you been to the ER, urgent care clinic since your last visit?  Hospitalized since your last visit?no    2. Have you seen or consulted any other health care providers outside of the [facility] since your last visit?  Include any pap smears or colon screening. no

## 2025-03-26 NOTE — PATIENT INSTRUCTIONS
What you need to know:  1.   Advance your diet to soft foods.  Follow the handout that you were given today in the office.   2.  Take the recommended vitamins daily  3.  No lifting greater than 20 lbs.   4.  You can do light jogging and walking.   5  Follow up in 2 weeks.  6.  You may go into a pool.   7.  If you are not able to tolerate liquids or soft foods.   Please call our office. [phone]  8.  If you have vomiting and persistent epigastric pain or chest pain. You should call our office, the doctor on-call or go to the emergency room.     Constipation  Benefiber, Miralax & similar (once or twice daily)  Milk of Magnesia (daily as needed)  Dulcolax suppository  Fleets Enema    Soft and Mushy   What is this diet?  Introduces soft, easy to digest foods  Low fat, no sugar added    When do I begin?  Once instructed by your surgeon or NP. Usually 2 -3 weeks after surgery      You will stay on this diet until instructed to start the next phase.     What foods can I eat?  Moist, mushy foods (see approved list of foods)       Key Points  Continue to drink 48-64 ounces of low calorie, non-carbonated, sugar free beverages between meals.   Eat 3 meals per day  Measure each meal to ?cup per meal  Aim for 60 grams of protein every day.  Try food sources of protein first.   Continue to supplement with protein shakes/powder to meet protein goals.  Take small bites.  Try eating with smaller utensils (baby spoon, cocktail fork).  Chew food thoroughly  Allow about 30 minutes to eat a meal.  Eating too fast may cause nausea or vomiting.  Stop eating as soon as you feel full. Overeating may stretch your stomach's capacity and prevent desired weight loss.  Do not drink liquids during meals and 30 minutes after meals.  Drinking with meals may cause nausea or vomiting.  Add one new food at a time  Take vitamins daily                     Shopping Lists    Soft and Mushy  In addition to everything on the Bariatric Liquid diet, you may

## 2025-04-04 ENCOUNTER — TELEPHONE (OUTPATIENT)
Age: 45
End: 2025-04-04

## 2025-04-04 NOTE — TELEPHONE ENCOUNTER
2 patient identifiers used. I made patient aware of provider message. She verbalized understanding and stated she was not prescribed the Levsin. I made her aware I will make provider aware and to call our office if she has any questions or if symptoms worsen. Patient verbalized understanding and thanked for call.

## 2025-04-04 NOTE — TELEPHONE ENCOUNTER
I attempted to call patient to make aware of NP message. No answer message left to return call.       Per NP     she has an appt next week with [facility]ra. encourage the log, walking after meals, and portions no more than 1/2 cup with mushy foods (stage I). avoid drinking with eating. does she have the Levsin? if so, can take q 4 hours for the \"pressure\" feeling associated with drinking.

## 2025-04-04 NOTE — TELEPHONE ENCOUNTER
Bariatric Post Op Call: Week 3     Hydration: Less than 32 ounces of water daily is fair to poor (Goal is 64 ounces per day)  Poor [] Fair [x]  Good [] Great []    Amount: 47 oz of water     Comment:per pt sometimes when she drinks it hurts but has got better, scared she may over do it but knows she can do it.     Ambulation: walking at least 3x/ week for 30 minutes   Poor [] Fair []  Good [] Great [x]    Comment:    Urine Color: Question of any odor and color (should be gopi, pale, and clear)   Dark [] Gopi [] Pale [] Clear [x]    Comment: per pt no odor    Diet: Question any nausea and/or vomiting.   Protein intake (ultimately goal is 60 grams of protein daily and at this stage they should be meeting goal).  Poor [] Fair []  Good [x] Great []    Comment: per pt 50 grams, per pt she is trying, mentally thinks she is over doing it. Denies nausea and vomiting.       Bowel movements: Question of any constipation- haven't had any bowel movements for more than 3 days. This could be related to protein, fluid intake, medications and activity.     Comment:     Incision: (No redness, pain, swelling or fever)     Healing Well [x] Redness [] Pain [x] Drainage [] Swelling []    Comment: pain depending on postioning     Pain: Right sided incisional (usually the largest incision with deep stitch) abdominal pain is normal (should be less than 3). This should be better by 3 weeks post op.   Pain 0 - 10:     Per pt pain is 6 1/2 when drinking something in the mornings or try to bend down     Comment (are they taking pain medication and is it helping? Abdominal support / splinting/ ice or heat?):     per pt yes sometimes gabapentin.        Referred to provider:   Next Appointment:  4-9-25   Support Group: 2nd Thursday every month from 6-7 pm on Zoom     Red Flags = prompt referral to a bariatric team provider for follow up    Fever > 101  Vomiting and not tolerating liquids   Weak and dizzy / lightheaded   Dark urine   Abdominal

## 2025-04-09 ENCOUNTER — TELEMEDICINE (OUTPATIENT)
Age: 45
End: 2025-04-09

## 2025-04-09 VITALS — WEIGHT: 290 LBS | BODY MASS INDEX: 49.51 KG/M2 | HEIGHT: 64 IN

## 2025-04-09 DIAGNOSIS — Z09 SURGICAL FOLLOW-UP CARE: ICD-10-CM

## 2025-04-09 DIAGNOSIS — E66.01 MORBID OBESITY: Primary | ICD-10-CM

## 2025-04-09 PROCEDURE — 99024 POSTOP FOLLOW-UP VISIT: CPT | Performed by: NURSE PRACTITIONER

## 2025-04-09 ASSESSMENT — ANXIETY QUESTIONNAIRES
IF YOU CHECKED OFF ANY PROBLEMS ON THIS QUESTIONNAIRE, HOW DIFFICULT HAVE THESE PROBLEMS MADE IT FOR YOU TO DO YOUR WORK, TAKE CARE OF THINGS AT HOME, OR GET ALONG WITH OTHER PEOPLE: NOT DIFFICULT AT ALL
1. FEELING NERVOUS, ANXIOUS, OR ON EDGE: NOT AT ALL
GAD7 TOTAL SCORE: 0
2. NOT BEING ABLE TO STOP OR CONTROL WORRYING: NOT AT ALL
3. WORRYING TOO MUCH ABOUT DIFFERENT THINGS: NOT AT ALL
4. TROUBLE RELAXING: NOT AT ALL
6. BECOMING EASILY ANNOYED OR IRRITABLE: NOT AT ALL
5. BEING SO RESTLESS THAT IT IS HARD TO SIT STILL: NOT AT ALL
7. FEELING AFRAID AS IF SOMETHING AWFUL MIGHT HAPPEN: NOT AT ALL

## 2025-04-09 ASSESSMENT — PATIENT HEALTH QUESTIONNAIRE - PHQ9
SUM OF ALL RESPONSES TO PHQ QUESTIONS 1-9: 0
SUM OF ALL RESPONSES TO PHQ QUESTIONS 1-9: 0
2. FEELING DOWN, DEPRESSED OR HOPELESS: NOT AT ALL
1. LITTLE INTEREST OR PLEASURE IN DOING THINGS: NOT AT ALL
SUM OF ALL RESPONSES TO PHQ QUESTIONS 1-9: 0
SUM OF ALL RESPONSES TO PHQ QUESTIONS 1-9: 0

## 2025-04-09 NOTE — PROGRESS NOTES
Identified pt with two pt identifiers (name and ). Reviewed chart in preparation for visit and have obtained necessary documentation.    Maggie Lin is a 44 y.o. female Post-Op Check (3/12/2025 surgical date/1. Robotic-assisted laparoscopic hiatal hernia repair./   2. Robotic-assisted laparoscopic revision of sleeve gastrectomy to gastric bypass./   3. Intraoperative upper endoscopy./)  .    Vitals:    25 0800   Weight: 131.5 kg (290 lb)   Height: 1.626 m (5' 4\")          1. Have you been to the ER, urgent care clinic since your last visit?  Hospitalized since your last visit?  no     2. Have you seen or consulted any other health care providers outside of the [facility] since your last visit?  Include any pap smears or colon screening.  No    Patient stated that when she first wakes up and take a sip of water it is uncomfortable.  
department in the past 7 days or scheduled within the next 24 hours.   The patient was located at Home: [address] [id number].  The provider was located at [facility]t [facility] [address] [facility]  [address]  [place] [address].  Confirm you are appropriately licensed, registered, or certified to deliver care in the state where the patient is located as indicated above. If you are not or unsure, please re-schedule the visit: Yes, I confirm.     Note: not billable if this call serves to triage the patient into an appointment for the relevant concern    Maggie Lin is a 44 y.o. female evaluated via telephone on 4/9/2025 for Post-Op Check (3/12/2025 surgical date/1. Robotic-assisted laparoscopic hiatal hernia repair./   2. Robotic-assisted laparoscopic revision of sleeve gastrectomy to gastric bypass./   3. Intraoperative upper endoscopy./)  .        JACKIE Robbins NP         --JACKIE Robbins NP on 4/9/2025 at 8:37 AM    An electronic signature was used to authenticate this note.   JACKIE Robbins NP

## 2025-04-23 ENCOUNTER — TELEMEDICINE (OUTPATIENT)
Age: 45
End: 2025-04-23

## 2025-04-23 DIAGNOSIS — Z09 SURGICAL FOLLOW-UP CARE: ICD-10-CM

## 2025-04-23 DIAGNOSIS — E66.01 MORBID OBESITY (HCC): Primary | ICD-10-CM

## 2025-04-23 PROCEDURE — 99024 POSTOP FOLLOW-UP VISIT: CPT | Performed by: NURSE PRACTITIONER

## 2025-04-23 ASSESSMENT — PATIENT HEALTH QUESTIONNAIRE - PHQ9
2. FEELING DOWN, DEPRESSED OR HOPELESS: NOT AT ALL
SUM OF ALL RESPONSES TO PHQ QUESTIONS 1-9: 0
1. LITTLE INTEREST OR PLEASURE IN DOING THINGS: NOT AT ALL

## 2025-04-23 ASSESSMENT — PAIN SCALES - GENERAL: PAINLEVEL_OUTOF10: 0

## 2025-04-23 NOTE — PROGRESS NOTES
6 weeks status post revision of sleeve gastrectomy to gastric bypass  Pt reports doing well on liquids, soft foods and soft meats. .    No complaints of pain  She has some nausea with some smells like soup. No vomiting.   Sheis drinking approximately 64 oz of water daily  + BM  She is drinking and eating 60+ grams of protein daily.     She is taking bariatric vitamins without issue.     She had lost 14 lbs at her last visit.   There were no vitals taken for this visit.   She is going to weigh herself and send a [employer] message.         Ms. Lin has a reminder for a \"due or due soon\" health maintenance. I have asked that she contact her primary care provider for follow-up on this health maintenance.      Physical Examination:     Abdomen - incisions healing well per patient.     A/P    Doing well 6 weeks status post revision of sleeve gastrectomy to gastric bypass  Diet advanced to solid foods.   Stopped PPI  She is concerned about her Vit D being low. Will start Vit D2 2000 iu OTC and recheck labs in 10 weeks. This is in addition to her bariatric pal vitamins.   Advised patient regard to diet that is high-protein, low-fat, low-sugar, limited carbohydrates. Strive for 50-60 grams of protein daily. If having a snack, foods that are protein or fiber rich.. No eating/drinking together, chew foods well, and portion control. Measure meals. Discussed snacking behavior and to stiill pay attention to behavioral factor and habits. Drink at least 40-64 ounces of water or non-calorie/non-carbonated beverages daily. Continue vitamin regiment daily. Exercise at least 3 days a week with cardiovascular and strength training. Patient to follow up in 10 weeks.. Advised to call office if any questions/concerns.     Maggie Lin, was evaluated through a synchronous (real-time) audio-video encounter. The patient (or guardian if applicable) is aware that this is a billable service, which includes applicable co-pays. This Virtual

## 2025-04-23 NOTE — PROGRESS NOTES
Identified pt with two pt identifiers (name and ). Reviewed chart in preparation for visit and have obtained necessary documentation.    Maggie Lin is a 44 y.o. female  Chief Complaint   Patient presents with    Weight Management     There were no vitals taken for this visit.    1. Have you been to the ER, urgent care clinic since your last visit?  Hospitalized since your last visit?no    2. Have you seen or consulted any other health care providers outside of the [facility] since your last visit?  Include any pap smears or colon screening. No    Pt states she doesn't know her weight nor does she have a scale.

## 2025-06-06 RX ORDER — CYCLOBENZAPRINE HCL 10 MG
TABLET ORAL
Qty: 10 TABLET | Refills: 0 | Status: CANCELLED | OUTPATIENT
Start: 2025-06-06

## 2025-07-02 ENCOUNTER — HOSPITAL ENCOUNTER (EMERGENCY)
Facility: HOSPITAL | Age: 45
Discharge: HOME | End: 2025-07-05
Payer: COMMERCIAL

## 2025-07-02 ENCOUNTER — HOSPITAL ENCOUNTER (EMERGENCY)
Facility: HOSPITAL | Age: 45
Discharge: HOME | End: 2025-07-02
Attending: STUDENT IN AN ORGANIZED HEALTH CARE EDUCATION/TRAINING PROGRAM
Payer: COMMERCIAL

## 2025-07-02 VITALS
WEIGHT: 280.2 LBS | TEMPERATURE: 97.8 F | HEART RATE: 58 BPM | SYSTOLIC BLOOD PRESSURE: 127 MMHG | DIASTOLIC BLOOD PRESSURE: 76 MMHG | BODY MASS INDEX: 48.1 KG/M2 | OXYGEN SATURATION: 95 % | RESPIRATION RATE: 16 BRPM

## 2025-07-02 DIAGNOSIS — M25.561 ACUTE PAIN OF RIGHT KNEE: ICD-10-CM

## 2025-07-02 DIAGNOSIS — M25.532 LEFT WRIST PAIN: Primary | ICD-10-CM

## 2025-07-02 PROCEDURE — 73110 X-RAY EXAM OF WRIST: CPT

## 2025-07-02 PROCEDURE — 73562 X-RAY EXAM OF KNEE 3: CPT

## 2025-07-02 PROCEDURE — 99283 EMERGENCY DEPT VISIT LOW MDM: CPT

## 2025-07-02 PROCEDURE — 6370000000 HC RX 637 (ALT 250 FOR IP)

## 2025-07-02 RX ORDER — ACETAMINOPHEN 500 MG
1000 TABLET ORAL
Status: COMPLETED | OUTPATIENT
Start: 2025-07-02 | End: 2025-07-02

## 2025-07-02 RX ADMIN — ACETAMINOPHEN 1000 MG: 500 TABLET ORAL at 09:59

## 2025-07-02 ASSESSMENT — PAIN DESCRIPTION - FREQUENCY
FREQUENCY: CONTINUOUS
FREQUENCY: CONTINUOUS

## 2025-07-02 ASSESSMENT — PAIN DESCRIPTION - DESCRIPTORS
DESCRIPTORS: ACHING

## 2025-07-02 ASSESSMENT — PAIN SCALES - GENERAL
PAINLEVEL_OUTOF10: 7

## 2025-07-02 ASSESSMENT — PAIN DESCRIPTION - ONSET
ONSET: SUDDEN
ONSET: SUDDEN

## 2025-07-02 ASSESSMENT — PAIN DESCRIPTION - PAIN TYPE
TYPE: ACUTE PAIN
TYPE: ACUTE PAIN

## 2025-07-02 ASSESSMENT — PAIN DESCRIPTION - LOCATION
LOCATION: WRIST

## 2025-07-02 ASSESSMENT — PAIN - FUNCTIONAL ASSESSMENT
PAIN_FUNCTIONAL_ASSESSMENT: ACTIVITIES ARE NOT PREVENTED
PAIN_FUNCTIONAL_ASSESSMENT: ACTIVITIES ARE NOT PREVENTED

## 2025-07-02 ASSESSMENT — PAIN DESCRIPTION - ORIENTATION
ORIENTATION: LEFT

## 2025-07-02 NOTE — ED PROVIDER NOTES
Co-signs this chart in the absence of a cardiologist.        RADIOLOGY:   Non-plain film images such as CT, Ultrasound and MRI are read by the radiologist. Plain radiographic images are visualized and preliminarily interpreted by the emergency physician with the below findings:        Interpretation per the Radiologist below, if available at the time of this note:    XR KNEE RIGHT (3 VIEWS)   Final Result   No acute abnormality.      Electronically signed by Clovis Hines      XR WRIST LEFT (MIN 3 VIEWS)   Final Result   No acute abnormality.      Electronically signed by Clovis Hines           LABS:  Labs Reviewed - No data to display    All other labs were within normal range or not returned as of this dictation.    EMERGENCY DEPARTMENT COURSE and DIFFERENTIAL DIAGNOSIS/MDM:   Vitals:    Vitals:    07/02/25 0931 07/02/25 1034   BP: (!) 167/113 127/76   Pulse: 86 58   Resp: 20 16   Temp: 97.5 °F (36.4 °C) 97.8 °F (36.6 °C)   TempSrc: Oral Oral   SpO2: 95%    Weight: 127.1 kg (280 lb 3.3 oz)            Medical Decision Making  44-year-old patient presents with complaint of mechanical ground-level fall causing right knee and left wrist pain.  Patient is well-appearing and nontoxic.  Hypertensive on arrival, otherwise with normal vital signs.  Hypertension resolved prior to discharge.  On exam, patient with tenderness to palpation over the right patella.  No swelling or deformity.  Ambulating without difficulty.  There is tenderness to palpation over the distal left radius without snuffbox tenderness.  There is tenderness extending into the palmar aspect of the left hand.  No swelling or deformity.  Limited range of motion secondary to pain.  Neurovascularly intact.  Differential diagnosis includes, but is not limited to, fracture, dislocation, contusion, sprain.  X-ray of the left wrist and right knee were obtained which were negative for acute process.  Suspect contusion as likely etiology of pain.  Patient was

## 2025-07-02 NOTE — ED TRIAGE NOTES
Triage: Pt arrives ambulatory from home complaining of a fall on the concrete this morning. Pt reports she landed on her right knee and caught herself with the left wrist. She reports pain is greatest in the left wrist.

## 2025-07-02 NOTE — ED NOTES
Pt given discharge instructions and follow up information. Pt ambulated out of ER with steady gait. Pt states she is calling [employer] home.

## 2025-07-07 ENCOUNTER — OFFICE VISIT (OUTPATIENT)
Age: 45
End: 2025-07-07
Payer: MEDICAID

## 2025-07-07 VITALS
DIASTOLIC BLOOD PRESSURE: 71 MMHG | BODY MASS INDEX: 47.66 KG/M2 | HEART RATE: 65 BPM | WEIGHT: 279.2 LBS | RESPIRATION RATE: 15 BRPM | OXYGEN SATURATION: 100 % | HEIGHT: 64 IN | SYSTOLIC BLOOD PRESSURE: 115 MMHG

## 2025-07-07 DIAGNOSIS — R73.01 IFG (IMPAIRED FASTING GLUCOSE): ICD-10-CM

## 2025-07-07 DIAGNOSIS — E66.01 MORBID OBESITY (HCC): ICD-10-CM

## 2025-07-07 DIAGNOSIS — E66.813 CLASS 3 SEVERE OBESITY DUE TO EXCESS CALORIES WITH SERIOUS COMORBIDITY IN ADULT (HCC): ICD-10-CM

## 2025-07-07 DIAGNOSIS — K21.9 GASTROESOPHAGEAL REFLUX DISEASE WITHOUT ESOPHAGITIS: ICD-10-CM

## 2025-07-07 DIAGNOSIS — D50.8 IRON DEFICIENCY ANEMIA SECONDARY TO INADEQUATE DIETARY IRON INTAKE: ICD-10-CM

## 2025-07-07 DIAGNOSIS — J43.9 PULMONARY EMPHYSEMA, UNSPECIFIED EMPHYSEMA TYPE (HCC): ICD-10-CM

## 2025-07-07 DIAGNOSIS — Z12.39 BREAST SCREENING: ICD-10-CM

## 2025-07-07 DIAGNOSIS — G47.33 OBSTRUCTIVE SLEEP APNEA: Primary | ICD-10-CM

## 2025-07-07 DIAGNOSIS — E78.2 MIXED HYPERLIPIDEMIA: ICD-10-CM

## 2025-07-07 DIAGNOSIS — E78.5 DYSLIPIDEMIA: ICD-10-CM

## 2025-07-07 PROCEDURE — 99214 OFFICE O/P EST MOD 30 MIN: CPT | Performed by: INTERNAL MEDICINE

## 2025-07-07 SDOH — ECONOMIC STABILITY: FOOD INSECURITY: WITHIN THE PAST 12 MONTHS, THE FOOD YOU BOUGHT JUST DIDN'T LAST AND YOU DIDN'T HAVE MONEY TO GET MORE.: NEVER TRUE

## 2025-07-07 SDOH — ECONOMIC STABILITY: FOOD INSECURITY: WITHIN THE PAST 12 MONTHS, YOU WORRIED THAT YOUR FOOD WOULD RUN OUT BEFORE YOU GOT MONEY TO BUY MORE.: NEVER TRUE

## 2025-07-07 ASSESSMENT — ENCOUNTER SYMPTOMS: SHORTNESS OF BREATH: 0

## 2025-09-02 ENCOUNTER — TELEPHONE (OUTPATIENT)
Age: 45
End: 2025-09-02

## 2025-09-04 ENCOUNTER — OFFICE VISIT (OUTPATIENT)
Age: 45
End: 2025-09-04

## 2025-09-04 VITALS
WEIGHT: 277.6 LBS | BODY MASS INDEX: 47.39 KG/M2 | HEART RATE: 85 BPM | HEIGHT: 64 IN | TEMPERATURE: 98.3 F | DIASTOLIC BLOOD PRESSURE: 82 MMHG | OXYGEN SATURATION: 99 % | SYSTOLIC BLOOD PRESSURE: 125 MMHG | RESPIRATION RATE: 14 BRPM

## 2025-09-04 DIAGNOSIS — D64.9 ANEMIA, UNSPECIFIED TYPE: ICD-10-CM

## 2025-09-04 DIAGNOSIS — E55.9 VITAMIN D DEFICIENCY: ICD-10-CM

## 2025-09-04 DIAGNOSIS — E66.01 MORBID OBESITY (HCC): Primary | ICD-10-CM

## 2025-09-04 DIAGNOSIS — E53.8 VITAMIN B12 DEFICIENCY: ICD-10-CM

## 2025-09-04 DIAGNOSIS — Z98.84 S/P GASTRIC BYPASS: ICD-10-CM

## 2025-09-04 DIAGNOSIS — K91.2 POSTSURGICAL NONABSORPTION: ICD-10-CM

## 2025-09-04 PROCEDURE — 99024 POSTOP FOLLOW-UP VISIT: CPT | Performed by: NURSE PRACTITIONER

## 2025-09-04 ASSESSMENT — PATIENT HEALTH QUESTIONNAIRE - PHQ9
SUM OF ALL RESPONSES TO PHQ QUESTIONS 1-9: 0
1. LITTLE INTEREST OR PLEASURE IN DOING THINGS: NOT AT ALL
SUM OF ALL RESPONSES TO PHQ QUESTIONS 1-9: 0
2. FEELING DOWN, DEPRESSED OR HOPELESS: NOT AT ALL

## 2025-09-04 ASSESSMENT — ENCOUNTER SYMPTOMS
SHORTNESS OF BREATH: 0
ABDOMINAL PAIN: 0
NAUSEA: 0
CHEST TIGHTNESS: 0
VOMITING: 0

## (undated) DEVICE — DRESSING FOAM W4XL10IN AG SIL ADH ANTIMIC POSTOP OPTIFOAM

## (undated) DEVICE — COVER LT HNDL PLAS RIG 1 PER PK

## (undated) DEVICE — TIP COVER ACCESSORY

## (undated) DEVICE — AIR SHEET,LAT,COMFORT GLIDE, BLEND 40X80: Brand: MEDLINE

## (undated) DEVICE — BARRIER ADH W3XL4IN UTER PELV ABSRB GYNECARE INTCEED

## (undated) DEVICE — SUTURE PERMAHAND SZ 2-0 L30IN NONABSORBABLE BLK SILK W/O A305H

## (undated) DEVICE — ENDO CARRY-ON PROCEDURE KIT INCLUDES ENZYMATIC SPONGE, GAUZE, BIOHAZARD LABEL, TRAY, LUBRICANT, DIRTY SCOPE LABEL, WATER LABEL, TRAY, DRAWSTRING PAD, AND DEFENDO 4-PIECE KIT.: Brand: ENDO CARRY-ON PROCEDURE KIT

## (undated) DEVICE — SUTURE VCRL 2-0 L27IN ABSRB CT BRAID COAT UD J275H

## (undated) DEVICE — SUTURE ABSORBABLE MONOFILAMENT 3-0 SH 27 IN VIO PDS + PDP316H

## (undated) DEVICE — MARKER SKIN GENTIAN VLT FN TIP W/ 6IN RUL L RESVR MED GRD

## (undated) DEVICE — PAD,SANITARY,11 IN,MAXI,N-STRL,IND WRAP: Brand: MEDLINE

## (undated) DEVICE — SUTURE VICRYL + SZ 0 L27IN ABSRB VLT L26MM UR-6 5/8 CIR VCP603H

## (undated) DEVICE — STAPLER 60 RELOAD WHITE: Brand: SUREFORM

## (undated) DEVICE — DRAIN SURG 19FR 100% SIL END PERF RND NO RESVR TB W/ TRCR

## (undated) DEVICE — BLADE,CARBON-STEEL,10,STRL,DISPOSABLE,TB: Brand: MEDLINE

## (undated) DEVICE — BASIN ST MAJOR-NO CAUTERY: Brand: MEDLINE INDUSTRIES, INC.

## (undated) DEVICE — GENERAL LAPAROSCOPY - SMH: Brand: MEDLINE INDUSTRIES, INC.

## (undated) DEVICE — SUTURE MCRYL SZ 4-0 L27IN ABSRB UD L19MM PS-2 1/2 CIR PRIM Y426H

## (undated) DEVICE — PAD,NON-ADHERENT,3X8,STERILE,LF,1/PK: Brand: MEDLINE

## (undated) DEVICE — SUTURE MCRYL SZ 3-0 L36IN ABSRB UD L36MM CT-1 1/2 CIR Y944H

## (undated) DEVICE — PREMIUM WET SKIN PREP TRAY: Brand: MEDLINE INDUSTRIES, INC.

## (undated) DEVICE — GLOVE SURG SZ 7 L12IN FNGR THK79MIL GRN LTX FREE

## (undated) DEVICE — TOWEL SURG W17XL27IN STD BLU COT NONFENESTRATED PREWASHED

## (undated) DEVICE — X-RAY DETECTABLE SPONGES,16 PLY: Brand: VISTEC

## (undated) DEVICE — SOLUTION ANTIFOG VIS SYS CLEARIFY LAPSCP

## (undated) DEVICE — STAPLER 60 RELOAD BLUE: Brand: SUREFORM

## (undated) DEVICE — ORISE PROKNIFE 3.0 MM ELECTRODE: Brand: ORISE™ PROKNIFE

## (undated) DEVICE — SYRINGE MED 10ML LUERLOCK TIP W/O SFTY DISP

## (undated) DEVICE — VISIGI 3D®  CALIBRATION SYSTEM  SIZE 40FR STD W/ BULB: Brand: BOEHRINGER® VISIGI 3D™ SLEEVE GASTRECTOMY CALIBRATION SYSTEM, SIZE 40FR W/BULB

## (undated) DEVICE — LIQUIBAND RAPID ADHESIVE 36/CS 0.8ML: Brand: MEDLINE

## (undated) DEVICE — GARMENT,MEDLINE,DVT,INT,CALF,MED, GEN2: Brand: MEDLINE

## (undated) DEVICE — EVACUATOR SURG 100CC SIL BLB SUCT RESVR FOR CLS WND DRNGE

## (undated) DEVICE — PAD,NON-ADHERENT,W/AD,3X4,ST,LF,1/PK: Brand: MEDLINE

## (undated) DEVICE — FORCEPS BX L240CM JAW DIA2.8MM L CAP W/ NDL MIC MESH TOOTH

## (undated) DEVICE — PACK,BASIC,SIRUS,V: Brand: MEDLINE

## (undated) DEVICE — TROCAR ENDOSCP L100MM DIA5MM BLDELSS STBL SL THRD OPT VW

## (undated) DEVICE — SUTURE NONABSORBABLE MONOFILAMENT 2-0 V-20 6 IN V-LOC PBT VLOCN0605

## (undated) DEVICE — BASIC SINGLE BASIN 1-LF: Brand: MEDLINE INDUSTRIES, INC.

## (undated) DEVICE — SHEET TRNSF DISPOSABLE HOVERMATT 100 PER CA

## (undated) DEVICE — SUTURE MONOCRYL + SZ 4-0 L27IN ABSRB UD L19MM PS-2 3/8 CIR MCP426H

## (undated) DEVICE — PENCIL SMK EVAC 10 FT BLADE ELECTRD ROCKER FOR TELSCP

## (undated) DEVICE — STAPLER 60: Brand: SUREFORM

## (undated) DEVICE — SYRINGE MED 30ML STD CLR PLAS LUERLOCK TIP N CTRL DISP

## (undated) DEVICE — GOWN,SIRUS,NONRNF,SETINSLV,XL,20/CS: Brand: MEDLINE

## (undated) DEVICE — Device

## (undated) DEVICE — SYRINGE 20ML LL S/C 50

## (undated) DEVICE — ELECTRO LUBE IS A SINGLE PATIENT USE DEVICE THAT IS INTENDED TO BE USED ON ELECTROSURGICAL ELECTRODES TO REDUCE STICKING.: Brand: KEY SURGICAL ELECTRO LUBE

## (undated) DEVICE — STAPLER SKIN SQ 30 ABSRB STPL DISP INSORB ORDER VIA PHONE OR EMAIL

## (undated) DEVICE — DECANTER BAG 9": Brand: MEDLINE INDUSTRIES, INC.

## (undated) DEVICE — SUTURE PDS II SZ 0 L60IN ABSRB VLT L48MM CTX 1/2 CIR Z990G

## (undated) DEVICE — SOLUTION IRRIG 3000ML 0.9% SOD CHL USP UROMATIC PLAS CONT

## (undated) DEVICE — SOLUTION IV 1000 ML 0.9 NACL INJ USP EXCEL PLAS CONTAINER

## (undated) DEVICE — 1LYRTR 16FR10ML 100%SILI SNAP: Brand: MEDLINE INDUSTRIES, INC.

## (undated) DEVICE — SYSTEM EVAC SMOKE LAPARSCOPIC

## (undated) DEVICE — ELECTRODE PT RET AD L9FT HI MOIST COND ADH HYDRGEL CORDED

## (undated) DEVICE — BLADELESS OBTURATOR: Brand: WECK VISTA

## (undated) DEVICE — DRAPE,LITHOTOMY,STERILE: Brand: MEDLINE

## (undated) DEVICE — SPONGE LAPAROTOMY W18XL18IN WHITE STRUNG RADIOPAQUE STERILE

## (undated) DEVICE — APPLICATOR MEDICATED 26 CC SOLUTION HI LT ORNG CHLORAPREP

## (undated) DEVICE — SPONGE GZ W4XL4IN COT RADPQ HIGHLY ABSRB

## (undated) DEVICE — SHEET,DRAPE,UNDERBUTTOCK,GRAD POUCH,PORT: Brand: MEDLINE

## (undated) DEVICE — VESSEL SEALER EXTEND: Brand: ENDOWRIST

## (undated) DEVICE — DRESSING ANTIMIC FOAM OPTIFOAM POSTOP ADH 4 X 6 IN

## (undated) DEVICE — SEAL

## (undated) DEVICE — ARM DRAPE

## (undated) DEVICE — GLOVE SURG SZ 65 L12IN FNGR THK94MIL STD WHT LTX FREE

## (undated) DEVICE — SUTURE MONOCRYL ABSORBABLE L 6 IN SZ 3-0 POLYGLCOLIC ACD MONOFILAMENT SH

## (undated) DEVICE — COVER,TABLE,60X90,STERILE: Brand: MEDLINE

## (undated) DEVICE — ORISE PROKNIFE 1.5 MM ELECTRODE: Brand: ORISE™ PROKNIFE

## (undated) DEVICE — HYPODERMIC SAFETY NEEDLE: Brand: MAGELLAN

## (undated) DEVICE — DEVICE SUT FOR TRCR SITE INCIS ENDO CLOSE

## (undated) DEVICE — SUPPLEMENT DIGESTIVE H2O SOL GI-EASE

## (undated) DEVICE — GOWN,SIRUS,FABRNF,XL,20/CS: Brand: MEDLINE

## (undated) DEVICE — GLOVE ORANGE PI 7   MSG9070

## (undated) DEVICE — SUTURE VLOC 90 2/0 VL 6 GS-22 VLOCM2105

## (undated) DEVICE — NEEDLE SPNL 22GA L3.5IN BLK HUB S STL REG WALL FIT STYL W/